# Patient Record
Sex: FEMALE | Race: WHITE | NOT HISPANIC OR LATINO | Employment: OTHER | URBAN - METROPOLITAN AREA
[De-identification: names, ages, dates, MRNs, and addresses within clinical notes are randomized per-mention and may not be internally consistent; named-entity substitution may affect disease eponyms.]

---

## 2017-03-22 DIAGNOSIS — I10 ESSENTIAL HYPERTENSION: Primary | ICD-10-CM

## 2017-03-22 DIAGNOSIS — R73.9 BLOOD GLUCOSE ELEVATED: ICD-10-CM

## 2017-03-22 DIAGNOSIS — E78.5 HYPERLIPIDEMIA, UNSPECIFIED HYPERLIPIDEMIA TYPE: ICD-10-CM

## 2017-03-25 LAB
ALBUMIN SERPL-MCNC: 4.1 G/DL (ref 3.5–5.2)
ALBUMIN/GLOB SERPL: 1.6 G/DL
ALP SERPL-CCNC: 89 U/L (ref 39–117)
ALT SERPL-CCNC: 10 U/L (ref 1–33)
AST SERPL-CCNC: 13 U/L (ref 1–32)
BILIRUB SERPL-MCNC: 0.4 MG/DL (ref 0.1–1.2)
BUN SERPL-MCNC: 10 MG/DL (ref 8–23)
BUN/CREAT SERPL: 13.3 (ref 7–25)
CALCIUM SERPL-MCNC: 10 MG/DL (ref 8.6–10.5)
CHLORIDE SERPL-SCNC: 99 MMOL/L (ref 98–107)
CHOLEST SERPL-MCNC: 180 MG/DL (ref 0–200)
CO2 SERPL-SCNC: 25.9 MMOL/L (ref 22–29)
CREAT SERPL-MCNC: 0.75 MG/DL (ref 0.57–1)
GLOBULIN SER CALC-MCNC: 2.6 GM/DL
GLUCOSE SERPL-MCNC: 71 MG/DL (ref 65–99)
HBA1C MFR BLD: 5.7 % (ref 4.8–5.6)
HDLC SERPL-MCNC: 47 MG/DL (ref 40–60)
LDLC SERPL CALC-MCNC: 102 MG/DL (ref 0–100)
LDLC/HDLC SERPL: 2.17 {RATIO}
POTASSIUM SERPL-SCNC: 4.3 MMOL/L (ref 3.5–5.2)
PROT SERPL-MCNC: 6.7 G/DL (ref 6–8.5)
SODIUM SERPL-SCNC: 141 MMOL/L (ref 136–145)
TRIGL SERPL-MCNC: 155 MG/DL (ref 0–150)
VLDLC SERPL CALC-MCNC: 31 MG/DL (ref 5–40)

## 2017-04-03 ENCOUNTER — OFFICE VISIT (OUTPATIENT)
Dept: FAMILY MEDICINE CLINIC | Facility: CLINIC | Age: 82
End: 2017-04-03

## 2017-04-03 VITALS
OXYGEN SATURATION: 98 % | HEART RATE: 86 BPM | WEIGHT: 153 LBS | BODY MASS INDEX: 24.01 KG/M2 | SYSTOLIC BLOOD PRESSURE: 110 MMHG | TEMPERATURE: 98.4 F | HEIGHT: 67 IN | DIASTOLIC BLOOD PRESSURE: 78 MMHG

## 2017-04-03 DIAGNOSIS — I10 ESSENTIAL HYPERTENSION: ICD-10-CM

## 2017-04-03 DIAGNOSIS — G30.1 LATE ONSET ALZHEIMER'S DISEASE WITHOUT BEHAVIORAL DISTURBANCE (HCC): Primary | ICD-10-CM

## 2017-04-03 DIAGNOSIS — E78.5 HYPERLIPIDEMIA, UNSPECIFIED HYPERLIPIDEMIA TYPE: ICD-10-CM

## 2017-04-03 DIAGNOSIS — F02.80 LATE ONSET ALZHEIMER'S DISEASE WITHOUT BEHAVIORAL DISTURBANCE (HCC): Primary | ICD-10-CM

## 2017-04-03 PROBLEM — G30.9 ALZHEIMER'S DISEASE (HCC): Status: ACTIVE | Noted: 2017-04-03

## 2017-04-03 PROCEDURE — 99214 OFFICE O/P EST MOD 30 MIN: CPT | Performed by: INTERNAL MEDICINE

## 2017-04-03 RX ORDER — DONEPEZIL HYDROCHLORIDE 5 MG/1
5 TABLET, FILM COATED ORAL NIGHTLY
Qty: 30 TABLET | Refills: 5 | Status: SHIPPED | OUTPATIENT
Start: 2017-04-03 | End: 2017-06-02 | Stop reason: SDUPTHER

## 2017-04-03 NOTE — PROGRESS NOTES
Subjective   Mary Alice Sanders is a 82 y.o. female. Patient is here today for   Chief Complaint   Patient presents with   • Hyperlipidemia     HYPERGLYCEMIA- FOLLOW UP LABS          Vitals:    04/03/17 1253   BP: 110/78   Pulse: 86   Temp: 98.4 °F (36.9 °C)   SpO2: 98%       Past Medical History:   Diagnosis Date   • Allergic rhinitis    • Chest pain    • GERD (gastroesophageal reflux disease)    • Hx of migraine headaches    • Hx: UTI (urinary tract infection)    • Hypertension    • Varicose veins       Allergies   Allergen Reactions   • No Known Drug Allergy       Social History     Social History   • Marital status:      Spouse name: N/A   • Number of children: N/A   • Years of education: N/A     Occupational History   • Not on file.     Social History Main Topics   • Smoking status: Never Smoker   • Smokeless tobacco: Not on file   • Alcohol use No   • Drug use: Defer   • Sexual activity: Defer     Other Topics Concern   • Not on file     Social History Narrative   • No narrative on file        Current Outpatient Prescriptions:   •  amitriptyline (ELAVIL) 25 MG tablet, Take 1 tablet by mouth every night., Disp: 90 tablet, Rfl: 3  •  atorvastatin (LIPITOR) 40 MG tablet, Take 1 tablet by mouth daily., Disp: 90 tablet, Rfl: 3  •  B Complex Vitamins (VITAMIN B COMPLEX) tablet, Take  by mouth daily., Disp: , Rfl:   •  calcium carbonate-vitamin d (CALCIUM 600+D) 600-400 MG-UNIT per tablet, Take  by mouth., Disp: , Rfl:   •  Cholecalciferol (VITAMIN D) 1000 UNITS tablet, Take  by mouth., Disp: , Rfl:   •  DULoxetine (CYMBALTA) 60 MG capsule, Take 1 capsule by mouth daily., Disp: 90 capsule, Rfl: 3  •  esomeprazole (NEXIUM) 40 MG capsule, Take 1 capsule by mouth every morning before breakfast., Disp: 90 capsule, Rfl: 3  •  montelukast (SINGULAIR) 10 MG tablet, Take 1 tablet by mouth every night., Disp: 90 tablet, Rfl: 3  •  propranolol (INDERAL) 20 MG tablet, Take 1 tablet by mouth 2 (two) times a day., Disp: 180  tablet, Rfl: 3  •  donepezil (ARICEPT) 5 MG tablet, Take 1 tablet by mouth Every Night., Disp: 30 tablet, Rfl: 5     Objective     HPI Comments: This pleasant demented lady is here today with her son.  He has no complaints.  He has no complaints except that her memory is getting much worse.  She needs assistance with all activities of daily living.    Hyperlipidemia          Review of Systems   Constitutional: Negative.    HENT: Negative.    Respiratory: Negative.    Cardiovascular: Negative.    Musculoskeletal: Negative.    Neurological: Negative.    Psychiatric/Behavioral: Negative.  Negative for agitation and behavioral problems.       Physical Exam   Constitutional: She is oriented to person, place, and time. She appears well-developed and well-nourished.   Very pleasant demeanor, very demented.   HENT:   Head: Normocephalic and atraumatic.   Eyes: EOM are normal. Pupils are equal, round, and reactive to light.   Pulmonary/Chest: Effort normal.   Neurological: She is alert and oriented to person, place, and time.   Psychiatric: She has a normal mood and affect.   She states that she has a very difficult time putting a intelligible sentence together.  She is smiling.  I believe she is comfortable.         Problem List Items Addressed This Visit        Cardiovascular and Mediastinum    HLD (hyperlipidemia)    BP (high blood pressure)       Nervous and Auditory    Alzheimer's disease - Primary    Relevant Medications    donepezil (ARICEPT) 5 MG tablet            PLAN  Her hypercholesterolemia is well-controlled on atorvastatin 40 mg once daily.    Her hypertension is well-controlled.    Her memory has gotten much worse.  I started her on Aricept 5 mg once daily.  Edema of the month, I would like the patient's caretaker to increase this dose to 2 tablets (10 mg) daily.    I would like to have her back in about 2 months to see how she is getting along.  No Follow-up on file.

## 2017-06-02 ENCOUNTER — OFFICE VISIT (OUTPATIENT)
Dept: FAMILY MEDICINE CLINIC | Facility: CLINIC | Age: 82
End: 2017-06-02

## 2017-06-02 VITALS
RESPIRATION RATE: 18 BRPM | DIASTOLIC BLOOD PRESSURE: 70 MMHG | SYSTOLIC BLOOD PRESSURE: 120 MMHG | BODY MASS INDEX: 24.55 KG/M2 | WEIGHT: 156.4 LBS | HEIGHT: 67 IN | HEART RATE: 70 BPM

## 2017-06-02 DIAGNOSIS — G30.1 LATE ONSET ALZHEIMER'S DISEASE WITHOUT BEHAVIORAL DISTURBANCE (HCC): ICD-10-CM

## 2017-06-02 DIAGNOSIS — F02.80 LATE ONSET ALZHEIMER'S DISEASE WITHOUT BEHAVIORAL DISTURBANCE (HCC): ICD-10-CM

## 2017-06-02 DIAGNOSIS — I10 ESSENTIAL HYPERTENSION: Primary | ICD-10-CM

## 2017-06-02 PROCEDURE — 99213 OFFICE O/P EST LOW 20 MIN: CPT | Performed by: INTERNAL MEDICINE

## 2017-06-02 RX ORDER — DONEPEZIL HYDROCHLORIDE 5 MG/1
5 TABLET, FILM COATED ORAL NIGHTLY
Qty: 90 TABLET | Refills: 3 | Status: SHIPPED | OUTPATIENT
Start: 2017-06-02 | End: 2018-06-07 | Stop reason: SDUPTHER

## 2017-06-02 RX ORDER — MEMANTINE HYDROCHLORIDE 5 MG/1
5 TABLET ORAL 2 TIMES DAILY
Qty: 180 TABLET | Refills: 3 | Status: SHIPPED | OUTPATIENT
Start: 2017-06-02 | End: 2018-06-07 | Stop reason: SDUPTHER

## 2017-06-06 NOTE — PROGRESS NOTES
Subjective   Mary Alice Sanders is a 82 y.o. female. Patient is here today for   Chief Complaint   Patient presents with   • Medication Follow Up          Vitals:    06/02/17 1308   BP: 120/70   Pulse: 70   Resp: 18       Past Medical History:   Diagnosis Date   • Allergic rhinitis    • Chest pain    • GERD (gastroesophageal reflux disease)    • Hx of migraine headaches    • Hx: UTI (urinary tract infection)    • Hypertension    • Varicose veins       Allergies   Allergen Reactions   • No Known Drug Allergy       Social History     Social History   • Marital status:      Spouse name: N/A   • Number of children: N/A   • Years of education: N/A     Occupational History   • Not on file.     Social History Main Topics   • Smoking status: Never Smoker   • Smokeless tobacco: Not on file   • Alcohol use No   • Drug use: Defer   • Sexual activity: Defer     Other Topics Concern   • Not on file     Social History Narrative        Current Outpatient Prescriptions:   •  amitriptyline (ELAVIL) 25 MG tablet, Take 1 tablet by mouth every night., Disp: 90 tablet, Rfl: 3  •  atorvastatin (LIPITOR) 40 MG tablet, Take 1 tablet by mouth daily., Disp: 90 tablet, Rfl: 3  •  B Complex Vitamins (VITAMIN B COMPLEX) tablet, Take  by mouth daily., Disp: , Rfl:   •  calcium carbonate-vitamin d (CALCIUM 600+D) 600-400 MG-UNIT per tablet, Take  by mouth., Disp: , Rfl:   •  Cholecalciferol (VITAMIN D) 1000 UNITS tablet, Take  by mouth., Disp: , Rfl:   •  donepezil (ARICEPT) 5 MG tablet, Take 1 tablet by mouth Every Night., Disp: 90 tablet, Rfl: 3  •  DULoxetine (CYMBALTA) 60 MG capsule, Take 1 capsule by mouth daily., Disp: 90 capsule, Rfl: 3  •  esomeprazole (NEXIUM) 40 MG capsule, Take 1 capsule by mouth every morning before breakfast., Disp: 90 capsule, Rfl: 3  •  montelukast (SINGULAIR) 10 MG tablet, Take 1 tablet by mouth every night., Disp: 90 tablet, Rfl: 3  •  propranolol (INDERAL) 20 MG tablet, Take 1 tablet by mouth 2 (two) times a  day., Disp: 180 tablet, Rfl: 3  •  memantine (NAMENDA) 5 MG tablet, Take 1 tablet by mouth 2 (Two) Times a Day., Disp: 180 tablet, Rfl: 3     Objective     HPI Comments: She really doesn't have any complaints today.  She is here to follow-up with hypertension and a very mild dementia.       Review of Systems   Constitutional: Negative.    HENT: Negative.    Respiratory: Negative.    Cardiovascular: Negative.    Musculoskeletal: Negative.    Psychiatric/Behavioral: Negative.        Physical Exam   Constitutional: She is oriented to person, place, and time. She appears well-developed and well-nourished.   HENT:   Head: Normocephalic and atraumatic.   Pulmonary/Chest: Effort normal.   Neurological: She is alert and oriented to person, place, and time.   Psychiatric: She has a normal mood and affect. Her behavior is normal.   Nursing note and vitals reviewed.        Problem List Items Addressed This Visit        Cardiovascular and Mediastinum    BP (high blood pressure) - Primary       Nervous and Auditory    Alzheimer's disease    Relevant Medications    memantine (NAMENDA) 5 MG tablet    donepezil (ARICEPT) 5 MG tablet            PLAN  Her hypertension appears to be well-controlled.    She is tolerating Aricept.  Add Namenda 5 mg 1 by mouth twice a day.    I like to have her back in a couple months to see how she is getting along.      No Follow-up on file.

## 2017-07-19 ENCOUNTER — APPOINTMENT (OUTPATIENT)
Dept: GENERAL RADIOLOGY | Facility: HOSPITAL | Age: 82
End: 2017-07-19

## 2017-07-19 PROCEDURE — 73630 X-RAY EXAM OF FOOT: CPT | Performed by: FAMILY MEDICINE

## 2017-07-19 PROCEDURE — 73610 X-RAY EXAM OF ANKLE: CPT | Performed by: FAMILY MEDICINE

## 2017-07-20 ENCOUNTER — TELEPHONE (OUTPATIENT)
Dept: FAMILY MEDICINE CLINIC | Facility: CLINIC | Age: 82
End: 2017-07-20

## 2017-07-20 RX ORDER — PROPRANOLOL HYDROCHLORIDE 20 MG/1
20 TABLET ORAL 2 TIMES DAILY
Qty: 30 TABLET | Refills: 1 | Status: SHIPPED | OUTPATIENT
Start: 2017-07-20 | End: 2017-07-20 | Stop reason: SDUPTHER

## 2017-07-20 RX ORDER — PROPRANOLOL HYDROCHLORIDE 20 MG/1
20 TABLET ORAL 2 TIMES DAILY
Qty: 30 TABLET | Refills: 1 | Status: SHIPPED | OUTPATIENT
Start: 2017-07-20 | End: 2017-08-29 | Stop reason: SDUPTHER

## 2017-07-20 NOTE — TELEPHONE ENCOUNTER
Prescription sent to pharmacy   ----- Message from Charlee Diaz sent at 7/20/2017  1:31 PM EDT -----  Contact: pt  PT USUALLY GETS AN RX FOR PROPANOLOL 20 MG XCL BID FROM [facility] BUT HE HAS RECEIVED A NOTICE FROM THEM THEY IT IS NOT AVAILABLE THRU THEM AT THIS TIME AND THAT HE NEEDED TO GET A 30 DAY SUPPLY FROM A LOCAL PHARMACY    SHANTE LIRA

## 2017-08-29 RX ORDER — PROPRANOLOL HYDROCHLORIDE 20 MG/1
TABLET ORAL
Qty: 30 TABLET | Refills: 0 | Status: SHIPPED | OUTPATIENT
Start: 2017-08-29 | End: 2017-09-14 | Stop reason: SDUPTHER

## 2017-09-12 DIAGNOSIS — R73.9 BLOOD GLUCOSE ELEVATED: ICD-10-CM

## 2017-09-12 DIAGNOSIS — I10 BENIGN ESSENTIAL HYPERTENSION: Primary | ICD-10-CM

## 2017-09-12 DIAGNOSIS — E78.5 HYPERLIPIDEMIA, UNSPECIFIED HYPERLIPIDEMIA TYPE: ICD-10-CM

## 2017-09-14 RX ORDER — PROPRANOLOL HYDROCHLORIDE 20 MG/1
TABLET ORAL
Qty: 30 TABLET | Refills: 0 | Status: SHIPPED | OUTPATIENT
Start: 2017-09-14 | End: 2017-09-22 | Stop reason: SDUPTHER

## 2017-09-15 LAB
ALBUMIN SERPL-MCNC: 3.9 G/DL (ref 3.5–5.2)
ALBUMIN/GLOB SERPL: 1.6 G/DL
ALP SERPL-CCNC: 75 U/L (ref 39–117)
ALT SERPL-CCNC: 10 U/L (ref 1–33)
AST SERPL-CCNC: 10 U/L (ref 1–32)
BASOPHILS # BLD AUTO: 0.02 10*3/MM3 (ref 0–0.2)
BASOPHILS NFR BLD AUTO: 0.3 % (ref 0–1.5)
BILIRUB SERPL-MCNC: 0.4 MG/DL (ref 0.1–1.2)
BUN SERPL-MCNC: 12 MG/DL (ref 8–23)
BUN/CREAT SERPL: 16 (ref 7–25)
CALCIUM SERPL-MCNC: 9.7 MG/DL (ref 8.6–10.5)
CHLORIDE SERPL-SCNC: 99 MMOL/L (ref 98–107)
CHOLEST SERPL-MCNC: 201 MG/DL (ref 0–200)
CO2 SERPL-SCNC: 24.7 MMOL/L (ref 22–29)
CREAT SERPL-MCNC: 0.75 MG/DL (ref 0.57–1)
EOSINOPHIL # BLD AUTO: 0.18 10*3/MM3 (ref 0–0.7)
EOSINOPHIL NFR BLD AUTO: 2.8 % (ref 0.3–6.2)
ERYTHROCYTE [DISTWIDTH] IN BLOOD BY AUTOMATED COUNT: 15.8 % (ref 11.7–13)
GLOBULIN SER CALC-MCNC: 2.4 GM/DL
GLUCOSE SERPL-MCNC: 93 MG/DL (ref 65–99)
HBA1C MFR BLD: 5.63 % (ref 4.8–5.6)
HCT VFR BLD AUTO: 41.7 % (ref 35.6–45.5)
HDLC SERPL-MCNC: 51 MG/DL (ref 40–60)
HGB BLD-MCNC: 13 G/DL (ref 11.9–15.5)
IMM GRANULOCYTES # BLD: 0.02 10*3/MM3 (ref 0–0.03)
IMM GRANULOCYTES NFR BLD: 0.3 % (ref 0–0.5)
LDLC SERPL CALC-MCNC: 113 MG/DL (ref 0–100)
LDLC/HDLC SERPL: 2.22 {RATIO}
LYMPHOCYTES # BLD AUTO: 2.82 10*3/MM3 (ref 0.9–4.8)
LYMPHOCYTES NFR BLD AUTO: 43.2 % (ref 19.6–45.3)
MCH RBC QN AUTO: 30.4 PG (ref 26.9–32)
MCHC RBC AUTO-ENTMCNC: 31.2 G/DL (ref 32.4–36.3)
MCV RBC AUTO: 97.7 FL (ref 80.5–98.2)
MONOCYTES # BLD AUTO: 0.44 10*3/MM3 (ref 0.2–1.2)
MONOCYTES NFR BLD AUTO: 6.7 % (ref 5–12)
NEUTROPHILS # BLD AUTO: 3.05 10*3/MM3 (ref 1.9–8.1)
NEUTROPHILS NFR BLD AUTO: 46.7 % (ref 42.7–76)
PLATELET # BLD AUTO: 271 10*3/MM3 (ref 140–500)
POTASSIUM SERPL-SCNC: 4.1 MMOL/L (ref 3.5–5.2)
PROT SERPL-MCNC: 6.3 G/DL (ref 6–8.5)
RBC # BLD AUTO: 4.27 10*6/MM3 (ref 3.9–5.2)
SODIUM SERPL-SCNC: 140 MMOL/L (ref 136–145)
TRIGL SERPL-MCNC: 184 MG/DL (ref 0–150)
VLDLC SERPL CALC-MCNC: 36.8 MG/DL (ref 5–40)
WBC # BLD AUTO: 6.53 10*3/MM3 (ref 4.5–10.7)

## 2017-09-22 ENCOUNTER — OFFICE VISIT (OUTPATIENT)
Dept: FAMILY MEDICINE CLINIC | Facility: CLINIC | Age: 82
End: 2017-09-22

## 2017-09-22 VITALS
HEART RATE: 65 BPM | SYSTOLIC BLOOD PRESSURE: 122 MMHG | DIASTOLIC BLOOD PRESSURE: 72 MMHG | BODY MASS INDEX: 24.17 KG/M2 | TEMPERATURE: 98 F | WEIGHT: 154 LBS | OXYGEN SATURATION: 98 % | HEIGHT: 67 IN | RESPIRATION RATE: 16 BRPM

## 2017-09-22 DIAGNOSIS — I10 ESSENTIAL HYPERTENSION: ICD-10-CM

## 2017-09-22 DIAGNOSIS — E78.5 HYPERLIPIDEMIA, UNSPECIFIED HYPERLIPIDEMIA TYPE: ICD-10-CM

## 2017-09-22 DIAGNOSIS — F02.80 LATE ONSET ALZHEIMER'S DISEASE WITHOUT BEHAVIORAL DISTURBANCE (HCC): Primary | ICD-10-CM

## 2017-09-22 DIAGNOSIS — G30.1 LATE ONSET ALZHEIMER'S DISEASE WITHOUT BEHAVIORAL DISTURBANCE (HCC): Primary | ICD-10-CM

## 2017-09-22 PROCEDURE — 99213 OFFICE O/P EST LOW 20 MIN: CPT | Performed by: INTERNAL MEDICINE

## 2017-09-22 RX ORDER — ATORVASTATIN CALCIUM 40 MG/1
40 TABLET, FILM COATED ORAL DAILY
Qty: 90 TABLET | Refills: 3 | Status: CANCELLED | OUTPATIENT
Start: 2017-09-22

## 2017-09-22 RX ORDER — PROPRANOLOL HYDROCHLORIDE 20 MG/1
20 TABLET ORAL 2 TIMES DAILY
Qty: 180 TABLET | Refills: 3 | Status: SHIPPED | OUTPATIENT
Start: 2017-09-22 | End: 2018-10-15 | Stop reason: SDUPTHER

## 2017-09-22 RX ORDER — MONTELUKAST SODIUM 10 MG/1
10 TABLET ORAL NIGHTLY
Qty: 90 TABLET | Refills: 3 | Status: SHIPPED | OUTPATIENT
Start: 2017-09-22 | End: 2018-09-21

## 2017-09-22 RX ORDER — ESOMEPRAZOLE MAGNESIUM 40 MG/1
40 CAPSULE, DELAYED RELEASE ORAL
Qty: 90 CAPSULE | Refills: 3 | Status: SHIPPED | OUTPATIENT
Start: 2017-09-22 | End: 2018-10-15 | Stop reason: SDUPTHER

## 2017-09-22 RX ORDER — ATORVASTATIN CALCIUM 40 MG/1
40 TABLET, FILM COATED ORAL DAILY
Qty: 90 TABLET | Refills: 3 | Status: SHIPPED | OUTPATIENT
Start: 2017-09-22 | End: 2018-10-15 | Stop reason: SDUPTHER

## 2017-09-22 RX ORDER — DULOXETIN HYDROCHLORIDE 60 MG/1
60 CAPSULE, DELAYED RELEASE ORAL DAILY
Qty: 90 CAPSULE | Refills: 3 | Status: SHIPPED | OUTPATIENT
Start: 2017-09-22 | End: 2017-09-22 | Stop reason: SDUPTHER

## 2017-09-22 RX ORDER — ESOMEPRAZOLE MAGNESIUM 40 MG/1
40 CAPSULE, DELAYED RELEASE ORAL
Qty: 90 CAPSULE | Refills: 3 | Status: SHIPPED | OUTPATIENT
Start: 2017-09-22 | End: 2017-09-22 | Stop reason: SDUPTHER

## 2017-09-22 RX ORDER — ATORVASTATIN CALCIUM 40 MG/1
40 TABLET, FILM COATED ORAL DAILY
Qty: 90 TABLET | Refills: 3 | Status: SHIPPED | OUTPATIENT
Start: 2017-09-22 | End: 2017-09-22 | Stop reason: SDUPTHER

## 2017-09-22 RX ORDER — MONTELUKAST SODIUM 10 MG/1
10 TABLET ORAL NIGHTLY
Qty: 90 TABLET | Refills: 3 | Status: SHIPPED | OUTPATIENT
Start: 2017-09-22 | End: 2017-09-22 | Stop reason: SDUPTHER

## 2017-09-22 RX ORDER — DULOXETIN HYDROCHLORIDE 60 MG/1
60 CAPSULE, DELAYED RELEASE ORAL DAILY
Qty: 90 CAPSULE | Refills: 3 | Status: SHIPPED | OUTPATIENT
Start: 2017-09-22 | End: 2018-10-15 | Stop reason: SDUPTHER

## 2017-09-22 RX ORDER — AMITRIPTYLINE HYDROCHLORIDE 25 MG/1
25 TABLET, FILM COATED ORAL NIGHTLY
Qty: 90 TABLET | Refills: 3 | Status: SHIPPED | OUTPATIENT
Start: 2017-09-22 | End: 2018-10-15 | Stop reason: SDUPTHER

## 2017-09-22 RX ORDER — AMITRIPTYLINE HYDROCHLORIDE 25 MG/1
25 TABLET, FILM COATED ORAL NIGHTLY
Qty: 90 TABLET | Refills: 3 | Status: SHIPPED | OUTPATIENT
Start: 2017-09-22 | End: 2017-09-22 | Stop reason: SDUPTHER

## 2017-09-24 NOTE — PROGRESS NOTES
Subjective   Mary Alice Sanders is a 82 y.o. female. Patient is here today for   Chief Complaint   Patient presents with   • Hypertension     lab f/u    • Hyperlipidemia   • Alzheimer's Disease          Vitals:    09/22/17 1329   BP: 122/72   Pulse: 65   Resp: 16   Temp: 98 °F (36.7 °C)   SpO2: 98%       Past Medical History:   Diagnosis Date   • Allergic rhinitis    • Chest pain    • GERD (gastroesophageal reflux disease)    • Hx of migraine headaches    • Hx: UTI (urinary tract infection)    • Hypertension    • Varicose veins       Allergies   Allergen Reactions   • No Known Drug Allergy       Social History     Social History   • Marital status:      Spouse name: N/A   • Number of children: N/A   • Years of education: N/A     Occupational History   • Not on file.     Social History Main Topics   • Smoking status: Never Smoker   • Smokeless tobacco: Not on file   • Alcohol use No   • Drug use: Defer   • Sexual activity: Defer     Other Topics Concern   • Not on file     Social History Narrative        Current Outpatient Prescriptions:   •  acetaminophen-codeine (TYLENOL #3) 300-30 MG per tablet, Take 1 tablet by mouth Every 6 (Six) Hours As Needed for Moderate Pain ., Disp: 12 tablet, Rfl: 0  •  B Complex Vitamins (VITAMIN B COMPLEX) tablet, Take  by mouth daily., Disp: , Rfl:   •  calcium carbonate-vitamin d (CALCIUM 600+D) 600-400 MG-UNIT per tablet, Take  by mouth., Disp: , Rfl:   •  Cholecalciferol (VITAMIN D) 1000 UNITS tablet, Take  by mouth., Disp: , Rfl:   •  donepezil (ARICEPT) 5 MG tablet, Take 1 tablet by mouth Every Night., Disp: 90 tablet, Rfl: 3  •  memantine (NAMENDA) 5 MG tablet, Take 1 tablet by mouth 2 (Two) Times a Day., Disp: 180 tablet, Rfl: 3  •  propranolol (INDERAL) 20 MG tablet, Take 1 tablet by mouth 2 (Two) Times a Day., Disp: 180 tablet, Rfl: 3  •  amitriptyline (ELAVIL) 25 MG tablet, Take 1 tablet by mouth Every Night., Disp: 90 tablet, Rfl: 3  •  atorvastatin (LIPITOR) 40 MG tablet,  Take 1 tablet by mouth Daily., Disp: 90 tablet, Rfl: 3  •  DULoxetine (CYMBALTA) 60 MG capsule, Take 1 capsule by mouth Daily., Disp: 90 capsule, Rfl: 3  •  esomeprazole (NEXIUM) 40 MG capsule, Take 1 capsule by mouth Every Morning Before Breakfast., Disp: 90 capsule, Rfl: 3  •  montelukast (SINGULAIR) 10 MG tablet, Take 1 tablet by mouth Every Night. Swallow the tablet do not break, crush, or chew, Disp: 90 tablet, Rfl: 3     Objective     HPI Comments: She is here to follow-up her hypertension and hypercholesterolemia as well as her Alzheimer's dementia.    She is accompanied by daughter.    Hypertension     Hyperlipidemia     Alzheimer's Disease          Review of Systems   Constitutional: Negative.    HENT: Negative.    Respiratory: Negative.    Cardiovascular: Negative.    Psychiatric/Behavioral:        Her dementia is profound.  She needs assistance with most activities of daily living including toileting and dressing.       Physical Exam   Constitutional: She is oriented to person, place, and time. She appears well-developed and well-nourished.   HENT:   Head: Normocephalic and atraumatic.   Pulmonary/Chest: Effort normal.   Neurological: She is alert and oriented to person, place, and time.   Psychiatric: She has a normal mood and affect. Her behavior is normal.   Nursing note and vitals reviewed.        Problem List Items Addressed This Visit        Cardiovascular and Mediastinum    HLD (hyperlipidemia)    BP (high blood pressure)    Relevant Medications    propranolol (INDERAL) 20 MG tablet       Nervous and Auditory    Alzheimer's disease - Primary            PLAN  Her hypertension is well-controlled.    Her hypercholesterolemia is well-controlled.    Her Alzheimer's dementia is severe and stable.    I like to see her back in about 6 months unless her daughter feels she needs to be seen sooner.  Prior to that visit, she should have the following labs drawn: Lipid profile, comprehensive metabolic panel,  CBC, and urinalysis.  No Follow-up on file.

## 2017-12-05 DIAGNOSIS — I10 ESSENTIAL HYPERTENSION: Primary | ICD-10-CM

## 2017-12-05 DIAGNOSIS — E78.5 HYPERLIPIDEMIA, UNSPECIFIED HYPERLIPIDEMIA TYPE: ICD-10-CM

## 2018-01-06 LAB
ALBUMIN SERPL-MCNC: 4.3 G/DL (ref 3.5–5.2)
ALBUMIN/GLOB SERPL: 1.7 G/DL
ALP SERPL-CCNC: 95 U/L (ref 39–117)
ALT SERPL-CCNC: 8 U/L (ref 1–33)
APPEARANCE UR: (no result)
AST SERPL-CCNC: 13 U/L (ref 1–32)
BACTERIA #/AREA URNS HPF: ABNORMAL /HPF
BASOPHILS # BLD AUTO: 0.03 10*3/MM3 (ref 0–0.2)
BASOPHILS NFR BLD AUTO: 0.5 % (ref 0–1.5)
BILIRUB SERPL-MCNC: 0.4 MG/DL (ref 0.1–1.2)
BILIRUB UR QL STRIP: NEGATIVE
BUN SERPL-MCNC: 14 MG/DL (ref 8–23)
BUN/CREAT SERPL: 16.5 (ref 7–25)
CALCIUM SERPL-MCNC: 9.2 MG/DL (ref 8.6–10.5)
CASTS URNS MICRO: ABNORMAL
CHLORIDE SERPL-SCNC: 101 MMOL/L (ref 98–107)
CHOLEST SERPL-MCNC: 214 MG/DL (ref 0–200)
CO2 SERPL-SCNC: 28.2 MMOL/L (ref 22–29)
COLOR UR: YELLOW
CREAT SERPL-MCNC: 0.85 MG/DL (ref 0.57–1)
EOSINOPHIL # BLD AUTO: 0.21 10*3/MM3 (ref 0–0.7)
EOSINOPHIL NFR BLD AUTO: 3.5 % (ref 0.3–6.2)
EPI CELLS #/AREA URNS HPF: ABNORMAL /HPF
ERYTHROCYTE [DISTWIDTH] IN BLOOD BY AUTOMATED COUNT: 14.4 % (ref 11.7–13)
GLOBULIN SER CALC-MCNC: 2.6 GM/DL
GLUCOSE SERPL-MCNC: 105 MG/DL (ref 65–99)
GLUCOSE UR QL: NEGATIVE
HCT VFR BLD AUTO: 43.9 % (ref 35.6–45.5)
HDLC SERPL-MCNC: 62 MG/DL (ref 40–60)
HGB BLD-MCNC: 13.7 G/DL (ref 11.9–15.5)
HGB UR QL STRIP: NEGATIVE
IMM GRANULOCYTES # BLD: 0 10*3/MM3 (ref 0–0.03)
IMM GRANULOCYTES NFR BLD: 0 % (ref 0–0.5)
KETONES UR QL STRIP: NEGATIVE
LDLC SERPL CALC-MCNC: 127 MG/DL (ref 0–100)
LDLC/HDLC SERPL: 2.05 {RATIO}
LEUKOCYTE ESTERASE UR QL STRIP: (no result)
LYMPHOCYTES # BLD AUTO: 2.4 10*3/MM3 (ref 0.9–4.8)
LYMPHOCYTES NFR BLD AUTO: 39.5 % (ref 19.6–45.3)
MCH RBC QN AUTO: 30.9 PG (ref 26.9–32)
MCHC RBC AUTO-ENTMCNC: 31.2 G/DL (ref 32.4–36.3)
MCV RBC AUTO: 99.1 FL (ref 80.5–98.2)
MONOCYTES # BLD AUTO: 0.41 10*3/MM3 (ref 0.2–1.2)
MONOCYTES NFR BLD AUTO: 6.8 % (ref 5–12)
MUCOUS THREADS URNS QL MICRO: ABNORMAL /HPF
NEUTROPHILS # BLD AUTO: 3.02 10*3/MM3 (ref 1.9–8.1)
NEUTROPHILS NFR BLD AUTO: 49.7 % (ref 42.7–76)
NITRITE UR QL STRIP: POSITIVE
PH UR STRIP: 6.5 [PH] (ref 5–8)
PLATELET # BLD AUTO: 279 10*3/MM3 (ref 140–500)
POTASSIUM SERPL-SCNC: 4.5 MMOL/L (ref 3.5–5.2)
PROT SERPL-MCNC: 6.9 G/DL (ref 6–8.5)
PROT UR QL STRIP: NEGATIVE
RBC # BLD AUTO: 4.43 10*6/MM3 (ref 3.9–5.2)
RBC #/AREA URNS HPF: ABNORMAL /HPF
SODIUM SERPL-SCNC: 140 MMOL/L (ref 136–145)
SP GR UR: 1.01 (ref 1–1.03)
TRIGL SERPL-MCNC: 125 MG/DL (ref 0–150)
UROBILINOGEN UR STRIP-MCNC: (no result) MG/DL
VLDLC SERPL CALC-MCNC: 25 MG/DL (ref 5–40)
WBC # BLD AUTO: 6.07 10*3/MM3 (ref 4.5–10.7)
WBC #/AREA URNS HPF: ABNORMAL /HPF

## 2018-01-15 ENCOUNTER — OFFICE VISIT (OUTPATIENT)
Dept: FAMILY MEDICINE CLINIC | Facility: CLINIC | Age: 83
End: 2018-01-15

## 2018-01-15 VITALS
HEART RATE: 84 BPM | RESPIRATION RATE: 16 BRPM | BODY MASS INDEX: 25.11 KG/M2 | DIASTOLIC BLOOD PRESSURE: 82 MMHG | SYSTOLIC BLOOD PRESSURE: 120 MMHG | OXYGEN SATURATION: 97 % | WEIGHT: 160 LBS | HEIGHT: 67 IN

## 2018-01-15 DIAGNOSIS — N30.90 CYSTITIS: ICD-10-CM

## 2018-01-15 DIAGNOSIS — E78.5 HYPERLIPIDEMIA, UNSPECIFIED HYPERLIPIDEMIA TYPE: Primary | ICD-10-CM

## 2018-01-15 DIAGNOSIS — E55.9 VITAMIN D DEFICIENCY: ICD-10-CM

## 2018-01-15 DIAGNOSIS — I10 ESSENTIAL HYPERTENSION: ICD-10-CM

## 2018-01-15 DIAGNOSIS — E53.8 VITAMIN B 12 DEFICIENCY: ICD-10-CM

## 2018-01-15 PROCEDURE — 99213 OFFICE O/P EST LOW 20 MIN: CPT | Performed by: INTERNAL MEDICINE

## 2018-01-15 RX ORDER — NITROFURANTOIN 25; 75 MG/1; MG/1
100 CAPSULE ORAL EVERY 12 HOURS SCHEDULED
Qty: 14 CAPSULE | Refills: 1 | Status: SHIPPED | OUTPATIENT
Start: 2018-01-15 | End: 2018-02-26

## 2018-01-15 NOTE — PROGRESS NOTES
Subjective   Mary Alice Sanders is a 82 y.o. female. Patient is here today for   Chief Complaint   Patient presents with   • Alzheimer's Disease   • Hyperlipidemia          Vitals:    01/15/18 1407   BP: 120/82   Pulse: 84   Resp: 16   SpO2: 97%       Past Medical History:   Diagnosis Date   • Allergic rhinitis    • Chest pain    • GERD (gastroesophageal reflux disease)    • Hx of migraine headaches    • Hx: UTI (urinary tract infection)    • Hypertension    • Varicose veins       Allergies   Allergen Reactions   • No Known Drug Allergy       Social History     Social History   • Marital status:      Spouse name: N/A   • Number of children: N/A   • Years of education: N/A     Occupational History   • Not on file.     Social History Main Topics   • Smoking status: Never Smoker   • Smokeless tobacco: Not on file   • Alcohol use No   • Drug use: Defer   • Sexual activity: Defer     Other Topics Concern   • Not on file     Social History Narrative        Current Outpatient Prescriptions:   •  acetaminophen-codeine (TYLENOL #3) 300-30 MG per tablet, Take 1 tablet by mouth Every 6 (Six) Hours As Needed for Moderate Pain ., Disp: 12 tablet, Rfl: 0  •  amitriptyline (ELAVIL) 25 MG tablet, Take 1 tablet by mouth Every Night., Disp: 90 tablet, Rfl: 3  •  atorvastatin (LIPITOR) 40 MG tablet, Take 1 tablet by mouth Daily., Disp: 90 tablet, Rfl: 3  •  B Complex Vitamins (VITAMIN B COMPLEX) tablet, Take  by mouth daily., Disp: , Rfl:   •  calcium carbonate-vitamin d (CALCIUM 600+D) 600-400 MG-UNIT per tablet, Take  by mouth., Disp: , Rfl:   •  Cholecalciferol (VITAMIN D) 1000 UNITS tablet, Take  by mouth., Disp: , Rfl:   •  donepezil (ARICEPT) 5 MG tablet, Take 1 tablet by mouth Every Night., Disp: 90 tablet, Rfl: 3  •  DULoxetine (CYMBALTA) 60 MG capsule, Take 1 capsule by mouth Daily., Disp: 90 capsule, Rfl: 3  •  esomeprazole (NEXIUM) 40 MG capsule, Take 1 capsule by mouth Every Morning Before Breakfast., Disp: 90 capsule,  Rfl: 3  •  memantine (NAMENDA) 5 MG tablet, Take 1 tablet by mouth 2 (Two) Times a Day., Disp: 180 tablet, Rfl: 3  •  montelukast (SINGULAIR) 10 MG tablet, Take 1 tablet by mouth Every Night. Swallow the tablet do not break, crush, or chew, Disp: 90 tablet, Rfl: 3  •  nitrofurantoin, macrocrystal-monohydrate, (MACROBID) 100 MG capsule, Take 1 capsule by mouth Every 12 (Twelve) Hours., Disp: 14 capsule, Rfl: 1  •  propranolol (INDERAL) 20 MG tablet, Take 1 tablet by mouth 2 (Two) Times a Day., Disp: 180 tablet, Rfl: 3     Objective     HPI Comments: She is here to follow-up on some lab work done last week.    He's feeling a little weak today.  Her son had a pressure in a wheelchair.    She is very pleasantly confused.  She had no complaints.    Alzheimer's Disease     Hyperlipidemia          Review of Systems   Constitutional:        She is feeling a little weak over the last several days.   HENT: Negative.    Respiratory: Negative.    Cardiovascular: Negative.    Musculoskeletal: Negative.    Psychiatric/Behavioral:        She is demented.  The level of her dementia is quite severe and stable.       Physical Exam   Constitutional: She is oriented to person, place, and time. She appears well-developed and well-nourished.   HENT:   Head: Normocephalic and atraumatic.   Cardiovascular: Normal rate.    Pulmonary/Chest: Effort normal.   Neurological: She is alert and oriented to person, place, and time.   Psychiatric: She has a normal mood and affect.   Nursing note and vitals reviewed.        Problem List Items Addressed This Visit        Cardiovascular and Mediastinum    HLD (hyperlipidemia) - Primary    BP (high blood pressure)       Digestive    Vitamin B 12 deficiency    Relevant Orders    Vitamin B12    Vitamin D deficiency    Relevant Orders    Vitamin D 25 hydroxy       Genitourinary    Cystitis    Relevant Medications    nitrofurantoin, macrocrystal-monohydrate, (MACROBID) 100 MG capsule            PLAN  She and  her son and myself discussed her past medical history which is significant for a vitamin B12 and vitamin D deficiency.  I will check those today.    Evaluation of her urinalysis does show a urinary tract infection which may explain her weakness.  I wrote her prescription for nitrofurantoin 100 mg 1 by mouth twice a day.  I would like to have her back in a couple weeks to recheck a urinalysis.    Her hypercholesterolemia is with excellent control on atorvastatin 40 mg once daily.  No Follow-up on file.

## 2018-01-16 LAB — VIT B12 SERPL-MCNC: 241 PG/ML (ref 211–946)

## 2018-01-20 ENCOUNTER — RESULTS ENCOUNTER (OUTPATIENT)
Dept: FAMILY MEDICINE CLINIC | Facility: CLINIC | Age: 83
End: 2018-01-20

## 2018-01-20 DIAGNOSIS — E55.9 VITAMIN D DEFICIENCY: ICD-10-CM

## 2018-01-29 ENCOUNTER — OFFICE VISIT (OUTPATIENT)
Dept: FAMILY MEDICINE CLINIC | Facility: CLINIC | Age: 83
End: 2018-01-29

## 2018-01-29 VITALS
HEART RATE: 91 BPM | DIASTOLIC BLOOD PRESSURE: 80 MMHG | SYSTOLIC BLOOD PRESSURE: 110 MMHG | HEIGHT: 67 IN | BODY MASS INDEX: 23.54 KG/M2 | WEIGHT: 150 LBS | RESPIRATION RATE: 17 BRPM | OXYGEN SATURATION: 96 %

## 2018-01-29 DIAGNOSIS — J40 BRONCHITIS: Primary | ICD-10-CM

## 2018-01-29 DIAGNOSIS — E55.9 VITAMIN D DEFICIENCY DISEASE: ICD-10-CM

## 2018-01-29 PROCEDURE — 99213 OFFICE O/P EST LOW 20 MIN: CPT | Performed by: INTERNAL MEDICINE

## 2018-01-29 RX ORDER — AZITHROMYCIN 250 MG/1
TABLET, FILM COATED ORAL
Qty: 6 TABLET | Refills: 0 | Status: SHIPPED | OUTPATIENT
Start: 2018-01-29 | End: 2018-02-26

## 2018-01-30 PROBLEM — J40 BRONCHITIS: Status: ACTIVE | Noted: 2018-01-30

## 2018-01-30 PROBLEM — E55.9 VITAMIN D DEFICIENCY DISEASE: Status: ACTIVE | Noted: 2018-01-30

## 2018-01-30 LAB — 25(OH)D3+25(OH)D2 SERPL-MCNC: >100 NG/ML (ref 30–100)

## 2018-01-30 NOTE — PROGRESS NOTES
Subjective   Mary Alice Sanders is a 83 y.o. female. Patient is here today for   Chief Complaint   Patient presents with   • Urinary Tract Infection   • Cough          Vitals:    01/29/18 1429   BP: 110/80   Pulse: 91   Resp: 17   SpO2: 96%       Past Medical History:   Diagnosis Date   • Allergic rhinitis    • Chest pain    • GERD (gastroesophageal reflux disease)    • Hx of migraine headaches    • Hx: UTI (urinary tract infection)    • Hypertension    • Varicose veins       Allergies   Allergen Reactions   • No Known Drug Allergy       Social History     Social History   • Marital status:      Spouse name: N/A   • Number of children: N/A   • Years of education: N/A     Occupational History   • Not on file.     Social History Main Topics   • Smoking status: Never Smoker   • Smokeless tobacco: Not on file   • Alcohol use No   • Drug use: Defer   • Sexual activity: Defer     Other Topics Concern   • Not on file     Social History Narrative        Current Outpatient Prescriptions:   •  acetaminophen-codeine (TYLENOL #3) 300-30 MG per tablet, Take 1 tablet by mouth Every 6 (Six) Hours As Needed for Moderate Pain ., Disp: 12 tablet, Rfl: 0  •  amitriptyline (ELAVIL) 25 MG tablet, Take 1 tablet by mouth Every Night., Disp: 90 tablet, Rfl: 3  •  atorvastatin (LIPITOR) 40 MG tablet, Take 1 tablet by mouth Daily., Disp: 90 tablet, Rfl: 3  •  azithromycin (ZITHROMAX) 250 MG tablet, Take 2 tablets the first day, then 1 tablet daily for 4 days., Disp: 6 tablet, Rfl: 0  •  B Complex Vitamins (VITAMIN B COMPLEX) tablet, Take  by mouth daily., Disp: , Rfl:   •  calcium carbonate-vitamin d (CALCIUM 600+D) 600-400 MG-UNIT per tablet, Take  by mouth., Disp: , Rfl:   •  Cholecalciferol (VITAMIN D) 1000 UNITS tablet, Take  by mouth., Disp: , Rfl:   •  donepezil (ARICEPT) 5 MG tablet, Take 1 tablet by mouth Every Night., Disp: 90 tablet, Rfl: 3  •  DULoxetine (CYMBALTA) 60 MG capsule, Take 1 capsule by mouth Daily., Disp: 90 capsule,  Rfl: 3  •  esomeprazole (NEXIUM) 40 MG capsule, Take 1 capsule by mouth Every Morning Before Breakfast., Disp: 90 capsule, Rfl: 3  •  memantine (NAMENDA) 5 MG tablet, Take 1 tablet by mouth 2 (Two) Times a Day., Disp: 180 tablet, Rfl: 3  •  montelukast (SINGULAIR) 10 MG tablet, Take 1 tablet by mouth Every Night. Swallow the tablet do not break, crush, or chew, Disp: 90 tablet, Rfl: 3  •  nitrofurantoin, macrocrystal-monohydrate, (MACROBID) 100 MG capsule, Take 1 capsule by mouth Every 12 (Twelve) Hours., Disp: 14 capsule, Rfl: 1  •  propranolol (INDERAL) 20 MG tablet, Take 1 tablet by mouth 2 (Two) Times a Day., Disp: 180 tablet, Rfl: 3     Objective     HPI Comments: This pleasant elderly lady is here to follow-up on a urinary tract infection.    Her son notes that she's had a cough over the last couple days which appears to be productive.    Urinary Tract Infection      Cough   Pertinent negatives include no shortness of breath.        Review of Systems   Constitutional: Negative.    HENT: Negative.    Respiratory: Positive for cough. Negative for shortness of breath and stridor.    Cardiovascular: Negative.    Genitourinary: Negative.    Musculoskeletal: Negative.    Psychiatric/Behavioral: Negative.        Physical Exam   Constitutional: She is oriented to person, place, and time. She appears well-developed and well-nourished.   This pleasant 83-year-old lady is here to follow-up on her recent urinary tract infection.  She was able to give me some urine for a urinalysis today which shows that her UTI has resolved.    The meantime, she is developed cough over the last couple days which is productive.  She denies being short of breath.   HENT:   Head: Normocephalic and atraumatic.   Pulmonary/Chest: Effort normal. No respiratory distress. She has no wheezes. She has no rales.   She has coarse rhonchi bilaterally which clear with coughing.   Neurological: She is alert and oriented to person, place, and time.    Psychiatric: She has a normal mood and affect. Her behavior is normal.   Nursing note and vitals reviewed.        Problem List Items Addressed This Visit     None      Visit Diagnoses     Bronchitis    -  Primary    Relevant Orders    CBC & Differential    Vitamin B12    Vitamin D deficiency disease        Relevant Orders    Vitamin D 25 hydroxy (Completed)            PLAN  Her urinary tract.  Infection has resolved.    For her acute bronchitis, gave her prescription for Zithromax Z-Will.  She and her son did not believe that she needed a cough medication.    She is a history of vitamin D deficiency.  I meant to check a vitamin D level on her.  She is taking supplemental vitamin D 4000 units twice a day.  No Follow-up on file.

## 2018-02-26 ENCOUNTER — OFFICE VISIT (OUTPATIENT)
Dept: FAMILY MEDICINE CLINIC | Facility: CLINIC | Age: 83
End: 2018-02-26

## 2018-02-26 VITALS
HEIGHT: 67 IN | WEIGHT: 155 LBS | BODY MASS INDEX: 24.33 KG/M2 | SYSTOLIC BLOOD PRESSURE: 110 MMHG | RESPIRATION RATE: 16 BRPM | DIASTOLIC BLOOD PRESSURE: 80 MMHG | OXYGEN SATURATION: 94 % | TEMPERATURE: 97.6 F | HEART RATE: 82 BPM

## 2018-02-26 DIAGNOSIS — G30.1 LATE ONSET ALZHEIMER'S DISEASE WITHOUT BEHAVIORAL DISTURBANCE (HCC): Primary | ICD-10-CM

## 2018-02-26 DIAGNOSIS — F02.80 LATE ONSET ALZHEIMER'S DISEASE WITHOUT BEHAVIORAL DISTURBANCE (HCC): Primary | ICD-10-CM

## 2018-02-26 PROCEDURE — 99213 OFFICE O/P EST LOW 20 MIN: CPT | Performed by: INTERNAL MEDICINE

## 2018-02-26 NOTE — PROGRESS NOTES
Subjective   Mary Alice Sanders is a 83 y.o. female. Patient is here today for   Chief Complaint   Patient presents with   • Follow-up     bronchitis and vit d check           Vitals:    02/26/18 1402   BP: 110/80   Pulse: 82   Resp: 16   Temp: 97.6 °F (36.4 °C)   SpO2: 94%       Past Medical History:   Diagnosis Date   • Allergic rhinitis    • Chest pain    • GERD (gastroesophageal reflux disease)    • Hx of migraine headaches    • Hx: UTI (urinary tract infection)    • Hypertension    • Varicose veins       Allergies   Allergen Reactions   • No Known Drug Allergy       Social History     Social History   • Marital status:      Spouse name: N/A   • Number of children: N/A   • Years of education: N/A     Occupational History   • Not on file.     Social History Main Topics   • Smoking status: Never Smoker   • Smokeless tobacco: Never Used   • Alcohol use No   • Drug use: Defer   • Sexual activity: Defer     Other Topics Concern   • Not on file     Social History Narrative   • No narrative on file        Current Outpatient Prescriptions:   •  acetaminophen-codeine (TYLENOL #3) 300-30 MG per tablet, Take 1 tablet by mouth Every 6 (Six) Hours As Needed for Moderate Pain ., Disp: 12 tablet, Rfl: 0  •  amitriptyline (ELAVIL) 25 MG tablet, Take 1 tablet by mouth Every Night., Disp: 90 tablet, Rfl: 3  •  atorvastatin (LIPITOR) 40 MG tablet, Take 1 tablet by mouth Daily., Disp: 90 tablet, Rfl: 3  •  B Complex Vitamins (VITAMIN B COMPLEX) tablet, Take  by mouth daily., Disp: , Rfl:   •  calcium carbonate-vitamin d (CALCIUM 600+D) 600-400 MG-UNIT per tablet, Take  by mouth., Disp: , Rfl:   •  Cholecalciferol (VITAMIN D) 1000 UNITS tablet, Take  by mouth., Disp: , Rfl:   •  donepezil (ARICEPT) 5 MG tablet, Take 1 tablet by mouth Every Night., Disp: 90 tablet, Rfl: 3  •  DULoxetine (CYMBALTA) 60 MG capsule, Take 1 capsule by mouth Daily., Disp: 90 capsule, Rfl: 3  •  esomeprazole (NEXIUM) 40 MG capsule, Take 1 capsule by mouth  Every Morning Before Breakfast., Disp: 90 capsule, Rfl: 3  •  memantine (NAMENDA) 5 MG tablet, Take 1 tablet by mouth 2 (Two) Times a Day., Disp: 180 tablet, Rfl: 3  •  montelukast (SINGULAIR) 10 MG tablet, Take 1 tablet by mouth Every Night. Swallow the tablet do not break, crush, or chew, Disp: 90 tablet, Rfl: 3  •  propranolol (INDERAL) 20 MG tablet, Take 1 tablet by mouth 2 (Two) Times a Day., Disp: 180 tablet, Rfl: 3     Objective     HPI Comments: She is here to follow-up on her constipation.  She is been taking supplemental fiber and MiraLAX when necessary.  She feels her constipation is well-controlled.    She is demented and is pleasant as always.  She is accompanied by family member.       Review of Systems   Constitutional: Negative.    HENT: Negative.    Respiratory: Negative.    Cardiovascular: Negative.    Musculoskeletal: Negative.    Psychiatric/Behavioral: Negative.        Physical Exam   Constitutional: She is oriented to person, place, and time. She appears well-developed and well-nourished.   HENT:   Head: Normocephalic and atraumatic.   Pulmonary/Chest: Effort normal.   Neurological: She is alert and oriented to person, place, and time.   Psychiatric: She has a normal mood and affect. Her behavior is normal.   Nursing note and vitals reviewed.        Problem List Items Addressed This Visit        Nervous and Auditory    Alzheimer's disease - Primary            PLAN  She and her family member please tell he is doing on donepezil 5 mg and Namenda 5 mg tablets.    Her constipation appears to be well-controlled.    I asked her to follow-up in about 4 months.  She needs to be seen sooner, I asked her to come in.  No Follow-up on file.

## 2018-06-07 ENCOUNTER — OFFICE VISIT (OUTPATIENT)
Dept: FAMILY MEDICINE CLINIC | Facility: CLINIC | Age: 83
End: 2018-06-07

## 2018-06-07 VITALS
SYSTOLIC BLOOD PRESSURE: 118 MMHG | WEIGHT: 153.8 LBS | DIASTOLIC BLOOD PRESSURE: 78 MMHG | BODY MASS INDEX: 24.14 KG/M2 | TEMPERATURE: 99.3 F | OXYGEN SATURATION: 95 % | HEIGHT: 67 IN | HEART RATE: 88 BPM

## 2018-06-07 DIAGNOSIS — F02.80 LATE ONSET ALZHEIMER'S DISEASE WITHOUT BEHAVIORAL DISTURBANCE (HCC): ICD-10-CM

## 2018-06-07 DIAGNOSIS — I10 ESSENTIAL HYPERTENSION: Primary | ICD-10-CM

## 2018-06-07 DIAGNOSIS — G30.1 LATE ONSET ALZHEIMER'S DISEASE WITHOUT BEHAVIORAL DISTURBANCE (HCC): ICD-10-CM

## 2018-06-07 PROCEDURE — 99213 OFFICE O/P EST LOW 20 MIN: CPT | Performed by: INTERNAL MEDICINE

## 2018-06-07 RX ORDER — MEMANTINE HYDROCHLORIDE 10 MG/1
10 TABLET ORAL 2 TIMES DAILY
Qty: 180 TABLET | Refills: 3 | Status: SHIPPED | OUTPATIENT
Start: 2018-06-07 | End: 2019-06-20 | Stop reason: SDUPTHER

## 2018-06-07 RX ORDER — DONEPEZIL HYDROCHLORIDE 10 MG/1
10 TABLET, FILM COATED ORAL NIGHTLY
Qty: 90 TABLET | Refills: 3 | Status: SHIPPED | OUTPATIENT
Start: 2018-06-07 | End: 2018-09-21 | Stop reason: SDUPTHER

## 2018-06-07 RX ORDER — MEMANTINE HYDROCHLORIDE 5 MG/1
10 TABLET ORAL 2 TIMES DAILY
Qty: 180 TABLET | Refills: 3 | Status: SHIPPED | OUTPATIENT
Start: 2018-06-07 | End: 2018-06-07

## 2018-06-07 NOTE — PROGRESS NOTES
Subjective   Mary Alice Sanders is a 83 y.o. female. Patient is here today for   Chief Complaint   Patient presents with   • LATE ONSET ALZHEIMERS DISEASE      3 MONTH FOLLOW UP          Vitals:    06/07/18 1402   BP: 118/78   Pulse: 88   Temp: 99.3 °F (37.4 °C)   SpO2: 95%       Past Medical History:   Diagnosis Date   • Allergic rhinitis    • Chest pain    • GERD (gastroesophageal reflux disease)    • Hx of migraine headaches    • Hx: UTI (urinary tract infection)    • Hypertension    • Varicose veins       Allergies   Allergen Reactions   • No Known Drug Allergy       Social History     Social History   • Marital status:      Spouse name: N/A   • Number of children: N/A   • Years of education: N/A     Occupational History   • Not on file.     Social History Main Topics   • Smoking status: Never Smoker   • Smokeless tobacco: Never Used   • Alcohol use No   • Drug use: Unknown   • Sexual activity: Defer     Other Topics Concern   • Not on file     Social History Narrative   • No narrative on file        Current Outpatient Prescriptions:   •  acetaminophen-codeine (TYLENOL #3) 300-30 MG per tablet, Take 1 tablet by mouth Every 6 (Six) Hours As Needed for Moderate Pain ., Disp: 12 tablet, Rfl: 0  •  amitriptyline (ELAVIL) 25 MG tablet, Take 1 tablet by mouth Every Night., Disp: 90 tablet, Rfl: 3  •  atorvastatin (LIPITOR) 40 MG tablet, Take 1 tablet by mouth Daily., Disp: 90 tablet, Rfl: 3  •  B Complex Vitamins (VITAMIN B COMPLEX) tablet, Take  by mouth daily., Disp: , Rfl:   •  calcium carbonate-vitamin d (CALCIUM 600+D) 600-400 MG-UNIT per tablet, Take  by mouth., Disp: , Rfl:   •  Cholecalciferol (VITAMIN D) 1000 UNITS tablet, Take  by mouth., Disp: , Rfl:   •  donepezil (ARICEPT) 10 MG tablet, Take 1 tablet by mouth Every Night., Disp: 90 tablet, Rfl: 3  •  DULoxetine (CYMBALTA) 60 MG capsule, Take 1 capsule by mouth Daily., Disp: 90 capsule, Rfl: 3  •  esomeprazole (NEXIUM) 40 MG capsule, Take 1 capsule by  mouth Every Morning Before Breakfast., Disp: 90 capsule, Rfl: 3  •  memantine (NAMENDA) 10 MG tablet, Take 1 tablet by mouth 2 (Two) Times a Day., Disp: 180 tablet, Rfl: 3  •  montelukast (SINGULAIR) 10 MG tablet, Take 1 tablet by mouth Every Night. Swallow the tablet do not break, crush, or chew, Disp: 90 tablet, Rfl: 3  •  propranolol (INDERAL) 20 MG tablet, Take 1 tablet by mouth 2 (Two) Times a Day., Disp: 180 tablet, Rfl: 3     Objective     She is here today to follow-up on her dementia.  She tells me that she feels well.    Her son, who accompanies her today, tells me that she's been going to [facility] dentistry.  She has had all her teeth pulled over the last several months.  She is going to be set up to get dentures soon.  He tells me that this is been a painless process for her.         Review of Systems   Constitutional: Negative.    HENT: Negative.    Respiratory: Negative.    Cardiovascular: Negative.    Musculoskeletal: Negative.    Psychiatric/Behavioral: Negative.        Physical Exam   Constitutional: She is oriented to person, place, and time. She appears well-developed and well-nourished.   HENT:   Head: Normocephalic and atraumatic.   Pulmonary/Chest: Effort normal.   Neurological: She is alert and oriented to person, place, and time.   Psychiatric: She has a normal mood and affect. Her behavior is normal.   Nursing note and vitals reviewed.        Problem List Items Addressed This Visit        Cardiovascular and Mediastinum    BP (high blood pressure) - Primary       Nervous and Auditory    Alzheimer's disease    Relevant Medications    donepezil (ARICEPT) 10 MG tablet    memantine (NAMENDA) 10 MG tablet            PLAN  Her hypertension is well-controlled.    She is tolerated donepezil and Namenda.  I'm going to increase those to donepezil 10 mg daily and Namenda 10 mg twice a day.    I like to see her back in September.  I like her to have the following labs about a week before:  Comprehensive metabolic panel, urinalysis, vitamin D level, vitamin B12 level, CBC and lipid profile.  No Follow-up on file.

## 2018-09-11 DIAGNOSIS — E55.9 VITAMIN D DEFICIENCY: ICD-10-CM

## 2018-09-11 DIAGNOSIS — E78.5 HYPERLIPIDEMIA, UNSPECIFIED HYPERLIPIDEMIA TYPE: Primary | ICD-10-CM

## 2018-09-11 DIAGNOSIS — R73.9 BLOOD GLUCOSE ELEVATED: ICD-10-CM

## 2018-09-11 DIAGNOSIS — E78.5 HYPERLIPIDEMIA, UNSPECIFIED HYPERLIPIDEMIA TYPE: ICD-10-CM

## 2018-09-11 DIAGNOSIS — E53.8 VITAMIN B 12 DEFICIENCY: ICD-10-CM

## 2018-09-17 LAB
25(OH)D3+25(OH)D2 SERPL-MCNC: >120 NG/ML (ref 30–100)
ALBUMIN SERPL-MCNC: 4.2 G/DL (ref 3.5–5.2)
ALBUMIN/GLOB SERPL: 2 G/DL
ALP SERPL-CCNC: 75 U/L (ref 39–117)
ALT SERPL-CCNC: 6 U/L (ref 1–33)
APPEARANCE UR: (no result)
AST SERPL-CCNC: 10 U/L (ref 1–32)
BACTERIA #/AREA URNS HPF: ABNORMAL /HPF
BASOPHILS # BLD AUTO: 0.03 10*3/MM3 (ref 0–0.2)
BASOPHILS NFR BLD AUTO: 0.5 % (ref 0–1.5)
BILIRUB SERPL-MCNC: 0.4 MG/DL (ref 0.1–1.2)
BILIRUB UR QL STRIP: NEGATIVE
BUN SERPL-MCNC: 4 MG/DL (ref 8–23)
BUN/CREAT SERPL: 7.3 (ref 7–25)
CALCIUM SERPL-MCNC: 9.3 MG/DL (ref 8.6–10.5)
CASTS URNS MICRO: ABNORMAL
CHLORIDE SERPL-SCNC: 98 MMOL/L (ref 98–107)
CHOLEST SERPL-MCNC: 187 MG/DL (ref 0–200)
CO2 SERPL-SCNC: 28.5 MMOL/L (ref 22–29)
COLOR UR: YELLOW
CREAT SERPL-MCNC: 0.55 MG/DL (ref 0.57–1)
EOSINOPHIL # BLD AUTO: 0.16 10*3/MM3 (ref 0–0.7)
EOSINOPHIL NFR BLD AUTO: 2.9 % (ref 0.3–6.2)
EPI CELLS #/AREA URNS HPF: ABNORMAL /HPF
ERYTHROCYTE [DISTWIDTH] IN BLOOD BY AUTOMATED COUNT: 16 % (ref 11.7–13)
GLOBULIN SER CALC-MCNC: 2.1 GM/DL
GLUCOSE SERPL-MCNC: 97 MG/DL (ref 65–99)
GLUCOSE UR QL: NEGATIVE
HBA1C MFR BLD: 5.7 % (ref 4.8–5.6)
HCT VFR BLD AUTO: 43.7 % (ref 35.6–45.5)
HDLC SERPL-MCNC: 54 MG/DL (ref 40–60)
HGB BLD-MCNC: 13.4 G/DL (ref 11.9–15.5)
HGB UR QL STRIP: NEGATIVE
IMM GRANULOCYTES # BLD: 0 10*3/MM3 (ref 0–0.03)
IMM GRANULOCYTES NFR BLD: 0 % (ref 0–0.5)
KETONES UR QL STRIP: NEGATIVE
LDLC SERPL CALC-MCNC: 97 MG/DL (ref 0–100)
LDLC/HDLC SERPL: 1.8 {RATIO}
LEUKOCYTE ESTERASE UR QL STRIP: (no result)
LYMPHOCYTES # BLD AUTO: 2.15 10*3/MM3 (ref 0.9–4.8)
LYMPHOCYTES NFR BLD AUTO: 38.8 % (ref 19.6–45.3)
MCH RBC QN AUTO: 28.9 PG (ref 26.9–32)
MCHC RBC AUTO-ENTMCNC: 30.7 G/DL (ref 32.4–36.3)
MCV RBC AUTO: 94.4 FL (ref 80.5–98.2)
MONOCYTES # BLD AUTO: 0.39 10*3/MM3 (ref 0.2–1.2)
MONOCYTES NFR BLD AUTO: 7 % (ref 5–12)
NEUTROPHILS # BLD AUTO: 2.81 10*3/MM3 (ref 1.9–8.1)
NEUTROPHILS NFR BLD AUTO: 50.8 % (ref 42.7–76)
NITRITE UR QL STRIP: NEGATIVE
PH UR STRIP: 6.5 [PH] (ref 5–8)
PLATELET # BLD AUTO: 305 10*3/MM3 (ref 140–500)
POTASSIUM SERPL-SCNC: 4.2 MMOL/L (ref 3.5–5.2)
PROT SERPL-MCNC: 6.3 G/DL (ref 6–8.5)
PROT UR QL STRIP: (no result)
RBC # BLD AUTO: 4.63 10*6/MM3 (ref 3.9–5.2)
RBC #/AREA URNS HPF: ABNORMAL /HPF
SODIUM SERPL-SCNC: 136 MMOL/L (ref 136–145)
SP GR UR: 1.01 (ref 1–1.03)
TRIGL SERPL-MCNC: 178 MG/DL (ref 0–150)
UROBILINOGEN UR STRIP-MCNC: (no result) MG/DL
VIT B12 SERPL-MCNC: >2000 PG/ML (ref 211–946)
VLDLC SERPL CALC-MCNC: 35.6 MG/DL (ref 5–40)
WBC # BLD AUTO: 5.54 10*3/MM3 (ref 4.5–10.7)
WBC #/AREA URNS HPF: ABNORMAL /HPF

## 2018-09-18 ENCOUNTER — TELEPHONE (OUTPATIENT)
Dept: FAMILY MEDICINE CLINIC | Facility: CLINIC | Age: 83
End: 2018-09-18

## 2018-09-18 NOTE — TELEPHONE ENCOUNTER
CALLED PT AND SPOKE WITH SON AND LET PT KNOW PER DR. FLYNN AND LET PT'S SON KNOW PER DR. FLYNN HIS MOM'S VIT D LEVEL WAS TO HIGH. LET PT KNOW AT THAT TIME THAT PT'S VIT D LEVEL WAS WAY TO HIGH AT THAT TIME SO PT NEEDED TO DISCONTINUE ON VIT D. PT UNDERSTOOD. AND STATED MOM WAS NOT ON VIT D. AND SHE HAS TEETH PULLED AND WAS ONLY EATING YOGURT AND I LET HIM KNOW AT THAT TIME SHE NEEDED TO DISCONTINUE THE YOGURT. AND ANYTHING HIGH IN VIT D. PT'S DON UNDERSTOOD AND WILL F/U IN OFFICE ON Friday TO FURTHER DISCUSS RESULTS ON Friday.

## 2018-09-21 ENCOUNTER — OFFICE VISIT (OUTPATIENT)
Dept: FAMILY MEDICINE CLINIC | Facility: CLINIC | Age: 83
End: 2018-09-21

## 2018-09-21 VITALS
SYSTOLIC BLOOD PRESSURE: 112 MMHG | DIASTOLIC BLOOD PRESSURE: 86 MMHG | HEART RATE: 72 BPM | HEIGHT: 67 IN | TEMPERATURE: 98.1 F | OXYGEN SATURATION: 97 %

## 2018-09-21 DIAGNOSIS — I10 ESSENTIAL HYPERTENSION: ICD-10-CM

## 2018-09-21 DIAGNOSIS — E78.00 HYPERCHOLESTEROLEMIA: ICD-10-CM

## 2018-09-21 DIAGNOSIS — E53.8 VITAMIN B 12 DEFICIENCY: ICD-10-CM

## 2018-09-21 DIAGNOSIS — T45.2X1A POISONING BY VITAMIN D, ACCIDENTAL OR UNINTENTIONAL, INITIAL ENCOUNTER: Primary | ICD-10-CM

## 2018-09-21 DIAGNOSIS — F02.80 LATE ONSET ALZHEIMER'S DISEASE WITHOUT BEHAVIORAL DISTURBANCE (HCC): ICD-10-CM

## 2018-09-21 DIAGNOSIS — G30.1 LATE ONSET ALZHEIMER'S DISEASE WITHOUT BEHAVIORAL DISTURBANCE (HCC): ICD-10-CM

## 2018-09-21 PROCEDURE — 99214 OFFICE O/P EST MOD 30 MIN: CPT | Performed by: INTERNAL MEDICINE

## 2018-09-21 RX ORDER — DONEPEZIL HYDROCHLORIDE 10 MG/1
10 TABLET, FILM COATED ORAL NIGHTLY
Qty: 30 TABLET | Refills: 1 | Status: SHIPPED | OUTPATIENT
Start: 2018-09-21 | End: 2019-08-07 | Stop reason: SDUPTHER

## 2018-09-22 LAB — 25(OH)D3+25(OH)D2 SERPL-MCNC: >120 NG/ML (ref 30–100)

## 2018-09-23 ENCOUNTER — TELEPHONE (OUTPATIENT)
Dept: FAMILY MEDICINE CLINIC | Facility: CLINIC | Age: 83
End: 2018-09-23

## 2018-09-23 PROBLEM — E55.9 VITAMIN D DEFICIENCY DISEASE: Status: RESOLVED | Noted: 2018-01-30 | Resolved: 2018-09-23

## 2018-09-23 PROBLEM — E55.9 VITAMIN D DEFICIENCY: Status: RESOLVED | Noted: 2018-01-15 | Resolved: 2018-09-23

## 2018-09-23 RX ORDER — NITROFURANTOIN 25; 75 MG/1; MG/1
100 CAPSULE ORAL 2 TIMES DAILY
Qty: 14 CAPSULE | Refills: 0 | Status: SHIPPED | OUTPATIENT
Start: 2018-09-23 | End: 2019-04-11

## 2018-09-23 NOTE — PROGRESS NOTES
Subjective   Mary Alice Sanders is a 83 y.o. female. Patient is here today for   Chief Complaint   Patient presents with   • Follow-up   • Hypertension   • Hyperlipidemia          Vitals:    09/21/18 1400   BP: 112/86   Pulse: 72   Temp: 98.1 °F (36.7 °C)   SpO2: 97%       Past Medical History:   Diagnosis Date   • Allergic rhinitis    • Chest pain    • GERD (gastroesophageal reflux disease)    • Hx of migraine headaches    • Hx: UTI (urinary tract infection)    • Hypertension    • Varicose veins       Allergies   Allergen Reactions   • No Known Drug Allergy       Social History     Social History   • Marital status:      Spouse name: N/A   • Number of children: N/A   • Years of education: N/A     Occupational History   • Not on file.     Social History Main Topics   • Smoking status: Never Smoker   • Smokeless tobacco: Never Used   • Alcohol use No   • Drug use: Unknown   • Sexual activity: Defer     Other Topics Concern   • Not on file     Social History Narrative   • No narrative on file        Current Outpatient Prescriptions:   •  acetaminophen-codeine (TYLENOL #3) 300-30 MG per tablet, Take 1 tablet by mouth Every 6 (Six) Hours As Needed for Moderate Pain ., Disp: 12 tablet, Rfl: 0  •  amitriptyline (ELAVIL) 25 MG tablet, Take 1 tablet by mouth Every Night., Disp: 90 tablet, Rfl: 3  •  atorvastatin (LIPITOR) 40 MG tablet, Take 1 tablet by mouth Daily., Disp: 90 tablet, Rfl: 3  •  donepezil (ARICEPT) 10 MG tablet, Take 1 tablet by mouth Every Night., Disp: 30 tablet, Rfl: 1  •  DULoxetine (CYMBALTA) 60 MG capsule, Take 1 capsule by mouth Daily., Disp: 90 capsule, Rfl: 3  •  esomeprazole (NEXIUM) 40 MG capsule, Take 1 capsule by mouth Every Morning Before Breakfast., Disp: 90 capsule, Rfl: 3  •  memantine (NAMENDA) 10 MG tablet, Take 1 tablet by mouth 2 (Two) Times a Day., Disp: 180 tablet, Rfl: 3  •  propranolol (INDERAL) 20 MG tablet, Take 1 tablet by mouth 2 (Two) Times a Day., Disp: 180 tablet, Rfl: 3      Objective     She is here for follow-up on hypertension and hypercholesterolemia.    She has Alzheimer's dementia and is accompanied by her son per usual.    The patient has no complaints.      Hypertension     Hyperlipidemia          Review of Systems   Constitutional: Negative.    HENT: Negative.    Respiratory: Negative.    Cardiovascular: Negative.    Musculoskeletal: Negative.    Psychiatric/Behavioral: Positive for confusion.       Physical Exam   Constitutional: She is oriented to person, place, and time. She appears well-developed and well-nourished.   She has a very pleasant demeanor per usual.  Neatly groomed.   HENT:   Head: Normocephalic.   Cardiovascular: Normal rate, regular rhythm and normal heart sounds.    No murmur heard.  Pulmonary/Chest: Effort normal and breath sounds normal. No respiratory distress. She has no wheezes. She has no rales.   Neurological: She is alert and oriented to person, place, and time.   Psychiatric: She has a normal mood and affect. Her behavior is normal.   Nursing note and vitals reviewed.        Problem List Items Addressed This Visit        Cardiovascular and Mediastinum    Hypercholesterolemia    BP (high blood pressure)       Digestive    Vitamin B 12 deficiency       Nervous and Auditory    Alzheimer's disease    Relevant Medications    donepezil (ARICEPT) 10 MG tablet       Other    Vitamin D toxicity - Primary    Relevant Orders    Vitamin D 25 hydroxy (Completed)            PLAN  Her vitamin D and vitamin B12 are elevated.  I'm going to recheck a vitamin D.  Her son tells me that she is not taking any supplemental vitamin D.    I asked him to make sure that she is not taking either vitamin D or vitamin B12.    Her hypercholesterolemia is well-controlled.    Her hypertension is well-controlled.    Her Alzheimer's dementia appears to be progressing only very slowly.    I like to see her back in about 4 months anyway just to see how she is getting along.  No  Follow-up on file.

## 2018-09-23 NOTE — TELEPHONE ENCOUNTER
CALLED PT LM AND ADVISED HER MACROBID 100MG BID #[id number] HAD BEEN CALLED IN PER DR. FLYNN AND IF SHE CONTINUED TO HAVE THE SAME SYMPTOMS AFTER COMPLETING ANTIBIOTIC PLEASE F/U IN OFFICE. PT WILL CALL BACK WITH ANY QUESTIONS   ----- Message from Sidra Olivier sent at 9/21/2018  3:03 PM EDT -----  IF SHE NEEDS TO BE ON AN ANTIBIOTIC FOR HER UTI CALL INTO [facility] [address]    IF ANY QUESTIONS CALL [phone]

## 2018-09-24 ENCOUNTER — TELEPHONE (OUTPATIENT)
Dept: FAMILY MEDICINE CLINIC | Facility: CLINIC | Age: 83
End: 2018-09-24

## 2018-09-24 NOTE — TELEPHONE ENCOUNTER
RECEIVED A CALL FROM [facility] TODAY 120 VIT D LEVEL STILL. DISCUSSED WITH DR. FLYNN HE ADVISED RECHECK IN A MONTH. SPOKE WITH SON AND ADVISED HIM OF THE PLAN HE UNDERSTOOD AND WILL F/U IN ONE MONTH APPT SCHEDULED. DISCUSSED @ [facility] AS WELL ON WHAT NOT TO TAKE OR HAVE. PT'S SON UNDERSTOOD.

## 2018-10-11 DIAGNOSIS — E67.3 VITAMIN D INTOXICATION: Primary | ICD-10-CM

## 2018-10-15 RX ORDER — AMITRIPTYLINE HYDROCHLORIDE 25 MG/1
25 TABLET, FILM COATED ORAL NIGHTLY
Qty: 90 TABLET | Refills: 3 | Status: SHIPPED | OUTPATIENT
Start: 2018-10-15 | End: 2019-09-18 | Stop reason: SDUPTHER

## 2018-10-15 RX ORDER — PROPRANOLOL HYDROCHLORIDE 20 MG/1
20 TABLET ORAL 2 TIMES DAILY
Qty: 180 TABLET | Refills: 3 | Status: SHIPPED | OUTPATIENT
Start: 2018-10-15 | End: 2019-09-18 | Stop reason: SDUPTHER

## 2018-10-15 RX ORDER — ATORVASTATIN CALCIUM 40 MG/1
40 TABLET, FILM COATED ORAL DAILY
Qty: 90 TABLET | Refills: 3 | Status: SHIPPED | OUTPATIENT
Start: 2018-10-15 | End: 2019-09-18 | Stop reason: SDUPTHER

## 2018-10-15 RX ORDER — DULOXETIN HYDROCHLORIDE 60 MG/1
60 CAPSULE, DELAYED RELEASE ORAL DAILY
Qty: 90 CAPSULE | Refills: 3 | Status: SHIPPED | OUTPATIENT
Start: 2018-10-15 | End: 2019-09-18 | Stop reason: SDUPTHER

## 2018-10-15 RX ORDER — ESOMEPRAZOLE MAGNESIUM 40 MG/1
40 CAPSULE, DELAYED RELEASE ORAL
Qty: 90 CAPSULE | Refills: 3 | Status: SHIPPED | OUTPATIENT
Start: 2018-10-15 | End: 2019-09-18 | Stop reason: SDUPTHER

## 2018-10-26 DIAGNOSIS — E67.3 VITAMIN D INTOXICATION: Primary | ICD-10-CM

## 2018-10-26 DIAGNOSIS — T45.2X1D: ICD-10-CM

## 2018-10-31 LAB — 25(OH)D3+25(OH)D2 SERPL-MCNC: 69 NG/ML (ref 30–100)

## 2018-11-07 ENCOUNTER — TELEPHONE (OUTPATIENT)
Dept: FAMILY MEDICINE CLINIC | Facility: CLINIC | Age: 83
End: 2018-11-07

## 2018-11-07 NOTE — TELEPHONE ENCOUNTER
Called son and advised him of results and he understood 69.   ----- Message from Sidra Olivier sent at 11/6/2018  3:23 PM EST -----  PATIENTS SONE CALLED AND SAID THAT DR FLYNN ASKED M[facility] COOK TO COME BACK FOR A VIT D CHECK AND HE WAS WANTING TO GET THE RESULTS BACK FROM IT. PLEASE CALL SHIRA (SON) BACK [phone]    THANK YOU

## 2019-04-11 ENCOUNTER — OFFICE VISIT (OUTPATIENT)
Dept: FAMILY MEDICINE CLINIC | Facility: CLINIC | Age: 84
End: 2019-04-11

## 2019-04-11 VITALS
OXYGEN SATURATION: 97 % | SYSTOLIC BLOOD PRESSURE: 108 MMHG | WEIGHT: 135.6 LBS | HEIGHT: 67 IN | RESPIRATION RATE: 16 BRPM | BODY MASS INDEX: 21.28 KG/M2 | HEART RATE: 66 BPM | TEMPERATURE: 97.6 F | DIASTOLIC BLOOD PRESSURE: 72 MMHG

## 2019-04-11 DIAGNOSIS — E55.9 VITAMIN D DEFICIENCY: ICD-10-CM

## 2019-04-11 DIAGNOSIS — IMO0001 BLUES: ICD-10-CM

## 2019-04-11 DIAGNOSIS — R73.9 HYPERGLYCEMIA: ICD-10-CM

## 2019-04-11 DIAGNOSIS — T45.2X1A POISONING BY VITAMIN D, ACCIDENTAL OR UNINTENTIONAL, INITIAL ENCOUNTER: ICD-10-CM

## 2019-04-11 DIAGNOSIS — E78.00 HYPERCHOLESTEROLEMIA: ICD-10-CM

## 2019-04-11 DIAGNOSIS — I10 ESSENTIAL HYPERTENSION: ICD-10-CM

## 2019-04-11 DIAGNOSIS — G30.1 LATE ONSET ALZHEIMER'S DISEASE WITHOUT BEHAVIORAL DISTURBANCE (HCC): ICD-10-CM

## 2019-04-11 DIAGNOSIS — F02.80 LATE ONSET ALZHEIMER'S DISEASE WITHOUT BEHAVIORAL DISTURBANCE (HCC): ICD-10-CM

## 2019-04-11 DIAGNOSIS — L75.0 URINARY BODY ODOR: Primary | ICD-10-CM

## 2019-04-11 LAB
BILIRUB BLD-MCNC: NEGATIVE MG/DL
CLARITY, POC: ABNORMAL
COLOR UR: YELLOW
GLUCOSE UR STRIP-MCNC: NEGATIVE MG/DL
KETONES UR QL: NEGATIVE
LEUKOCYTE EST, POC: ABNORMAL
NITRITE UR-MCNC: NEGATIVE MG/ML
PH UR: 7 [PH] (ref 5–8)
PROT UR STRIP-MCNC: NEGATIVE MG/DL
RBC # UR STRIP: NEGATIVE /UL
SP GR UR: 1.03 (ref 1–1.03)
UROBILINOGEN UR QL: NORMAL

## 2019-04-11 PROCEDURE — 81003 URINALYSIS AUTO W/O SCOPE: CPT | Performed by: INTERNAL MEDICINE

## 2019-04-11 PROCEDURE — 99214 OFFICE O/P EST MOD 30 MIN: CPT | Performed by: INTERNAL MEDICINE

## 2019-04-11 RX ORDER — SULFAMETHOXAZOLE AND TRIMETHOPRIM 800; 160 MG/1; MG/1
1 TABLET ORAL 2 TIMES DAILY
Qty: 14 TABLET | Refills: 0 | Status: SHIPPED | OUTPATIENT
Start: 2019-04-11 | End: 2019-09-18

## 2019-04-12 LAB
25(OH)D3+25(OH)D2 SERPL-MCNC: 118.1 NG/ML (ref 30–100)
ALBUMIN SERPL-MCNC: 4 G/DL (ref 3.5–5.2)
ALBUMIN/GLOB SERPL: 1.7 G/DL
ALP SERPL-CCNC: 73 U/L (ref 39–117)
ALT SERPL-CCNC: 6 U/L (ref 1–33)
AST SERPL-CCNC: 11 U/L (ref 1–32)
BASOPHILS # BLD AUTO: 0.04 10*3/MM3 (ref 0–0.2)
BASOPHILS NFR BLD AUTO: 0.8 % (ref 0–1.5)
BILIRUB SERPL-MCNC: 0.4 MG/DL (ref 0.2–1.2)
BUN SERPL-MCNC: 7 MG/DL (ref 8–23)
BUN/CREAT SERPL: 10.9 (ref 7–25)
CALCIUM SERPL-MCNC: 9.5 MG/DL (ref 8.6–10.5)
CHLORIDE SERPL-SCNC: 98 MMOL/L (ref 98–107)
CHOLEST SERPL-MCNC: 177 MG/DL (ref 0–200)
CO2 SERPL-SCNC: 26.5 MMOL/L (ref 22–29)
CREAT SERPL-MCNC: 0.64 MG/DL (ref 0.57–1)
EOSINOPHIL # BLD AUTO: 0.1 10*3/MM3 (ref 0–0.4)
EOSINOPHIL NFR BLD AUTO: 2 % (ref 0.3–6.2)
ERYTHROCYTE [DISTWIDTH] IN BLOOD BY AUTOMATED COUNT: 13.9 % (ref 12.3–15.4)
GLOBULIN SER CALC-MCNC: 2.4 GM/DL
GLUCOSE SERPL-MCNC: 94 MG/DL (ref 65–99)
HBA1C MFR BLD: 5.35 % (ref 4.8–5.6)
HCT VFR BLD AUTO: 45 % (ref 34–46.6)
HDLC SERPL-MCNC: 66 MG/DL (ref 40–60)
HGB BLD-MCNC: 14.3 G/DL (ref 12–15.9)
IMM GRANULOCYTES # BLD AUTO: 0.01 10*3/MM3 (ref 0–0.05)
IMM GRANULOCYTES NFR BLD AUTO: 0.2 % (ref 0–0.5)
LDLC SERPL CALC-MCNC: 91 MG/DL (ref 0–100)
LDLC/HDLC SERPL: 1.38 {RATIO}
LYMPHOCYTES # BLD AUTO: 1.85 10*3/MM3 (ref 0.7–3.1)
LYMPHOCYTES NFR BLD AUTO: 36.3 % (ref 19.6–45.3)
MCH RBC QN AUTO: 32.9 PG (ref 26.6–33)
MCHC RBC AUTO-ENTMCNC: 31.8 G/DL (ref 31.5–35.7)
MCV RBC AUTO: 103.4 FL (ref 79–97)
MONOCYTES # BLD AUTO: 0.36 10*3/MM3 (ref 0.1–0.9)
MONOCYTES NFR BLD AUTO: 7.1 % (ref 5–12)
NEUTROPHILS # BLD AUTO: 2.73 10*3/MM3 (ref 1.4–7)
NEUTROPHILS NFR BLD AUTO: 53.6 % (ref 42.7–76)
NRBC BLD AUTO-RTO: 0 /100 WBC (ref 0–0)
PLATELET # BLD AUTO: 285 10*3/MM3 (ref 140–450)
POTASSIUM SERPL-SCNC: 4.3 MMOL/L (ref 3.5–5.2)
PROT SERPL-MCNC: 6.4 G/DL (ref 6–8.5)
RBC # BLD AUTO: 4.35 10*6/MM3 (ref 3.77–5.28)
SODIUM SERPL-SCNC: 137 MMOL/L (ref 136–145)
TRIGL SERPL-MCNC: 99 MG/DL (ref 0–150)
VLDLC SERPL CALC-MCNC: 19.8 MG/DL
WBC # BLD AUTO: 5.09 10*3/MM3 (ref 3.4–10.8)

## 2019-06-20 RX ORDER — MEMANTINE HYDROCHLORIDE 10 MG/1
10 TABLET ORAL 2 TIMES DAILY
Qty: 180 TABLET | Refills: 3 | Status: ON HOLD | OUTPATIENT
Start: 2019-06-20 | End: 2021-10-08

## 2019-08-07 RX ORDER — DONEPEZIL HYDROCHLORIDE 10 MG/1
10 TABLET, FILM COATED ORAL NIGHTLY
Qty: 90 TABLET | Refills: 3 | Status: SHIPPED | OUTPATIENT
Start: 2019-08-07 | End: 2020-09-01 | Stop reason: SDUPTHER

## 2019-09-11 DIAGNOSIS — R73.9 HYPERGLYCEMIA: ICD-10-CM

## 2019-09-11 DIAGNOSIS — E78.00 HYPERCHOLESTEROLEMIA: Primary | ICD-10-CM

## 2019-09-12 LAB
ALBUMIN SERPL-MCNC: 3.6 G/DL (ref 3.5–5.2)
ALBUMIN/GLOB SERPL: 1.7 G/DL
ALP SERPL-CCNC: 78 U/L (ref 39–117)
ALT SERPL-CCNC: 6 U/L (ref 1–33)
APPEARANCE UR: (no result)
AST SERPL-CCNC: 13 U/L (ref 1–32)
BACTERIA #/AREA URNS HPF: ABNORMAL /HPF
BASOPHILS # BLD AUTO: 0.04 10*3/MM3 (ref 0–0.2)
BASOPHILS NFR BLD AUTO: 0.7 % (ref 0–1.5)
BILIRUB SERPL-MCNC: 0.5 MG/DL (ref 0.2–1.2)
BILIRUB UR QL STRIP: NEGATIVE
BUN SERPL-MCNC: 8 MG/DL (ref 8–23)
BUN/CREAT SERPL: 14.8 (ref 7–25)
CALCIUM SERPL-MCNC: 9.1 MG/DL (ref 8.6–10.5)
CASTS URNS MICRO: ABNORMAL
CHLORIDE SERPL-SCNC: 98 MMOL/L (ref 98–107)
CHOLEST SERPL-MCNC: 149 MG/DL (ref 0–200)
CO2 SERPL-SCNC: 27.2 MMOL/L (ref 22–29)
COLOR UR: YELLOW
CREAT SERPL-MCNC: 0.54 MG/DL (ref 0.57–1)
EOSINOPHIL # BLD AUTO: 0.18 10*3/MM3 (ref 0–0.4)
EOSINOPHIL NFR BLD AUTO: 3 % (ref 0.3–6.2)
EPI CELLS #/AREA URNS HPF: ABNORMAL /HPF
ERYTHROCYTE [DISTWIDTH] IN BLOOD BY AUTOMATED COUNT: 14.3 % (ref 12.3–15.4)
GLOBULIN SER CALC-MCNC: 2.1 GM/DL
GLUCOSE SERPL-MCNC: 91 MG/DL (ref 65–99)
GLUCOSE UR QL: NEGATIVE
HBA1C MFR BLD: 5.2 % (ref 4.8–5.6)
HCT VFR BLD AUTO: 44.3 % (ref 34–46.6)
HDLC SERPL-MCNC: 51 MG/DL (ref 40–60)
HGB BLD-MCNC: 14.1 G/DL (ref 12–15.9)
HGB UR QL STRIP: NEGATIVE
IMM GRANULOCYTES # BLD AUTO: 0.03 10*3/MM3 (ref 0–0.05)
IMM GRANULOCYTES NFR BLD AUTO: 0.5 % (ref 0–0.5)
KETONES UR QL STRIP: NEGATIVE
LDLC SERPL CALC-MCNC: 63 MG/DL (ref 0–100)
LDLC/HDLC SERPL: 1.24 {RATIO}
LEUKOCYTE ESTERASE UR QL STRIP: (no result)
LYMPHOCYTES # BLD AUTO: 2.12 10*3/MM3 (ref 0.7–3.1)
LYMPHOCYTES NFR BLD AUTO: 35.8 % (ref 19.6–45.3)
MCH RBC QN AUTO: 32.6 PG (ref 26.6–33)
MCHC RBC AUTO-ENTMCNC: 31.8 G/DL (ref 31.5–35.7)
MCV RBC AUTO: 102.5 FL (ref 79–97)
MONOCYTES # BLD AUTO: 0.44 10*3/MM3 (ref 0.1–0.9)
MONOCYTES NFR BLD AUTO: 7.4 % (ref 5–12)
NEUTROPHILS # BLD AUTO: 3.11 10*3/MM3 (ref 1.7–7)
NEUTROPHILS NFR BLD AUTO: 52.6 % (ref 42.7–76)
NITRITE UR QL STRIP: POSITIVE
NRBC BLD AUTO-RTO: 0 /100 WBC (ref 0–0.2)
PH UR STRIP: 7 [PH] (ref 5–8)
PLATELET # BLD AUTO: 323 10*3/MM3 (ref 140–450)
POTASSIUM SERPL-SCNC: 4.3 MMOL/L (ref 3.5–5.2)
PROT SERPL-MCNC: 5.7 G/DL (ref 6–8.5)
PROT UR QL STRIP: NEGATIVE
RBC # BLD AUTO: 4.32 10*6/MM3 (ref 3.77–5.28)
RBC #/AREA URNS HPF: ABNORMAL /HPF
SODIUM SERPL-SCNC: 138 MMOL/L (ref 136–145)
SP GR UR: 1.02 (ref 1–1.03)
TRIGL SERPL-MCNC: 173 MG/DL (ref 0–150)
UROBILINOGEN UR STRIP-MCNC: (no result) MG/DL
VLDLC SERPL CALC-MCNC: 34.6 MG/DL
WBC # BLD AUTO: 5.92 10*3/MM3 (ref 3.4–10.8)
WBC #/AREA URNS HPF: ABNORMAL /HPF

## 2019-09-18 ENCOUNTER — OFFICE VISIT (OUTPATIENT)
Dept: FAMILY MEDICINE CLINIC | Facility: CLINIC | Age: 84
End: 2019-09-18

## 2019-09-18 VITALS
RESPIRATION RATE: 16 BRPM | WEIGHT: 130.4 LBS | DIASTOLIC BLOOD PRESSURE: 74 MMHG | SYSTOLIC BLOOD PRESSURE: 110 MMHG | HEIGHT: 67 IN | HEART RATE: 88 BPM | OXYGEN SATURATION: 98 % | BODY MASS INDEX: 20.47 KG/M2

## 2019-09-18 DIAGNOSIS — G30.1 LATE ONSET ALZHEIMER'S DISEASE WITHOUT BEHAVIORAL DISTURBANCE (HCC): ICD-10-CM

## 2019-09-18 DIAGNOSIS — F02.80 LATE ONSET ALZHEIMER'S DISEASE WITHOUT BEHAVIORAL DISTURBANCE (HCC): ICD-10-CM

## 2019-09-18 DIAGNOSIS — N30.90 CYSTITIS: ICD-10-CM

## 2019-09-18 DIAGNOSIS — I10 ESSENTIAL HYPERTENSION: Primary | ICD-10-CM

## 2019-09-18 DIAGNOSIS — IMO0001 BLUES: ICD-10-CM

## 2019-09-18 PROCEDURE — 99214 OFFICE O/P EST MOD 30 MIN: CPT | Performed by: INTERNAL MEDICINE

## 2019-09-18 RX ORDER — ATORVASTATIN CALCIUM 40 MG/1
40 TABLET, FILM COATED ORAL DAILY
Qty: 90 TABLET | Refills: 3 | Status: ON HOLD | OUTPATIENT
Start: 2019-09-18 | End: 2021-10-08

## 2019-09-18 RX ORDER — PROPRANOLOL HYDROCHLORIDE 20 MG/1
20 TABLET ORAL 2 TIMES DAILY
Qty: 180 TABLET | Refills: 3 | Status: ON HOLD | OUTPATIENT
Start: 2019-09-18 | End: 2021-10-08

## 2019-09-18 RX ORDER — ESOMEPRAZOLE MAGNESIUM 40 MG/1
40 CAPSULE, DELAYED RELEASE ORAL
Qty: 90 CAPSULE | Refills: 3 | Status: ON HOLD | OUTPATIENT
Start: 2019-09-18 | End: 2021-10-08

## 2019-09-18 RX ORDER — AMITRIPTYLINE HYDROCHLORIDE 25 MG/1
25 TABLET, FILM COATED ORAL NIGHTLY
Qty: 90 TABLET | Refills: 3 | Status: ON HOLD | OUTPATIENT
Start: 2019-09-18 | End: 2021-10-08

## 2019-09-18 RX ORDER — SULFAMETHOXAZOLE AND TRIMETHOPRIM 200; 40 MG/5ML; MG/5ML
10 SUSPENSION ORAL 2 TIMES DAILY
Qty: 150 ML | Refills: 1 | Status: SHIPPED | OUTPATIENT
Start: 2019-09-18 | End: 2020-10-24 | Stop reason: HOSPADM

## 2019-09-18 RX ORDER — DULOXETIN HYDROCHLORIDE 60 MG/1
60 CAPSULE, DELAYED RELEASE ORAL DAILY
Qty: 90 CAPSULE | Refills: 3 | Status: ON HOLD | OUTPATIENT
Start: 2019-09-18 | End: 2021-10-08

## 2019-09-20 NOTE — PROGRESS NOTES
Subjective   Mary Alice Sanders is a 84 y.o. female. Patient is here today for   Chief Complaint   Patient presents with   • Follow-up     htn          Vitals:    09/18/19 1355   BP: 110/74   Pulse: 88   Resp: 16   SpO2: 98%       Past Medical History:   Diagnosis Date   • Allergic rhinitis    • Chest pain    • GERD (gastroesophageal reflux disease)    • Hx of migraine headaches    • Hx: UTI (urinary tract infection)    • Hypertension    • Varicose veins       Allergies   Allergen Reactions   • No Known Drug Allergy       Social History     Socioeconomic History   • Marital status:      Spouse name: Not on file   • Number of children: Not on file   • Years of education: Not on file   • Highest education level: Not on file   Tobacco Use   • Smoking status: Never Smoker   • Smokeless tobacco: Never Used   Substance and Sexual Activity   • Alcohol use: No   • Drug use: Defer   • Sexual activity: Defer        Current Outpatient Medications:   •  acetaminophen-codeine (TYLENOL #3) 300-30 MG per tablet, Take 1 tablet by mouth Every 6 (Six) Hours As Needed for Moderate Pain ., Disp: 12 tablet, Rfl: 0  •  amitriptyline (ELAVIL) 25 MG tablet, Take 1 tablet by mouth Every Night., Disp: 90 tablet, Rfl: 3  •  atorvastatin (LIPITOR) 40 MG tablet, Take 1 tablet by mouth Daily., Disp: 90 tablet, Rfl: 3  •  donepezil (ARICEPT) 10 MG tablet, Take 1 tablet by mouth Every Night., Disp: 90 tablet, Rfl: 3  •  DULoxetine (CYMBALTA) 60 MG capsule, Take 1 capsule by mouth Daily., Disp: 90 capsule, Rfl: 3  •  esomeprazole (NEXIUM) 40 MG capsule, Take 1 capsule by mouth Every Morning Before Breakfast., Disp: 90 capsule, Rfl: 3  •  memantine (NAMENDA) 10 MG tablet, Take 1 tablet by mouth 2 (Two) Times a Day., Disp: 180 tablet, Rfl: 3  •  propranolol (INDERAL) 20 MG tablet, Take 1 tablet by mouth 2 (Two) Times a Day., Disp: 180 tablet, Rfl: 3  •  sulfamethoxazole-trimethoprim (BACTRIM,SEPTRA) 200-40 MG/5ML suspension, Take 10 mL by mouth 2  (Two) Times a Day., Disp: 150 mL, Rfl: 1     Objective     This pleasant patient is accompanied by her son per usual.  She has a severe Alzheimer's dementia.    She has no complaints.  He has no complaints on her behalf.         Review of Systems   Constitutional: Negative.    HENT: Negative.    Respiratory: Negative.    Cardiovascular: Negative.    Musculoskeletal: Negative.    Psychiatric/Behavioral: Negative.        Physical Exam   Constitutional: She appears well-developed and well-nourished.   Pleasant, neatly groomed, BMI 20.   HENT:   Head: Normocephalic and atraumatic.   Cardiovascular: Normal rate and regular rhythm.   Pulmonary/Chest: Effort normal and breath sounds normal.   Neurological: She is alert.   Psychiatric: She has a normal mood and affect. Her behavior is normal.   Nursing note and vitals reviewed.        Problem List Items Addressed This Visit        Cardiovascular and Mediastinum    BP (high blood pressure) - Primary    Relevant Medications    propranolol (INDERAL) 20 MG tablet       Nervous and Auditory    Alzheimer's disease    Relevant Medications    amitriptyline (ELAVIL) 25 MG tablet    DULoxetine (CYMBALTA) 60 MG capsule       Genitourinary    Cystitis       Other    Blues            PLAN  Her hypertension is well controlled.    I believe her depression is relatively well controlled.  Her son agrees.    Her dementia is severe and unchanged.    Her urinalysis shows a cystitis today.  I gave her a prescription for Bactrim.    I asked her to follow-up in 3 to 4 months.      No Follow-up on file.

## 2020-01-07 DIAGNOSIS — E78.5 HYPERLIPIDEMIA, UNSPECIFIED HYPERLIPIDEMIA TYPE: ICD-10-CM

## 2020-01-07 DIAGNOSIS — E78.00 HYPERCHOLESTEROLEMIA: ICD-10-CM

## 2020-01-07 DIAGNOSIS — R73.9 HYPERGLYCEMIA: Primary | ICD-10-CM

## 2020-03-05 LAB
ALBUMIN SERPL-MCNC: 4.1 G/DL (ref 3.5–5.2)
ALBUMIN/GLOB SERPL: 1.7 G/DL
ALP SERPL-CCNC: 63 U/L (ref 39–117)
ALT SERPL-CCNC: 10 U/L (ref 1–33)
AST SERPL-CCNC: 14 U/L (ref 1–32)
BASOPHILS # BLD AUTO: 0.03 10*3/MM3 (ref 0–0.2)
BASOPHILS NFR BLD AUTO: 0.6 % (ref 0–1.5)
BILIRUB SERPL-MCNC: 0.4 MG/DL (ref 0.2–1.2)
BUN SERPL-MCNC: 7 MG/DL (ref 8–23)
BUN/CREAT SERPL: 10.8 (ref 7–25)
CALCIUM SERPL-MCNC: 9.2 MG/DL (ref 8.6–10.5)
CHLORIDE SERPL-SCNC: 100 MMOL/L (ref 98–107)
CHOLEST SERPL-MCNC: 183 MG/DL (ref 0–200)
CO2 SERPL-SCNC: 25.7 MMOL/L (ref 22–29)
CREAT SERPL-MCNC: 0.65 MG/DL (ref 0.57–1)
EOSINOPHIL # BLD AUTO: 0.09 10*3/MM3 (ref 0–0.4)
EOSINOPHIL NFR BLD AUTO: 1.9 % (ref 0.3–6.2)
ERYTHROCYTE [DISTWIDTH] IN BLOOD BY AUTOMATED COUNT: 12.4 % (ref 12.3–15.4)
GLOBULIN SER CALC-MCNC: 2.4 GM/DL
GLUCOSE SERPL-MCNC: 96 MG/DL (ref 65–99)
HBA1C MFR BLD: 5.2 % (ref 4.8–5.6)
HCT VFR BLD AUTO: 42.3 % (ref 34–46.6)
HDLC SERPL-MCNC: 47 MG/DL (ref 40–60)
HGB BLD-MCNC: 14.2 G/DL (ref 12–15.9)
IMM GRANULOCYTES # BLD AUTO: 0.01 10*3/MM3 (ref 0–0.05)
IMM GRANULOCYTES NFR BLD AUTO: 0.2 % (ref 0–0.5)
LDLC SERPL CALC-MCNC: 98 MG/DL (ref 0–100)
LDLC/HDLC SERPL: 2.09 {RATIO}
LYMPHOCYTES # BLD AUTO: 1.98 10*3/MM3 (ref 0.7–3.1)
LYMPHOCYTES NFR BLD AUTO: 41.3 % (ref 19.6–45.3)
MCH RBC QN AUTO: 31.8 PG (ref 26.6–33)
MCHC RBC AUTO-ENTMCNC: 33.6 G/DL (ref 31.5–35.7)
MCV RBC AUTO: 94.8 FL (ref 79–97)
MONOCYTES # BLD AUTO: 0.34 10*3/MM3 (ref 0.1–0.9)
MONOCYTES NFR BLD AUTO: 7.1 % (ref 5–12)
NEUTROPHILS # BLD AUTO: 2.34 10*3/MM3 (ref 1.7–7)
NEUTROPHILS NFR BLD AUTO: 48.9 % (ref 42.7–76)
NRBC BLD AUTO-RTO: 0 /100 WBC (ref 0–0.2)
PLATELET # BLD AUTO: 305 10*3/MM3 (ref 140–450)
POTASSIUM SERPL-SCNC: 4.3 MMOL/L (ref 3.5–5.2)
PROT SERPL-MCNC: 6.5 G/DL (ref 6–8.5)
RBC # BLD AUTO: 4.46 10*6/MM3 (ref 3.77–5.28)
SODIUM SERPL-SCNC: 140 MMOL/L (ref 136–145)
TRIGL SERPL-MCNC: 189 MG/DL (ref 0–150)
UNABLE TO VOID: NORMAL
VLDLC SERPL CALC-MCNC: 37.8 MG/DL
WBC # BLD AUTO: 4.79 10*3/MM3 (ref 3.4–10.8)

## 2020-03-12 ENCOUNTER — TELEPHONE (OUTPATIENT)
Dept: FAMILY MEDICINE CLINIC | Facility: CLINIC | Age: 85
End: 2020-03-12

## 2020-03-12 NOTE — TELEPHONE ENCOUNTER
Pt has a dr huerta today, and pt immune system is compromised and wondering if the results can be called to her instead of coming in because of the corona virus.     Call deanna at [phone]    Thanks maya

## 2020-03-17 ENCOUNTER — TELEPHONE (OUTPATIENT)
Dept: FAMILY MEDICINE CLINIC | Facility: CLINIC | Age: 85
End: 2020-03-17

## 2020-03-17 NOTE — TELEPHONE ENCOUNTER
Pt's son needs letter stating that Mary Alice is unable to sign interment papers for broth Giana Caraballo due to Alzheimer's and need this completed today so they can have a burial tomorrow. Please call pt as soon as this is done.

## 2020-09-01 RX ORDER — DONEPEZIL HYDROCHLORIDE 10 MG/1
10 TABLET, FILM COATED ORAL NIGHTLY
Qty: 30 TABLET | Refills: 0 | Status: ON HOLD | OUTPATIENT
Start: 2020-09-01 | End: 2021-10-08

## 2020-09-01 NOTE — TELEPHONE ENCOUNTER
Caller: Mann Swift    Relationship: Emergency Contact    Best call back number: [phone]    Medication needed:   Requested Prescriptions     Pending Prescriptions Disp Refills   • donepezil (ARICEPT) 10 MG tablet 90 tablet 3     Sig: Take 1 tablet by mouth Every Night.       When do you need the refill by: 9/2    What details did the patient provide when requesting the medication: 90 DAYS WITH 3 REFILLS     Does the patient have less than a 3 day supply:  [] Yes  [x] No    What is the patient's preferred pharmacy: [facility] - [address] - [phone]  - [phone] FX

## 2020-10-19 DIAGNOSIS — R30.0 DYSURIA: ICD-10-CM

## 2020-10-19 DIAGNOSIS — R80.9 PROTEINURIA, UNSPECIFIED TYPE: ICD-10-CM

## 2020-10-19 DIAGNOSIS — R82.998 LEUKOCYTES IN URINE: Primary | ICD-10-CM

## 2020-10-19 LAB
BILIRUB BLD-MCNC: NEGATIVE MG/DL
CLARITY, POC: ABNORMAL
COLOR UR: ABNORMAL
GLUCOSE UR STRIP-MCNC: NEGATIVE MG/DL
KETONES UR QL: NEGATIVE
LEUKOCYTE EST, POC: ABNORMAL
NITRITE UR-MCNC: POSITIVE MG/ML
PH UR: 7.5 [PH] (ref 5–8)
PROT UR STRIP-MCNC: ABNORMAL MG/DL
RBC # UR STRIP: NEGATIVE /UL
SP GR UR: 1.02 (ref 1–1.03)
UROBILINOGEN UR QL: NORMAL

## 2020-10-19 PROCEDURE — 81003 URINALYSIS AUTO W/O SCOPE: CPT | Performed by: INTERNAL MEDICINE

## 2020-10-20 ENCOUNTER — TELEPHONE (OUTPATIENT)
Dept: FAMILY MEDICINE CLINIC | Facility: CLINIC | Age: 85
End: 2020-10-20

## 2020-10-20 ENCOUNTER — HOSPITAL ENCOUNTER (INPATIENT)
Facility: HOSPITAL | Age: 85
LOS: 3 days | Discharge: HOME | End: 2020-10-24
Attending: EMERGENCY MEDICINE | Admitting: HOSPITALIST

## 2020-10-20 ENCOUNTER — APPOINTMENT (OUTPATIENT)
Dept: CT IMAGING | Facility: HOSPITAL | Age: 85
End: 2020-10-20

## 2020-10-20 DIAGNOSIS — R53.1 WEAKNESS: ICD-10-CM

## 2020-10-20 DIAGNOSIS — F03.90 DEMENTIA WITHOUT BEHAVIORAL DISTURBANCE, UNSPECIFIED DEMENTIA TYPE: ICD-10-CM

## 2020-10-20 DIAGNOSIS — N39.0 COMPLICATED UTI (URINARY TRACT INFECTION): Primary | ICD-10-CM

## 2020-10-20 LAB
ALBUMIN SERPL-MCNC: 3.6 G/DL (ref 3.5–5.2)
ALBUMIN/GLOB SERPL: 1.1 G/DL
ALP SERPL-CCNC: 69 U/L (ref 39–117)
ALT SERPL W P-5'-P-CCNC: 8 U/L (ref 1–33)
AMORPH URATE CRY URNS QL MICRO: ABNORMAL /HPF
ANION GAP SERPL CALCULATED.3IONS-SCNC: 8.3 MMOL/L (ref 5–15)
AST SERPL-CCNC: 12 U/L (ref 1–32)
BACTERIA UR QL AUTO: ABNORMAL /HPF
BASOPHILS # BLD AUTO: 0.01 10*3/MM3 (ref 0–0.2)
BASOPHILS NFR BLD AUTO: 0.1 % (ref 0–1.5)
BILIRUB SERPL-MCNC: 0.8 MG/DL (ref 0–1.2)
BILIRUB UR QL STRIP: NEGATIVE
BUN SERPL-MCNC: 15 MG/DL (ref 8–23)
BUN/CREAT SERPL: 27.3 (ref 7–25)
CALCIUM SPEC-SCNC: 9.2 MG/DL (ref 8.6–10.5)
CHLORIDE SERPL-SCNC: 106 MMOL/L (ref 98–107)
CLARITY UR: ABNORMAL
CO2 SERPL-SCNC: 24.7 MMOL/L (ref 22–29)
COLOR UR: YELLOW
CREAT SERPL-MCNC: 0.55 MG/DL (ref 0.57–1)
D-LACTATE SERPL-SCNC: 1.5 MMOL/L (ref 0.5–2)
DEPRECATED RDW RBC AUTO: 42.2 FL (ref 37–54)
EOSINOPHIL # BLD AUTO: 0.02 10*3/MM3 (ref 0–0.4)
EOSINOPHIL NFR BLD AUTO: 0.3 % (ref 0.3–6.2)
ERYTHROCYTE [DISTWIDTH] IN BLOOD BY AUTOMATED COUNT: 12.4 % (ref 12.3–15.4)
GFR SERPL CREATININE-BSD FRML MDRD: 105 ML/MIN/1.73
GLOBULIN UR ELPH-MCNC: 3.2 GM/DL
GLUCOSE SERPL-MCNC: 110 MG/DL (ref 65–99)
GLUCOSE UR STRIP-MCNC: NEGATIVE MG/DL
HCT VFR BLD AUTO: 43.8 % (ref 34–46.6)
HGB BLD-MCNC: 14.8 G/DL (ref 12–15.9)
HGB UR QL STRIP.AUTO: ABNORMAL
HOLD SPECIMEN: NORMAL
HOLD SPECIMEN: NORMAL
HYALINE CASTS UR QL AUTO: ABNORMAL /LPF
IMM GRANULOCYTES # BLD AUTO: 0.01 10*3/MM3 (ref 0–0.05)
IMM GRANULOCYTES NFR BLD AUTO: 0.1 % (ref 0–0.5)
KETONES UR QL STRIP: ABNORMAL
LEUKOCYTE ESTERASE UR QL STRIP.AUTO: ABNORMAL
LYMPHOCYTES # BLD AUTO: 1.09 10*3/MM3 (ref 0.7–3.1)
LYMPHOCYTES NFR BLD AUTO: 14.2 % (ref 19.6–45.3)
MCH RBC QN AUTO: 32 PG (ref 26.6–33)
MCHC RBC AUTO-ENTMCNC: 33.8 G/DL (ref 31.5–35.7)
MCV RBC AUTO: 94.6 FL (ref 79–97)
MONOCYTES # BLD AUTO: 0.53 10*3/MM3 (ref 0.1–0.9)
MONOCYTES NFR BLD AUTO: 6.9 % (ref 5–12)
NEUTROPHILS NFR BLD AUTO: 6.01 10*3/MM3 (ref 1.7–7)
NEUTROPHILS NFR BLD AUTO: 78.4 % (ref 42.7–76)
NITRITE UR QL STRIP: POSITIVE
NRBC BLD AUTO-RTO: 0 /100 WBC (ref 0–0.2)
PH UR STRIP.AUTO: 7.5 [PH] (ref 5–8)
PLATELET # BLD AUTO: 206 10*3/MM3 (ref 140–450)
PMV BLD AUTO: 11 FL (ref 6–12)
POTASSIUM SERPL-SCNC: 4.3 MMOL/L (ref 3.5–5.2)
PROCALCITONIN SERPL-MCNC: 0.06 NG/ML (ref 0–0.25)
PROT SERPL-MCNC: 6.8 G/DL (ref 6–8.5)
PROT UR QL STRIP: ABNORMAL
RBC # BLD AUTO: 4.63 10*6/MM3 (ref 3.77–5.28)
RBC # UR: ABNORMAL /HPF
REF LAB TEST METHOD: ABNORMAL
SODIUM SERPL-SCNC: 139 MMOL/L (ref 136–145)
SP GR UR STRIP: 1.02 (ref 1–1.03)
SQUAMOUS #/AREA URNS HPF: ABNORMAL /HPF
UROBILINOGEN UR QL STRIP: ABNORMAL
WBC # BLD AUTO: 7.67 10*3/MM3 (ref 3.4–10.8)
WBC UR QL AUTO: ABNORMAL /HPF
WHOLE BLOOD HOLD SPECIMEN: NORMAL
WHOLE BLOOD HOLD SPECIMEN: NORMAL

## 2020-10-20 PROCEDURE — 81001 URINALYSIS AUTO W/SCOPE: CPT | Performed by: NURSE PRACTITIONER

## 2020-10-20 PROCEDURE — 25010000002 IOPAMIDOL 61 % SOLUTION: Performed by: EMERGENCY MEDICINE

## 2020-10-20 PROCEDURE — G0378 HOSPITAL OBSERVATION PER HR: HCPCS

## 2020-10-20 PROCEDURE — 87086 URINE CULTURE/COLONY COUNT: CPT | Performed by: NURSE PRACTITIONER

## 2020-10-20 PROCEDURE — 87040 BLOOD CULTURE FOR BACTERIA: CPT | Performed by: EMERGENCY MEDICINE

## 2020-10-20 PROCEDURE — 74177 CT ABD & PELVIS W/CONTRAST: CPT

## 2020-10-20 PROCEDURE — 80053 COMPREHEN METABOLIC PANEL: CPT | Performed by: NURSE PRACTITIONER

## 2020-10-20 PROCEDURE — 84145 PROCALCITONIN (PCT): CPT | Performed by: NURSE PRACTITIONER

## 2020-10-20 PROCEDURE — U0004 COV-19 TEST NON-CDC HGH THRU: HCPCS | Performed by: EMERGENCY MEDICINE

## 2020-10-20 PROCEDURE — P9612 CATHETERIZE FOR URINE SPEC: HCPCS

## 2020-10-20 PROCEDURE — 83605 ASSAY OF LACTIC ACID: CPT | Performed by: NURSE PRACTITIONER

## 2020-10-20 PROCEDURE — 85025 COMPLETE CBC W/AUTO DIFF WBC: CPT | Performed by: NURSE PRACTITIONER

## 2020-10-20 PROCEDURE — 99285 EMERGENCY DEPT VISIT HI MDM: CPT

## 2020-10-20 PROCEDURE — 25010000002 CEFTRIAXONE PER 250 MG: Performed by: EMERGENCY MEDICINE

## 2020-10-20 RX ORDER — SODIUM CHLORIDE 9 MG/ML
75 INJECTION, SOLUTION INTRAVENOUS CONTINUOUS
Status: DISCONTINUED | OUTPATIENT
Start: 2020-10-20 | End: 2020-10-23

## 2020-10-20 RX ORDER — MEMANTINE HYDROCHLORIDE 5 MG/1
5 TABLET ORAL 2 TIMES DAILY
Status: DISCONTINUED | OUTPATIENT
Start: 2020-10-20 | End: 2020-10-24 | Stop reason: HOSPADM

## 2020-10-20 RX ORDER — ACETAMINOPHEN 160 MG/5ML
650 SOLUTION ORAL EVERY 4 HOURS PRN
Status: DISCONTINUED | OUTPATIENT
Start: 2020-10-20 | End: 2020-10-24 | Stop reason: HOSPADM

## 2020-10-20 RX ORDER — CEFTRIAXONE SODIUM 1 G/50ML
1 INJECTION, SOLUTION INTRAVENOUS ONCE
Status: COMPLETED | OUTPATIENT
Start: 2020-10-20 | End: 2020-10-20

## 2020-10-20 RX ORDER — DONEPEZIL HYDROCHLORIDE 5 MG/1
5 TABLET, FILM COATED ORAL NIGHTLY
Status: DISCONTINUED | OUTPATIENT
Start: 2020-10-20 | End: 2020-10-24 | Stop reason: HOSPADM

## 2020-10-20 RX ORDER — ACETAMINOPHEN 325 MG/1
650 TABLET ORAL EVERY 4 HOURS PRN
Status: DISCONTINUED | OUTPATIENT
Start: 2020-10-20 | End: 2020-10-24 | Stop reason: HOSPADM

## 2020-10-20 RX ORDER — AMITRIPTYLINE HYDROCHLORIDE 25 MG/1
25 TABLET, FILM COATED ORAL NIGHTLY
Status: DISCONTINUED | OUTPATIENT
Start: 2020-10-20 | End: 2020-10-24 | Stop reason: HOSPADM

## 2020-10-20 RX ORDER — PROPRANOLOL HYDROCHLORIDE 20 MG/1
20 TABLET ORAL 2 TIMES DAILY
Status: DISCONTINUED | OUTPATIENT
Start: 2020-10-20 | End: 2020-10-24 | Stop reason: HOSPADM

## 2020-10-20 RX ORDER — PANTOPRAZOLE SODIUM 40 MG/1
40 TABLET, DELAYED RELEASE ORAL EVERY MORNING
Status: DISCONTINUED | OUTPATIENT
Start: 2020-10-21 | End: 2020-10-24 | Stop reason: HOSPADM

## 2020-10-20 RX ORDER — ACETAMINOPHEN 650 MG/1
650 SUPPOSITORY RECTAL EVERY 4 HOURS PRN
Status: DISCONTINUED | OUTPATIENT
Start: 2020-10-20 | End: 2020-10-24 | Stop reason: HOSPADM

## 2020-10-20 RX ORDER — SODIUM CHLORIDE 0.9 % (FLUSH) 0.9 %
10 SYRINGE (ML) INJECTION AS NEEDED
Status: DISCONTINUED | OUTPATIENT
Start: 2020-10-20 | End: 2020-10-24 | Stop reason: HOSPADM

## 2020-10-20 RX ORDER — ACETAMINOPHEN AND CODEINE PHOSPHATE 300; 30 MG/1; MG/1
1 TABLET ORAL EVERY 6 HOURS PRN
Status: DISCONTINUED | OUTPATIENT
Start: 2020-10-20 | End: 2020-10-24 | Stop reason: HOSPADM

## 2020-10-20 RX ORDER — CEFTRIAXONE SODIUM 1 G/50ML
1 INJECTION, SOLUTION INTRAVENOUS EVERY 24 HOURS
Status: DISCONTINUED | OUTPATIENT
Start: 2020-10-21 | End: 2020-10-22

## 2020-10-20 RX ORDER — SODIUM CHLORIDE 0.9 % (FLUSH) 0.9 %
10 SYRINGE (ML) INJECTION EVERY 12 HOURS SCHEDULED
Status: DISCONTINUED | OUTPATIENT
Start: 2020-10-20 | End: 2020-10-24 | Stop reason: HOSPADM

## 2020-10-20 RX ORDER — ONDANSETRON 2 MG/ML
4 INJECTION INTRAMUSCULAR; INTRAVENOUS EVERY 6 HOURS PRN
Status: DISCONTINUED | OUTPATIENT
Start: 2020-10-20 | End: 2020-10-24 | Stop reason: HOSPADM

## 2020-10-20 RX ORDER — ATORVASTATIN CALCIUM 20 MG/1
40 TABLET, FILM COATED ORAL DAILY
Status: DISCONTINUED | OUTPATIENT
Start: 2020-10-21 | End: 2020-10-24 | Stop reason: HOSPADM

## 2020-10-20 RX ORDER — DULOXETIN HYDROCHLORIDE 60 MG/1
60 CAPSULE, DELAYED RELEASE ORAL DAILY
Status: DISCONTINUED | OUTPATIENT
Start: 2020-10-21 | End: 2020-10-24 | Stop reason: HOSPADM

## 2020-10-20 RX ADMIN — SODIUM CHLORIDE 75 ML/HR: 9 INJECTION, SOLUTION INTRAVENOUS at 21:46

## 2020-10-20 RX ADMIN — PROPRANOLOL HYDROCHLORIDE 20 MG: 20 TABLET ORAL at 23:16

## 2020-10-20 RX ADMIN — SODIUM CHLORIDE 500 ML: 9 INJECTION, SOLUTION INTRAVENOUS at 12:11

## 2020-10-20 RX ADMIN — SODIUM CHLORIDE, PRESERVATIVE FREE 10 ML: 5 INJECTION INTRAVENOUS at 21:46

## 2020-10-20 RX ADMIN — DONEPEZIL HYDROCHLORIDE 5 MG: 5 TABLET, FILM COATED ORAL at 23:16

## 2020-10-20 RX ADMIN — IOPAMIDOL 100 ML: 612 INJECTION, SOLUTION INTRAVENOUS at 14:19

## 2020-10-20 RX ADMIN — MEMANTINE HYDROCHLORIDE 5 MG: 5 TABLET, FILM COATED ORAL at 23:16

## 2020-10-20 RX ADMIN — AMITRIPTYLINE HYDROCHLORIDE 25 MG: 25 TABLET, FILM COATED ORAL at 23:16

## 2020-10-20 RX ADMIN — CEFTRIAXONE SODIUM 1 G: 1 INJECTION, SOLUTION INTRAVENOUS at 13:53

## 2020-10-20 NOTE — TELEPHONE ENCOUNTER
PTS SON CALLED TO [facility] APPT WITH DR INIGUEZ. SON STATED SHE IS GETTING BAD AND IS GOING TO CALL HER AN AMBULANCE TO TAKE HER TO THE HOSPITAL.     SON IS ALSO WANTING TO KNOW WHAT THE RESULTS OF PTS URINE TEST WAS     PLEASE ADVISE     CALL BACK   [phone]

## 2020-10-20 NOTE — TELEPHONE ENCOUNTER
Pt's son aware of in office test and that a liquid antibiotic was sent in for pt. He states he believes pt is dehydrated and is going to EMT to look over her but will let them know about the test.

## 2020-10-21 LAB
ALBUMIN SERPL-MCNC: 3.6 G/DL (ref 3.5–5.2)
ALBUMIN/GLOB SERPL: 1.4 G/DL
ALP SERPL-CCNC: 63 U/L (ref 39–117)
ALT SERPL W P-5'-P-CCNC: 7 U/L (ref 1–33)
ANION GAP SERPL CALCULATED.3IONS-SCNC: 6 MMOL/L (ref 5–15)
AST SERPL-CCNC: 11 U/L (ref 1–32)
BACTERIA UR CULT: NORMAL
BACTERIA UR CULT: NORMAL
BASOPHILS # BLD AUTO: 0.03 10*3/MM3 (ref 0–0.2)
BASOPHILS NFR BLD AUTO: 0.4 % (ref 0–1.5)
BILIRUB SERPL-MCNC: 0.7 MG/DL (ref 0–1.2)
BUN SERPL-MCNC: 14 MG/DL (ref 8–23)
BUN/CREAT SERPL: 31.1 (ref 7–25)
CALCIUM SPEC-SCNC: 8.5 MG/DL (ref 8.6–10.5)
CHLORIDE SERPL-SCNC: 107 MMOL/L (ref 98–107)
CO2 SERPL-SCNC: 24 MMOL/L (ref 22–29)
CREAT SERPL-MCNC: 0.45 MG/DL (ref 0.57–1)
DEPRECATED RDW RBC AUTO: 40.9 FL (ref 37–54)
EOSINOPHIL # BLD AUTO: 0.04 10*3/MM3 (ref 0–0.4)
EOSINOPHIL NFR BLD AUTO: 0.5 % (ref 0.3–6.2)
ERYTHROCYTE [DISTWIDTH] IN BLOOD BY AUTOMATED COUNT: 12.1 % (ref 12.3–15.4)
GFR SERPL CREATININE-BSD FRML MDRD: 132 ML/MIN/1.73
GLOBULIN UR ELPH-MCNC: 2.6 GM/DL
GLUCOSE SERPL-MCNC: 103 MG/DL (ref 65–99)
HCT VFR BLD AUTO: 37.1 % (ref 34–46.6)
HGB BLD-MCNC: 12.9 G/DL (ref 12–15.9)
IMM GRANULOCYTES # BLD AUTO: 0.02 10*3/MM3 (ref 0–0.05)
IMM GRANULOCYTES NFR BLD AUTO: 0.3 % (ref 0–0.5)
LYMPHOCYTES # BLD AUTO: 1.71 10*3/MM3 (ref 0.7–3.1)
LYMPHOCYTES NFR BLD AUTO: 22.3 % (ref 19.6–45.3)
MCH RBC QN AUTO: 32.3 PG (ref 26.6–33)
MCHC RBC AUTO-ENTMCNC: 34.8 G/DL (ref 31.5–35.7)
MCV RBC AUTO: 93 FL (ref 79–97)
MONOCYTES # BLD AUTO: 0.64 10*3/MM3 (ref 0.1–0.9)
MONOCYTES NFR BLD AUTO: 8.3 % (ref 5–12)
NEUTROPHILS NFR BLD AUTO: 5.24 10*3/MM3 (ref 1.7–7)
NEUTROPHILS NFR BLD AUTO: 68.2 % (ref 42.7–76)
NRBC BLD AUTO-RTO: 0 /100 WBC (ref 0–0.2)
PLATELET # BLD AUTO: 246 10*3/MM3 (ref 140–450)
PMV BLD AUTO: 10.4 FL (ref 6–12)
POTASSIUM SERPL-SCNC: 3.9 MMOL/L (ref 3.5–5.2)
PROT SERPL-MCNC: 6.2 G/DL (ref 6–8.5)
RBC # BLD AUTO: 3.99 10*6/MM3 (ref 3.77–5.28)
SARS-COV-2 RNA RESP QL NAA+PROBE: NOT DETECTED
SODIUM SERPL-SCNC: 137 MMOL/L (ref 136–145)
WBC # BLD AUTO: 7.68 10*3/MM3 (ref 3.4–10.8)

## 2020-10-21 PROCEDURE — 25010000002 CEFTRIAXONE PER 250 MG: Performed by: INTERNAL MEDICINE

## 2020-10-21 PROCEDURE — 85025 COMPLETE CBC W/AUTO DIFF WBC: CPT | Performed by: INTERNAL MEDICINE

## 2020-10-21 PROCEDURE — 80053 COMPREHEN METABOLIC PANEL: CPT | Performed by: INTERNAL MEDICINE

## 2020-10-21 RX ADMIN — SODIUM CHLORIDE, PRESERVATIVE FREE 10 ML: 5 INJECTION INTRAVENOUS at 08:59

## 2020-10-21 RX ADMIN — AMITRIPTYLINE HYDROCHLORIDE 25 MG: 25 TABLET, FILM COATED ORAL at 20:33

## 2020-10-21 RX ADMIN — SODIUM CHLORIDE 75 ML/HR: 9 INJECTION, SOLUTION INTRAVENOUS at 13:02

## 2020-10-21 RX ADMIN — CEFTRIAXONE SODIUM 1 G: 1 INJECTION, SOLUTION INTRAVENOUS at 14:46

## 2020-10-21 RX ADMIN — PANTOPRAZOLE SODIUM 40 MG: 40 TABLET, DELAYED RELEASE ORAL at 06:41

## 2020-10-21 RX ADMIN — MEMANTINE HYDROCHLORIDE 5 MG: 5 TABLET, FILM COATED ORAL at 20:33

## 2020-10-21 RX ADMIN — SODIUM CHLORIDE, PRESERVATIVE FREE 10 ML: 5 INJECTION INTRAVENOUS at 20:33

## 2020-10-21 RX ADMIN — PROPRANOLOL HYDROCHLORIDE 20 MG: 20 TABLET ORAL at 20:33

## 2020-10-21 RX ADMIN — DONEPEZIL HYDROCHLORIDE 5 MG: 5 TABLET, FILM COATED ORAL at 20:33

## 2020-10-22 LAB
ANION GAP SERPL CALCULATED.3IONS-SCNC: 10.4 MMOL/L (ref 5–15)
BACTERIA SPEC AEROBE CULT: ABNORMAL
BASOPHILS # BLD AUTO: 0.03 10*3/MM3 (ref 0–0.2)
BASOPHILS NFR BLD AUTO: 0.4 % (ref 0–1.5)
BUN SERPL-MCNC: 13 MG/DL (ref 8–23)
BUN/CREAT SERPL: 31.7 (ref 7–25)
CALCIUM SPEC-SCNC: 8.8 MG/DL (ref 8.6–10.5)
CHLORIDE SERPL-SCNC: 103 MMOL/L (ref 98–107)
CO2 SERPL-SCNC: 21.6 MMOL/L (ref 22–29)
CREAT SERPL-MCNC: 0.41 MG/DL (ref 0.57–1)
DEPRECATED RDW RBC AUTO: 42.9 FL (ref 37–54)
EOSINOPHIL # BLD AUTO: 0.06 10*3/MM3 (ref 0–0.4)
EOSINOPHIL NFR BLD AUTO: 0.9 % (ref 0.3–6.2)
ERYTHROCYTE [DISTWIDTH] IN BLOOD BY AUTOMATED COUNT: 12.1 % (ref 12.3–15.4)
GFR SERPL CREATININE-BSD FRML MDRD: 147 ML/MIN/1.73
GLUCOSE SERPL-MCNC: 96 MG/DL (ref 65–99)
HCT VFR BLD AUTO: 38 % (ref 34–46.6)
HGB BLD-MCNC: 12.9 G/DL (ref 12–15.9)
IMM GRANULOCYTES # BLD AUTO: 0.03 10*3/MM3 (ref 0–0.05)
IMM GRANULOCYTES NFR BLD AUTO: 0.4 % (ref 0–0.5)
LYMPHOCYTES # BLD AUTO: 1.38 10*3/MM3 (ref 0.7–3.1)
LYMPHOCYTES NFR BLD AUTO: 19.8 % (ref 19.6–45.3)
MCH RBC QN AUTO: 32.6 PG (ref 26.6–33)
MCHC RBC AUTO-ENTMCNC: 33.9 G/DL (ref 31.5–35.7)
MCV RBC AUTO: 96 FL (ref 79–97)
MONOCYTES # BLD AUTO: 0.42 10*3/MM3 (ref 0.1–0.9)
MONOCYTES NFR BLD AUTO: 6 % (ref 5–12)
NEUTROPHILS NFR BLD AUTO: 5.06 10*3/MM3 (ref 1.7–7)
NEUTROPHILS NFR BLD AUTO: 72.5 % (ref 42.7–76)
NRBC BLD AUTO-RTO: 0 /100 WBC (ref 0–0.2)
PLATELET # BLD AUTO: 250 10*3/MM3 (ref 140–450)
PMV BLD AUTO: 10.5 FL (ref 6–12)
POTASSIUM SERPL-SCNC: 3.5 MMOL/L (ref 3.5–5.2)
RBC # BLD AUTO: 3.96 10*6/MM3 (ref 3.77–5.28)
SODIUM SERPL-SCNC: 135 MMOL/L (ref 136–145)
WBC # BLD AUTO: 6.98 10*3/MM3 (ref 3.4–10.8)

## 2020-10-22 PROCEDURE — 85025 COMPLETE CBC W/AUTO DIFF WBC: CPT | Performed by: HOSPITALIST

## 2020-10-22 PROCEDURE — 25010000002 AMPICILLIN PER 500 MG: Performed by: HOSPITALIST

## 2020-10-22 PROCEDURE — 80048 BASIC METABOLIC PNL TOTAL CA: CPT | Performed by: HOSPITALIST

## 2020-10-22 RX ADMIN — MEMANTINE HYDROCHLORIDE 5 MG: 5 TABLET, FILM COATED ORAL at 20:34

## 2020-10-22 RX ADMIN — DULOXETINE HYDROCHLORIDE 60 MG: 60 CAPSULE, DELAYED RELEASE ORAL at 07:57

## 2020-10-22 RX ADMIN — MEMANTINE HYDROCHLORIDE 5 MG: 5 TABLET, FILM COATED ORAL at 07:56

## 2020-10-22 RX ADMIN — SODIUM CHLORIDE, PRESERVATIVE FREE 10 ML: 5 INJECTION INTRAVENOUS at 20:35

## 2020-10-22 RX ADMIN — ATORVASTATIN CALCIUM 40 MG: 20 TABLET, FILM COATED ORAL at 07:56

## 2020-10-22 RX ADMIN — AMPICILLIN SODIUM 1 G: 1 INJECTION, POWDER, FOR SOLUTION INTRAMUSCULAR; INTRAVENOUS at 17:15

## 2020-10-22 RX ADMIN — PROPRANOLOL HYDROCHLORIDE 20 MG: 20 TABLET ORAL at 20:34

## 2020-10-22 RX ADMIN — DONEPEZIL HYDROCHLORIDE 5 MG: 5 TABLET, FILM COATED ORAL at 20:34

## 2020-10-22 RX ADMIN — AMITRIPTYLINE HYDROCHLORIDE 25 MG: 25 TABLET, FILM COATED ORAL at 20:34

## 2020-10-22 RX ADMIN — SODIUM CHLORIDE, PRESERVATIVE FREE 10 ML: 5 INJECTION INTRAVENOUS at 10:06

## 2020-10-22 RX ADMIN — SODIUM CHLORIDE 75 ML/HR: 9 INJECTION, SOLUTION INTRAVENOUS at 20:34

## 2020-10-22 RX ADMIN — AMPICILLIN SODIUM 1 G: 1 INJECTION, POWDER, FOR SOLUTION INTRAMUSCULAR; INTRAVENOUS at 12:22

## 2020-10-22 RX ADMIN — PROPRANOLOL HYDROCHLORIDE 20 MG: 20 TABLET ORAL at 07:57

## 2020-10-22 RX ADMIN — SODIUM CHLORIDE 75 ML/HR: 9 INJECTION, SOLUTION INTRAVENOUS at 03:03

## 2020-10-22 RX ADMIN — PANTOPRAZOLE SODIUM 40 MG: 40 TABLET, DELAYED RELEASE ORAL at 07:56

## 2020-10-23 LAB
ANION GAP SERPL CALCULATED.3IONS-SCNC: 9.6 MMOL/L (ref 5–15)
BASOPHILS # BLD AUTO: 0.02 10*3/MM3 (ref 0–0.2)
BASOPHILS NFR BLD AUTO: 0.3 % (ref 0–1.5)
BUN SERPL-MCNC: 12 MG/DL (ref 8–23)
BUN/CREAT SERPL: 31.6 (ref 7–25)
CALCIUM SPEC-SCNC: 8.3 MG/DL (ref 8.6–10.5)
CHLORIDE SERPL-SCNC: 105 MMOL/L (ref 98–107)
CO2 SERPL-SCNC: 21.4 MMOL/L (ref 22–29)
CREAT SERPL-MCNC: 0.38 MG/DL (ref 0.57–1)
DEPRECATED RDW RBC AUTO: 42 FL (ref 37–54)
EOSINOPHIL # BLD AUTO: 0.1 10*3/MM3 (ref 0–0.4)
EOSINOPHIL NFR BLD AUTO: 1.3 % (ref 0.3–6.2)
ERYTHROCYTE [DISTWIDTH] IN BLOOD BY AUTOMATED COUNT: 12.2 % (ref 12.3–15.4)
GFR SERPL CREATININE-BSD FRML MDRD: >150 ML/MIN/1.73
GLUCOSE SERPL-MCNC: 86 MG/DL (ref 65–99)
HCT VFR BLD AUTO: 34.1 % (ref 34–46.6)
HGB BLD-MCNC: 11.9 G/DL (ref 12–15.9)
IMM GRANULOCYTES # BLD AUTO: 0.02 10*3/MM3 (ref 0–0.05)
IMM GRANULOCYTES NFR BLD AUTO: 0.3 % (ref 0–0.5)
LYMPHOCYTES # BLD AUTO: 1.29 10*3/MM3 (ref 0.7–3.1)
LYMPHOCYTES NFR BLD AUTO: 17.1 % (ref 19.6–45.3)
MCH RBC QN AUTO: 32.6 PG (ref 26.6–33)
MCHC RBC AUTO-ENTMCNC: 34.9 G/DL (ref 31.5–35.7)
MCV RBC AUTO: 93.4 FL (ref 79–97)
MONOCYTES # BLD AUTO: 0.61 10*3/MM3 (ref 0.1–0.9)
MONOCYTES NFR BLD AUTO: 8.1 % (ref 5–12)
NEUTROPHILS NFR BLD AUTO: 5.5 10*3/MM3 (ref 1.7–7)
NEUTROPHILS NFR BLD AUTO: 72.9 % (ref 42.7–76)
NRBC BLD AUTO-RTO: 0 /100 WBC (ref 0–0.2)
PLATELET # BLD AUTO: 234 10*3/MM3 (ref 140–450)
PMV BLD AUTO: 10.5 FL (ref 6–12)
POTASSIUM SERPL-SCNC: 3.3 MMOL/L (ref 3.5–5.2)
RBC # BLD AUTO: 3.65 10*6/MM3 (ref 3.77–5.28)
SODIUM SERPL-SCNC: 136 MMOL/L (ref 136–145)
WBC # BLD AUTO: 7.54 10*3/MM3 (ref 3.4–10.8)

## 2020-10-23 PROCEDURE — 80048 BASIC METABOLIC PNL TOTAL CA: CPT | Performed by: HOSPITALIST

## 2020-10-23 PROCEDURE — 25010000002 AMPICILLIN PER 500 MG: Performed by: HOSPITALIST

## 2020-10-23 PROCEDURE — 97110 THERAPEUTIC EXERCISES: CPT

## 2020-10-23 PROCEDURE — 97161 PT EVAL LOW COMPLEX 20 MIN: CPT

## 2020-10-23 PROCEDURE — 85025 COMPLETE CBC W/AUTO DIFF WBC: CPT | Performed by: HOSPITALIST

## 2020-10-23 RX ORDER — POTASSIUM CHLORIDE 750 MG/1
40 TABLET, FILM COATED, EXTENDED RELEASE ORAL ONCE
Status: COMPLETED | OUTPATIENT
Start: 2020-10-23 | End: 2020-10-23

## 2020-10-23 RX ADMIN — AMPICILLIN SODIUM 1 G: 1 INJECTION, POWDER, FOR SOLUTION INTRAMUSCULAR; INTRAVENOUS at 17:06

## 2020-10-23 RX ADMIN — SODIUM CHLORIDE, PRESERVATIVE FREE 10 ML: 5 INJECTION INTRAVENOUS at 08:44

## 2020-10-23 RX ADMIN — MEMANTINE HYDROCHLORIDE 5 MG: 5 TABLET, FILM COATED ORAL at 08:43

## 2020-10-23 RX ADMIN — AMPICILLIN SODIUM 1 G: 1 INJECTION, POWDER, FOR SOLUTION INTRAMUSCULAR; INTRAVENOUS at 00:22

## 2020-10-23 RX ADMIN — SODIUM CHLORIDE, PRESERVATIVE FREE 10 ML: 5 INJECTION INTRAVENOUS at 21:06

## 2020-10-23 RX ADMIN — MEMANTINE HYDROCHLORIDE 5 MG: 5 TABLET, FILM COATED ORAL at 21:06

## 2020-10-23 RX ADMIN — PROPRANOLOL HYDROCHLORIDE 20 MG: 20 TABLET ORAL at 08:43

## 2020-10-23 RX ADMIN — AMPICILLIN SODIUM 1 G: 1 INJECTION, POWDER, FOR SOLUTION INTRAMUSCULAR; INTRAVENOUS at 06:33

## 2020-10-23 RX ADMIN — DONEPEZIL HYDROCHLORIDE 5 MG: 5 TABLET, FILM COATED ORAL at 21:06

## 2020-10-23 RX ADMIN — AMPICILLIN SODIUM 1 G: 1 INJECTION, POWDER, FOR SOLUTION INTRAMUSCULAR; INTRAVENOUS at 23:41

## 2020-10-23 RX ADMIN — POTASSIUM CHLORIDE 40 MEQ: 750 TABLET, EXTENDED RELEASE ORAL at 12:19

## 2020-10-23 RX ADMIN — DULOXETINE HYDROCHLORIDE 60 MG: 60 CAPSULE, DELAYED RELEASE ORAL at 08:43

## 2020-10-23 RX ADMIN — PANTOPRAZOLE SODIUM 40 MG: 40 TABLET, DELAYED RELEASE ORAL at 08:43

## 2020-10-23 RX ADMIN — SODIUM CHLORIDE 75 ML/HR: 9 INJECTION, SOLUTION INTRAVENOUS at 11:25

## 2020-10-23 RX ADMIN — PROPRANOLOL HYDROCHLORIDE 20 MG: 20 TABLET ORAL at 21:06

## 2020-10-23 RX ADMIN — AMPICILLIN SODIUM 1 G: 1 INJECTION, POWDER, FOR SOLUTION INTRAMUSCULAR; INTRAVENOUS at 11:25

## 2020-10-23 RX ADMIN — AMITRIPTYLINE HYDROCHLORIDE 25 MG: 25 TABLET, FILM COATED ORAL at 21:06

## 2020-10-23 RX ADMIN — ATORVASTATIN CALCIUM 40 MG: 20 TABLET, FILM COATED ORAL at 08:43

## 2020-10-24 ENCOUNTER — READMISSION MANAGEMENT (OUTPATIENT)
Dept: CALL CENTER | Facility: HOSPITAL | Age: 85
End: 2020-10-24

## 2020-10-24 VITALS
DIASTOLIC BLOOD PRESSURE: 76 MMHG | HEIGHT: 65 IN | BODY MASS INDEX: 24.28 KG/M2 | WEIGHT: 145.72 LBS | TEMPERATURE: 98.3 F | HEART RATE: 60 BPM | RESPIRATION RATE: 18 BRPM | SYSTOLIC BLOOD PRESSURE: 118 MMHG | OXYGEN SATURATION: 92 %

## 2020-10-24 LAB
ANION GAP SERPL CALCULATED.3IONS-SCNC: 6.8 MMOL/L (ref 5–15)
BUN SERPL-MCNC: 13 MG/DL (ref 8–23)
BUN/CREAT SERPL: 30.2 (ref 7–25)
CALCIUM SPEC-SCNC: 8.4 MG/DL (ref 8.6–10.5)
CHLORIDE SERPL-SCNC: 105 MMOL/L (ref 98–107)
CO2 SERPL-SCNC: 24.2 MMOL/L (ref 22–29)
CREAT SERPL-MCNC: 0.43 MG/DL (ref 0.57–1)
GFR SERPL CREATININE-BSD FRML MDRD: 140 ML/MIN/1.73
GLUCOSE SERPL-MCNC: 101 MG/DL (ref 65–99)
POTASSIUM SERPL-SCNC: 3.3 MMOL/L (ref 3.5–5.2)
SODIUM SERPL-SCNC: 136 MMOL/L (ref 136–145)

## 2020-10-24 PROCEDURE — 80048 BASIC METABOLIC PNL TOTAL CA: CPT | Performed by: HOSPITALIST

## 2020-10-24 PROCEDURE — 25010000002 AMPICILLIN PER 500 MG: Performed by: HOSPITALIST

## 2020-10-24 RX ORDER — POTASSIUM CHLORIDE 750 MG/1
40 TABLET, FILM COATED, EXTENDED RELEASE ORAL ONCE
Status: COMPLETED | OUTPATIENT
Start: 2020-10-24 | End: 2020-10-24

## 2020-10-24 RX ORDER — AMOXICILLIN 250 MG/5ML
250 POWDER, FOR SUSPENSION ORAL EVERY 8 HOURS SCHEDULED
Status: DISCONTINUED | OUTPATIENT
Start: 2020-10-24 | End: 2020-10-24 | Stop reason: HOSPADM

## 2020-10-24 RX ORDER — AMOXICILLIN 250 MG/5ML
250 POWDER, FOR SUSPENSION ORAL EVERY 8 HOURS SCHEDULED
Qty: 45 ML | Refills: 0 | Status: SHIPPED | OUTPATIENT
Start: 2020-10-24 | End: 2020-10-27

## 2020-10-24 RX ORDER — AMOXICILLIN 875 MG/1
875 TABLET, COATED ORAL EVERY 12 HOURS SCHEDULED
Status: DISCONTINUED | OUTPATIENT
Start: 2020-10-24 | End: 2020-10-24

## 2020-10-24 RX ADMIN — SODIUM CHLORIDE, PRESERVATIVE FREE 10 ML: 5 INJECTION INTRAVENOUS at 09:05

## 2020-10-24 RX ADMIN — PANTOPRAZOLE SODIUM 40 MG: 40 TABLET, DELAYED RELEASE ORAL at 05:56

## 2020-10-24 RX ADMIN — MEMANTINE HYDROCHLORIDE 5 MG: 5 TABLET, FILM COATED ORAL at 09:05

## 2020-10-24 RX ADMIN — AMPICILLIN SODIUM 1 G: 1 INJECTION, POWDER, FOR SOLUTION INTRAMUSCULAR; INTRAVENOUS at 05:56

## 2020-10-24 RX ADMIN — POTASSIUM CHLORIDE 40 MEQ: 750 TABLET, EXTENDED RELEASE ORAL at 13:12

## 2020-10-24 RX ADMIN — DULOXETINE HYDROCHLORIDE 60 MG: 60 CAPSULE, DELAYED RELEASE ORAL at 09:05

## 2020-10-24 RX ADMIN — ATORVASTATIN CALCIUM 40 MG: 20 TABLET, FILM COATED ORAL at 09:05

## 2020-10-24 RX ADMIN — PROPRANOLOL HYDROCHLORIDE 20 MG: 20 TABLET ORAL at 09:05

## 2020-10-24 NOTE — OUTREACH NOTE
Prep Survey      Responses   [facility] facility patient discharged from?  [place]   Is LACE score < 7 ?  No   Eligibility  [facility]   Date of Admission  10/20/20   Date of Discharge  10/24/20   Discharge Disposition  Home or Self Care   Discharge diagnosis  Complicated UTI   Does the patient have one of the following disease processes/diagnoses(primary or secondary)?  Other   Does the patient have Home health ordered?  Yes   What is the Home health agency?   [facility]   Is there a DME ordered?  Yes   What DME was ordered?  [facility] via [facility]   Prep survey completed?  Yes          Lucy Madden RN

## 2020-10-25 LAB
BACTERIA SPEC AEROBE CULT: NORMAL
BACTERIA SPEC AEROBE CULT: NORMAL

## 2020-10-26 ENCOUNTER — TRANSITIONAL CARE MANAGEMENT TELEPHONE ENCOUNTER (OUTPATIENT)
Dept: CALL CENTER | Facility: HOSPITAL | Age: 85
End: 2020-10-26

## 2020-10-26 NOTE — OUTREACH NOTE
Call Center TCM Note      Responses   [facility] patient discharged from?  [place]   Does the patient have one of the following disease processes/diagnoses(primary or secondary)?  Other   TCM attempt successful?  Yes   Call start time  1620   Call end time  1621   Discharge diagnosis  Complicated UTI   Is patient permission given to speak with other caregiver?  Yes   List who call center can speak with  sandra Darnell   Person spoke with today (if not patient) and relationship  sonBala Darnell   Meds reviewed with patient/caregiver?  Yes   Is the patient having any side effects they believe may be caused by any medication additions or changes?  No   Does the patient have all medications ordered at discharge?  Yes   Is the patient taking all medications as directed (includes completed medication regime)?  Yes   Does the patient have a primary care provider?   Yes   Does the patient have an appointment with their PCP within 7 days of discharge?  N/A   Has the patient kept scheduled appointments due by today?  N/A   What is the Home health agency?   [facility]   Has home health visited the patient within 72 hours of discharge?  Yes   What DME was ordered?  [facility] via [place]   Has all DME been delivered?  Yes   Did the patient receive a copy of their discharge instructions?  Yes   Nursing interventions  Reviewed instructions with patient   What is the patient's perception of their health status since discharge?  Improving   Is the patient/caregiver able to teach back the hierarchy of who to call/visit for symptoms/problems? PCP, Specialist, Home health nurse, Urgent Care, ED, 911  Yes   TCM call completed?  Yes   Wrap up additional comments  Per son, pt is doing great,  PT was their yesterday and worked with patient, no questions or concerns at this time.          Yanira Anderson RN    10/26/2020, 16:21 EDT

## 2020-11-02 ENCOUNTER — READMISSION MANAGEMENT (OUTPATIENT)
Dept: CALL CENTER | Facility: HOSPITAL | Age: 85
End: 2020-11-02

## 2020-11-02 NOTE — OUTREACH NOTE
Medical Week 2 Survey      Responses   [facility] patient discharged from?  [place]   Does the patient have one of the following disease processes/diagnoses(primary or secondary)?  Other   Week 2 attempt successful?  Yes   Call start time  1308   Discharge diagnosis  Complicated UTI   Call end time  1311   Person spoke with today (if not patient) and relationship  son- Mann Celaya reviewed with patient/caregiver?  Yes   Is the patient taking all medications as directed (includes completed medication regime)?  Yes   Medication comments  Completed antibiotic   Has the patient kept scheduled appointments due by today?  N/A   Comments  Son is waiting for her to get a little better. Hopefully next week.   What is the Home health agency?   [facility]   What is the patient's perception of their health status since discharge?  Improving   Additional teach back comments  Very pleased with progress, getting a little better every day   Week 2 Call Completed?  Yes          Nishi Llanos RN

## 2020-11-10 ENCOUNTER — READMISSION MANAGEMENT (OUTPATIENT)
Dept: CALL CENTER | Facility: HOSPITAL | Age: 85
End: 2020-11-10

## 2020-11-10 NOTE — OUTREACH NOTE
Medical Week 3 Survey      Responses   [facility] [place] patient discharged from?  [place]   Does the patient have one of the following disease processes/diagnoses(primary or secondary)?  Other   Week 3 attempt successful?  No   Unsuccessful attempts  Attempt 1   Call end time  1442   Medication alerts for this patient  no medication changes   Meds reviewed with patient/caregiver?  Yes   Is the patient taking all medications as directed (includes completed medication regime)?  Yes   Has the patient kept scheduled appointments due by today?  Yes   Comments  will be having an appointment next   What is the patient's perception of their health status since discharge?  Improving   Week 3 Call Completed?  Yes   Wrap up additional comments  son feels she is doing well          Unique Barillas RN

## 2020-11-20 ENCOUNTER — READMISSION MANAGEMENT (OUTPATIENT)
Dept: CALL CENTER | Facility: HOSPITAL | Age: 85
End: 2020-11-20

## 2020-11-20 NOTE — OUTREACH NOTE
Medical Week 4 Survey      Responses   [facility] patient discharged from?  [place]   Does the patient have one of the following disease processes/diagnoses(primary or secondary)?  Other   Week 4 attempt successful?  Yes   Call start time  1500   Call end time  1501   Person spoke with today (if not patient) and relationship  son- Mann Celaya reviewed with patient/caregiver?  Yes   Is the patient taking all medications as directed (includes completed medication regime)?  Yes   Has the patient kept scheduled appointments due by today?  Yes   Is the patient still receiving Home Health Services?  N/A   What is the patient's perception of their health status since discharge?  Improving   Week 4 Call Completed?  Yes   Would the patient like one additional call?  No   Graduated  Yes   Did the patient feel the follow up calls were helpful during their recovery period?  Yes          Nishi Llanos RN

## 2021-01-08 ENCOUNTER — OFFICE VISIT (OUTPATIENT)
Dept: FAMILY MEDICINE CLINIC | Facility: CLINIC | Age: 86
End: 2021-01-08

## 2021-01-08 DIAGNOSIS — Z00.00 MEDICARE ANNUAL WELLNESS VISIT, SUBSEQUENT: Primary | ICD-10-CM

## 2021-01-08 PROCEDURE — G0439 PPPS, SUBSEQ VISIT: HCPCS | Performed by: NURSE PRACTITIONER

## 2021-01-08 NOTE — PROGRESS NOTES
The ABCs of the Annual Wellness Visit  Subsequent Medicare Wellness Visit    Chief Complaint   Patient presents with   • Medicare Wellness-subsequent     You have chosen to receive care through a telephone visit. Do you consent to use a telephone visit for your medical care today? Yes  Pt with dementia, pt's son Mann Swift answering questions on pt's behalf.     History of Present Illness:  Mary Alice Sanders is a 85 y.o. female who presents for a Subsequent Medicare Wellness Visit.  Subjective   HEALTH RISK ASSESSMENT    Recent Hospitalizations:  Recently treated at the following:  [facility]    Current Medical Providers:  Patient Care Team:  Gera Barker MD as PCP - General  Gera Barker MD as PCP - Family Medicine    Smoking Status:  Social History     Tobacco Use   Smoking Status Never Smoker   Smokeless Tobacco Never Used       Alcohol Consumption:  Social History     Substance and Sexual Activity   Alcohol Use No       Depression Screen:   PHQ-2/PHQ-9 Depression Screening 1/8/2021   Little interest or pleasure in doing things 0   Feeling down, depressed, or hopeless 0   Trouble falling or staying asleep, or sleeping too much 0   Feeling tired or having little energy 1   Poor appetite or overeating 2   Feeling bad about yourself - or that you are a failure or have let yourself or your family down 0   Trouble concentrating on things, such as reading the newspaper or watching television 1   Moving or speaking so slowly that other people could have noticed. Or the opposite - being so fidgety or restless that you have been moving around a lot more than usual 2   Thoughts that you would be better off dead, or of hurting yourself in some way 0   Total Score 6       Fall Risk Screen:  STEADI Fall Risk Assessment was completed, and patient is at LOW risk for falls.Assessment completed on:1/8/2021    Health Habits and Functional and Cognitive Screening:  Functional & Cognitive Status 1/8/2021   Do  you have difficulty preparing food and eating? Yes   Do you have difficulty bathing yourself, getting dressed or grooming yourself? Yes   Do you have difficulty using the toilet? No   Do you have difficulty moving around from place to place? No   Do you have trouble with steps or getting out of a bed or a chair? No   Current Diet Other        Current Diet Comment soft diet   Dental Exam Up to date        Dental Exam Comment 2/2020   Eye Exam Not up to date        Eye Exam Comment 5 years ago   Exercise (times per week) 7 times per week   Current Exercises Include Aerobics   Do you need help using the phone?  Yes   Are you deaf or do you have serious difficulty hearing?  No   Do you need help with transportation? Yes   Do you need help shopping? Yes   Do you need help preparing meals?  Yes   Do you need help with housework?  Yes   Do you need help with laundry? Yes   Do you need help taking your medications? Yes   Do you need help managing money? Yes   Do you ever drive or ride in a car without wearing a seat belt? Yes   Have you felt unusual stress, anger or loneliness in the last month? No   Who do you live with? Child   If you need help, do you have trouble finding someone available to you? Yes   Have you been bothered in the last four weeks by sexual problems? (No Data)   Do you have difficulty concentrating, remembering or making decisions? Yes       Does the patient have evidence of cognitive impairment? Yes    Asprin use counseling:Does not need ASA (and currently is not on it)    Age-appropriate Screening Schedule:  Refer to the list below for future screening recommendations based on patient's age, sex and/or medical conditions. Orders for these recommended tests are listed in the plan section. The patient has been provided with a written plan.    Health Maintenance   Topic Date Due   • TDAP/TD VACCINES (1 - Tdap) 01/18/1954   • ZOSTER VACCINE (1 of 2) 01/18/1985   • DXA SCAN  08/08/2016   • INFLUENZA  VACCINE  08/01/2020   • LIPID PANEL  03/05/2021          The following portions of the patient's history were reviewed and updated as appropriate: allergies, current medications, past family history, past medical history, past social history, past surgical history and problem list.    Outpatient Medications Prior to Visit   Medication Sig Dispense Refill   • acetaminophen-codeine (TYLENOL #3) 300-30 MG per tablet Take 1 tablet by mouth Every 6 (Six) Hours As Needed for Moderate Pain . 12 tablet 0   • amitriptyline (ELAVIL) 25 MG tablet Take 1 tablet by mouth Every Night. 90 tablet 3   • atorvastatin (LIPITOR) 40 MG tablet Take 1 tablet by mouth Daily. 90 tablet 3   • donepezil (ARICEPT) 10 MG tablet Take 1 tablet by mouth Every Night. (Patient taking differently: Take 5 mg by mouth Every Night.) 30 tablet 0   • DULoxetine (CYMBALTA) 60 MG capsule Take 1 capsule by mouth Daily. 90 capsule 3   • esomeprazole (NEXIUM) 40 MG capsule Take 1 capsule by mouth Every Morning Before Breakfast. 90 capsule 3   • memantine (NAMENDA) 10 MG tablet Take 1 tablet by mouth 2 (Two) Times a Day. (Patient taking differently: Take 5 mg by mouth 2 (Two) Times a Day.) 180 tablet 3   • propranolol (INDERAL) 20 MG tablet Take 1 tablet by mouth 2 (Two) Times a Day. 180 tablet 3     No facility-administered medications prior to visit.        Patient Active Problem List   Diagnosis   • Blues   • Acid reflux   • Blood glucose elevated   • Hypercholesterolemia   • BP (high blood pressure)   • OP (osteoporosis)   • Allergic rhinitis   • Alzheimer's disease (CMS/[facility])   • Vitamin B 12 deficiency   • Cystitis   • Bronchitis   • Vitamin D toxicity   • Complicated UTI (urinary tract infection)   • Hypertension       Advanced Care Planning:  ACP discussion was held with the patient during this visit. Patient does not have an advance directive, declines further assistance.    Review of Systems   Constitutional: Negative.    Respiratory: Negative.     Cardiovascular: Negative.        Compared to one year ago, the patient feels her physical health is the same.  Compared to one year ago, the patient feels her mental health is worse.    Reviewed chart for potential of high risk medication in the elderly: yes  Reviewed chart for potential of harmful drug interactions in the elderly:yes    Objective       There were no vitals filed for this visit.; telephone visit    There is no height or weight on file to calculate BMI.; using BMI from 10/20/20 visit  Discussed the patient's BMI with her. The BMI is in the acceptable range.    Physical Exam; telephone visit          Assessment/Plan   Medicare Risks and Personalized Health Plan  CMS Preventative Services Quick Reference  Advance Directive Discussion  Dementia/Memory   Immunizations Discussed/Encouraged (specific immunizations; adacel Tdap, Pneumococcal 23 and Shingrix )  Osteoprorosis Risk    The above risks/problems have been discussed with the patient.  Pertinent information has been shared with the patient in the After Visit Summary.  Follow up plans and orders are seen below in the Assessment/Plan Section.    Diagnoses and all orders for this visit:    1. Medicare annual wellness visit, subsequent (Primary)      Follow Up:  Return for Dr. Barker as needed/as recommended.     Pt's son declined scheduling of Dexa scan at this time but will call when ready.     An After Visit Summary and PPPS were given to the patient.

## 2021-10-07 ENCOUNTER — HOSPITAL ENCOUNTER (OUTPATIENT)
Facility: HOSPITAL | Age: 86
Setting detail: OBSERVATION
Discharge: HOME | End: 2021-10-09
Attending: EMERGENCY MEDICINE | Admitting: EMERGENCY MEDICINE

## 2021-10-07 ENCOUNTER — APPOINTMENT (OUTPATIENT)
Dept: GENERAL RADIOLOGY | Facility: HOSPITAL | Age: 86
End: 2021-10-07

## 2021-10-07 DIAGNOSIS — R41.89 DECREASED RESPONSIVENESS: Primary | ICD-10-CM

## 2021-10-07 DIAGNOSIS — G30.9 ALZHEIMER'S DISEASE (HCC): ICD-10-CM

## 2021-10-07 DIAGNOSIS — R40.4 ALTERATION OF CONSCIOUSNESS: ICD-10-CM

## 2021-10-07 DIAGNOSIS — F02.80 ALZHEIMER'S DISEASE (HCC): ICD-10-CM

## 2021-10-07 LAB
ALBUMIN SERPL-MCNC: 4 G/DL (ref 3.5–5.2)
ALBUMIN/GLOB SERPL: 1.6 G/DL
ALP SERPL-CCNC: 71 U/L (ref 39–117)
ALT SERPL W P-5'-P-CCNC: 14 U/L (ref 1–33)
ANION GAP SERPL CALCULATED.3IONS-SCNC: 11.5 MMOL/L (ref 5–15)
AST SERPL-CCNC: 17 U/L (ref 1–32)
BACTERIA UR QL AUTO: ABNORMAL /HPF
BASOPHILS # BLD AUTO: 0.03 10*3/MM3 (ref 0–0.2)
BASOPHILS NFR BLD AUTO: 0.5 % (ref 0–1.5)
BILIRUB SERPL-MCNC: 0.4 MG/DL (ref 0–1.2)
BUN SERPL-MCNC: 17 MG/DL (ref 8–23)
BUN/CREAT SERPL: 28.3 (ref 7–25)
CALCIUM SPEC-SCNC: 9.1 MG/DL (ref 8.6–10.5)
CHLORIDE SERPL-SCNC: 107 MMOL/L (ref 98–107)
CO2 SERPL-SCNC: 25.5 MMOL/L (ref 22–29)
CREAT SERPL-MCNC: 0.6 MG/DL (ref 0.57–1)
DEPRECATED RDW RBC AUTO: 44.6 FL (ref 37–54)
EOSINOPHIL # BLD AUTO: 0.1 10*3/MM3 (ref 0–0.4)
EOSINOPHIL NFR BLD AUTO: 1.8 % (ref 0.3–6.2)
ERYTHROCYTE [DISTWIDTH] IN BLOOD BY AUTOMATED COUNT: 12.5 % (ref 12.3–15.4)
GFR SERPL CREATININE-BSD FRML MDRD: 95 ML/MIN/1.73
GLOBULIN UR ELPH-MCNC: 2.5 GM/DL
GLUCOSE SERPL-MCNC: 82 MG/DL (ref 65–99)
HCT VFR BLD AUTO: 38.6 % (ref 34–46.6)
HGB BLD-MCNC: 13.1 G/DL (ref 12–15.9)
HYALINE CASTS UR QL AUTO: ABNORMAL /LPF
IMM GRANULOCYTES # BLD AUTO: 0.08 10*3/MM3 (ref 0–0.05)
IMM GRANULOCYTES NFR BLD AUTO: 1.4 % (ref 0–0.5)
LYMPHOCYTES # BLD AUTO: 2.29 10*3/MM3 (ref 0.7–3.1)
LYMPHOCYTES NFR BLD AUTO: 40.6 % (ref 19.6–45.3)
MCH RBC QN AUTO: 32.8 PG (ref 26.6–33)
MCHC RBC AUTO-ENTMCNC: 33.9 G/DL (ref 31.5–35.7)
MCV RBC AUTO: 96.5 FL (ref 79–97)
MONOCYTES # BLD AUTO: 0.41 10*3/MM3 (ref 0.1–0.9)
MONOCYTES NFR BLD AUTO: 7.3 % (ref 5–12)
NEUTROPHILS NFR BLD AUTO: 2.73 10*3/MM3 (ref 1.7–7)
NEUTROPHILS NFR BLD AUTO: 48.4 % (ref 42.7–76)
NRBC BLD AUTO-RTO: 0 /100 WBC (ref 0–0.2)
PLATELET # BLD AUTO: 200 10*3/MM3 (ref 140–450)
PMV BLD AUTO: 11.5 FL (ref 6–12)
POTASSIUM SERPL-SCNC: 4 MMOL/L (ref 3.5–5.2)
PROT SERPL-MCNC: 6.5 G/DL (ref 6–8.5)
RBC # BLD AUTO: 4 10*6/MM3 (ref 3.77–5.28)
RBC # UR: ABNORMAL /HPF
REF LAB TEST METHOD: ABNORMAL
SARS-COV-2 RNA PNL SPEC NAA+PROBE: NOT DETECTED
SODIUM SERPL-SCNC: 144 MMOL/L (ref 136–145)
SQUAMOUS #/AREA URNS HPF: ABNORMAL /HPF
WBC # BLD AUTO: 5.64 10*3/MM3 (ref 3.4–10.8)
WBC UR QL AUTO: ABNORMAL /HPF

## 2021-10-07 PROCEDURE — 80053 COMPREHEN METABOLIC PANEL: CPT | Performed by: EMERGENCY MEDICINE

## 2021-10-07 PROCEDURE — G0378 HOSPITAL OBSERVATION PER HR: HCPCS

## 2021-10-07 PROCEDURE — 85025 COMPLETE CBC W/AUTO DIFF WBC: CPT | Performed by: EMERGENCY MEDICINE

## 2021-10-07 PROCEDURE — 81015 MICROSCOPIC EXAM OF URINE: CPT | Performed by: EMERGENCY MEDICINE

## 2021-10-07 PROCEDURE — 99285 EMERGENCY DEPT VISIT HI MDM: CPT

## 2021-10-07 PROCEDURE — 71045 X-RAY EXAM CHEST 1 VIEW: CPT

## 2021-10-07 PROCEDURE — 87635 SARS-COV-2 COVID-19 AMP PRB: CPT | Performed by: EMERGENCY MEDICINE

## 2021-10-07 RX ORDER — MEMANTINE HYDROCHLORIDE 5 MG/1
5 TABLET ORAL 2 TIMES DAILY
Status: DISCONTINUED | OUTPATIENT
Start: 2021-10-08 | End: 2021-10-08

## 2021-10-07 RX ORDER — PANTOPRAZOLE SODIUM 40 MG/1
40 TABLET, DELAYED RELEASE ORAL EVERY MORNING
Status: DISCONTINUED | OUTPATIENT
Start: 2021-10-08 | End: 2021-10-08

## 2021-10-07 RX ORDER — SODIUM CHLORIDE 9 MG/ML
75 INJECTION, SOLUTION INTRAVENOUS CONTINUOUS
Status: DISCONTINUED | OUTPATIENT
Start: 2021-10-08 | End: 2021-10-09

## 2021-10-07 RX ORDER — ATORVASTATIN CALCIUM 20 MG/1
40 TABLET, FILM COATED ORAL DAILY
Status: DISCONTINUED | OUTPATIENT
Start: 2021-10-08 | End: 2021-10-08

## 2021-10-07 RX ORDER — ONDANSETRON 2 MG/ML
4 INJECTION INTRAMUSCULAR; INTRAVENOUS EVERY 6 HOURS PRN
Status: DISCONTINUED | OUTPATIENT
Start: 2021-10-07 | End: 2021-10-09 | Stop reason: HOSPADM

## 2021-10-07 RX ORDER — DONEPEZIL HYDROCHLORIDE 5 MG/1
5 TABLET, FILM COATED ORAL NIGHTLY
Status: DISCONTINUED | OUTPATIENT
Start: 2021-10-08 | End: 2021-10-08

## 2021-10-07 RX ORDER — SODIUM CHLORIDE 0.9 % (FLUSH) 0.9 %
10 SYRINGE (ML) INJECTION AS NEEDED
Status: DISCONTINUED | OUTPATIENT
Start: 2021-10-07 | End: 2021-10-09 | Stop reason: HOSPADM

## 2021-10-07 RX ORDER — ACETAMINOPHEN 325 MG/1
650 TABLET ORAL EVERY 4 HOURS PRN
Status: DISCONTINUED | OUTPATIENT
Start: 2021-10-07 | End: 2021-10-09 | Stop reason: HOSPADM

## 2021-10-07 RX ORDER — ACETAMINOPHEN 160 MG/5ML
650 SOLUTION ORAL EVERY 4 HOURS PRN
Status: DISCONTINUED | OUTPATIENT
Start: 2021-10-07 | End: 2021-10-09 | Stop reason: HOSPADM

## 2021-10-07 RX ORDER — AMITRIPTYLINE HYDROCHLORIDE 25 MG/1
25 TABLET, FILM COATED ORAL NIGHTLY
Status: DISCONTINUED | OUTPATIENT
Start: 2021-10-08 | End: 2021-10-08

## 2021-10-07 RX ORDER — NITROGLYCERIN 0.4 MG/1
0.4 TABLET SUBLINGUAL
Status: DISCONTINUED | OUTPATIENT
Start: 2021-10-07 | End: 2021-10-09 | Stop reason: HOSPADM

## 2021-10-07 RX ORDER — ACETAMINOPHEN AND CODEINE PHOSPHATE 300; 30 MG/1; MG/1
1 TABLET ORAL EVERY 6 HOURS PRN
Status: DISCONTINUED | OUTPATIENT
Start: 2021-10-07 | End: 2021-10-09 | Stop reason: HOSPADM

## 2021-10-07 RX ORDER — ACETAMINOPHEN 650 MG/1
650 SUPPOSITORY RECTAL EVERY 4 HOURS PRN
Status: DISCONTINUED | OUTPATIENT
Start: 2021-10-07 | End: 2021-10-09 | Stop reason: HOSPADM

## 2021-10-07 RX ORDER — SODIUM CHLORIDE 0.9 % (FLUSH) 0.9 %
10 SYRINGE (ML) INJECTION EVERY 12 HOURS SCHEDULED
Status: DISCONTINUED | OUTPATIENT
Start: 2021-10-08 | End: 2021-10-09 | Stop reason: HOSPADM

## 2021-10-07 RX ORDER — PROPRANOLOL HYDROCHLORIDE 20 MG/1
20 TABLET ORAL 2 TIMES DAILY
Status: DISCONTINUED | OUTPATIENT
Start: 2021-10-08 | End: 2021-10-08

## 2021-10-07 RX ORDER — DULOXETIN HYDROCHLORIDE 60 MG/1
60 CAPSULE, DELAYED RELEASE ORAL DAILY
Status: DISCONTINUED | OUTPATIENT
Start: 2021-10-08 | End: 2021-10-08

## 2021-10-07 RX ADMIN — SODIUM CHLORIDE 500 ML: 9 INJECTION, SOLUTION INTRAVENOUS at 15:19

## 2021-10-07 NOTE — ED NOTES
Pt sleeping, no snoring or difficulty breathing noted at this time.      Annalee Faith, RN  10/07/21 8150

## 2021-10-07 NOTE — H&P
Internal medicine history and physical  INTERNAL MEDICINE   [facility]       Patient Identification:  Name: Mary Alice Sanders  Age: 86 y.o.  Sex: female  :  1935  MRN: [id number]                   Primary Care Physician: Gera Barker MD                               Date of admission:10/7/2021    Chief Complaint: Brought to the emergency room by EMS status post her caring son's request because of the change in mental status and repeated unresponsiveness.    History of Present Illness:   Source of information review of records and discussion with ER physician who has discussed patient history with patient's son in great detail.  Patient has nonspecific dementia and does not engage in she is aphasic.  Patient is a 86-year-old female with past medical history remarkable for end-stage dementia who does not indicate involved in conversation but able to ambulate and eat and has been cared for at home by her son was noted to have a usual day when she woke up.  After having a breakfast induced patient was left upstairs in her room and because of the unusual sounding heard he went up to check on her and found that she is slumped in her chair and snoring and on responsive.  Because of the worry of her having a stroke he called EMS.  According to the EMS report patient was noted to have oxygen saturation in low 80s and he was confused.  Nonrebreather mask was placed and patient was brought to the emergency room for further evaluation.  Extensive work-up in the emergency room did not reveal any acute pathology including unremarkable chest x-ray urinalysis and basic metabolic panel and CBC.  Initially patient was considered to be stable for discharge but because of her son's request patient is being admitted for observation to monitor for any neurological changes occur for which diagnosis can be made.  Patient was not noted to have any incontinence of bowel or bladder.    Past Medical History:  Past  Medical History:   Diagnosis Date   • Allergic rhinitis    • Chest pain    • GERD (gastroesophageal reflux disease)    • Hx of migraine headaches    • Hx: UTI (urinary tract infection)    • Hypertension    • Varicose veins      Past Surgical History:  Past Surgical History:   Procedure Laterality Date   •  SECTION        Home Meds:  (Not in a hospital admission)    Current Meds:     Current Facility-Administered Medications:   •  [COMPLETED] Insert peripheral IV, , , Once **AND** sodium chloride 0.9 % flush 10 mL, 10 mL, Intravenous, PRN, Jamar Townsend MD    Current Outpatient Medications:   •  acetaminophen-codeine (TYLENOL #3) 300-30 MG per tablet, Take 1 tablet by mouth Every 6 (Six) Hours As Needed for Moderate Pain ., Disp: 12 tablet, Rfl: 0  •  amitriptyline (ELAVIL) 25 MG tablet, Take 1 tablet by mouth Every Night., Disp: 90 tablet, Rfl: 3  •  atorvastatin (LIPITOR) 40 MG tablet, Take 1 tablet by mouth Daily., Disp: 90 tablet, Rfl: 3  •  donepezil (ARICEPT) 10 MG tablet, Take 1 tablet by mouth Every Night. (Patient taking differently: Take 5 mg by mouth Every Night.), Disp: 30 tablet, Rfl: 0  •  DULoxetine (CYMBALTA) 60 MG capsule, Take 1 capsule by mouth Daily., Disp: 90 capsule, Rfl: 3  •  esomeprazole (NEXIUM) 40 MG capsule, Take 1 capsule by mouth Every Morning Before Breakfast., Disp: 90 capsule, Rfl: 3  •  memantine (NAMENDA) 10 MG tablet, Take 1 tablet by mouth 2 (Two) Times a Day. (Patient taking differently: Take 5 mg by mouth 2 (Two) Times a Day.), Disp: 180 tablet, Rfl: 3  •  propranolol (INDERAL) 20 MG tablet, Take 1 tablet by mouth 2 (Two) Times a Day., Disp: 180 tablet, Rfl: 3  Allergies:  Allergies   Allergen Reactions   • Eggs Or Egg-Derived Products Other (See Comments)     uknown     Social History:   Social History     Tobacco Use   • Smoking status: Never Smoker   • Smokeless tobacco: Never Used   Substance Use Topics   • Alcohol use: No      Family History:  Family History    Problem Relation Age of Onset   • Coronary artery disease Mother    • Cancer Daughter           Review of Systems  See history of present illness and past medical history.  Could not be obtained from the patient because of dementia.      Vitals:   /81   Pulse 88   Resp 20   SpO2 96%   I/O: No intake or output data in the 24 hours ending 10/07/21 1723  Exam:  Patient is examined using the personal protective equipment as per guidelines from infection control for this particular patient as enacted.  Hand washing was performed before and after patient interaction.  General Appearance:   Awake comfortable  female who does not answer questions and does not appear to be in any acute distress.   Head:    Normocephalic, without obvious abnormality, atraumatic   Eyes:    PERRL, conjunctiva/corneas clear, EOM's intact, both eyes   Ears:    Normal external ear canals, both ears   Nose:   Nares normal, septum midline, mucosa normal, no drainage    or sinus tenderness   Throat:   Lips, tongue, gums normal; oral mucosa pink and moist   Neck:   Supple, symmetrical, trachea midline, no adenopathy;     thyroid:  no enlargement/tenderness/nodules; no carotid    bruit or JVD   Back:     Symmetric, no curvature, ROM normal, no CVA tenderness   Lungs:    Decreased breath sounds at the bases   Chest Wall:    No tenderness or deformity    Heart:    Regular rate and rhythm, S1 and S2 normal   Abdomen:    Soft nontender no organomegaly detected bowel sounds are positive.   Extremities:   Extremities normal, atraumatic, no cyanosis or edema   Pulses:   Pulses palpable in all extremities; symmetric all extremities   Skin:  Ecchymotic changes noted.   Neurologic:  Disoriented and confused does not engage nonverbal but moving upper and lower extremities symmetrically.       Data Review:      I reviewed the patient's new clinical results.  Results from last 7 days   Lab Units 10/07/21  1503   WBC 10*3/mm3 5.64   HEMOGLOBIN g/dL 13.1    PLATELETS 10*3/mm3 200     Results from last 7 days   Lab Units 10/07/21  1503   SODIUM mmol/L 144   POTASSIUM mmol/L 4.0   CHLORIDE mmol/L 107   CO2 mmol/L 25.5   BUN mg/dL 17   CREATININE mg/dL 0.60   CALCIUM mg/dL 9.1   GLUCOSE mg/dL 82     Microbiology Results (last 10 days)     Procedure Component Value - Date/Time    COVID PRE-OP / PRE-PROCEDURE SCREENING ORDER (NO ISOLATION) - Swab, Nasopharynx [[id number]]  (Normal) Collected: 10/07/21 1520    Lab Status: Final result Specimen: Swab from Nasopharynx Updated: 10/07/21 1648    Narrative:      The following orders were created for panel order COVID PRE-OP / PRE-PROCEDURE SCREENING ORDER (NO ISOLATION) - Swab, Nasopharynx.  Procedure                               Abnormality         Status                     ---------                               -----------         ------                     COVID-19,[facility] IN-HOUSE...[[id number]]  Normal              Final result                 Please view results for these tests on the individual orders.    COVID-19,[facility]-HOU[facility] CEPHEID/ERIN NP SWAB IN TRANSPORT MEDIA 8-12 HR TAT - Swab, Nasopharynx [[id number]]  (Normal) Collected: 10/07/21 1520    Lab Status: Final result Specimen: Swab from Nasopharynx Updated: 10/07/21 1648     COVID19 Not Detected    Narrative:      Fact sheet for providers: https://www.fda.gov/media/825241/download    Fact sheet for patients: https://www.fda.gov/media/231059/download    Test performed by PCR.          Assessment:  Active Hospital Problems    Diagnosis  POA   • **Decreased responsiveness [R41.89]  Yes   • Alzheimer's disease (HCC) [G30.9, F02.80]  Yes   • BP (high blood pressure) [I10]  Yes       Medical decision making:  Transient alteration of awareness/decreased responsiveness-etiology unclear and could range from seizure type activity versus CVA versus electrolyte imbalance.  Plan is to admit the patient provide her with seizure precautions and check for electrolyte imbalance  and dehydration and treat accordingly. Whether  or not patient would need any specific antiseizure medication would be deferred to neurology service.  Check CT scan of the head without contrast to rule out any acute intracranial pathology.  Provided fall precautions.  And monitor for sundowning.  Alzheimer's dementia-monitor for sundowning and fall precautions and aspiration precautions.  Hypertension-continue antihypertensive regimen and monitor.  Avoid hypotensive episodes.    Odilia Werner MD   10/7/2021  17:23 EDT  Much of this encounter note is an electronic transcription/translation of spoken language to printed text. The electronic translation of spoken language may permit erroneous, or at times, nonsensical words or phrases to be inadvertently transcribed; Although I have reviewed the note for such errors, some may still exist

## 2021-10-07 NOTE — ED PROVIDER NOTES
" EMERGENCY DEPARTMENT ENCOUNTER    Room Number:  22/22  Date of encounter:  10/7/2021  PCP: Gera Barker MD  Historian: Patient, son at bedside    I used full protective equipment while examining this patient.  This includes face mask, gloves and protective eyewear.  I washed my hands before entering the room and immediately upon leaving the room      HPI:  Chief Complaint: Altered mental status  A complete HPI/ROS/PMH/PSH/SH/FH are unobtainable due to: None    Context: Mary Alice Sanders is a 86 y.o. female who presents to the ED c/o altered mental status.  Patient has a history of fairly advanced dementia.  She woke up and had breakfast as per usual according to son.  She was upstairs lounging in a recliner when he heard an unusual noise when a check on her.  She was less responsive and had some sonorous breathing.  He was worried she might be having a \"stroke\" and called EMS.  Per EMS patient was confused and O2 saturations were not easy to a check and she was placed on nonrebreather mask.  Here in the ED patient shows signs of likely chronic dementia and will not answer questions nor follow commands.  According to son this is baseline per patient.      MEDICAL RECORD REVIEW  I reviewed prior medical records note the patient was hospitalized about 1 year ago with encephalopathy related to urinary tract infection.  Patient does have history of chronic and advanced Alzheimer's disease.    PAST MEDICAL HISTORY  Active Ambulatory Problems     Diagnosis Date Noted   • Blues 08/08/2016   • Acid reflux 08/08/2016   • Blood glucose elevated 08/08/2016   • Hypercholesterolemia 08/08/2016   • BP (high blood pressure) 08/08/2016   • OP (osteoporosis) 08/08/2016   • Allergic rhinitis 08/08/2016   • Alzheimer's disease (HCC) 04/03/2017   • Vitamin B 12 deficiency 01/15/2018   • Cystitis 01/15/2018   • Bronchitis 01/30/2018   • Vitamin D toxicity 09/21/2018   • Complicated UTI (urinary tract infection) 10/20/2020   • " Hypertension      Resolved Ambulatory Problems     Diagnosis Date Noted   • Memory loss, short term 2016   • Vitamin D deficiency 01/15/2018   • Vitamin D deficiency disease 2018     Past Medical History:   Diagnosis Date   • Chest pain    • GERD (gastroesophageal reflux disease)    • Hx of migraine headaches    • Hx: UTI (urinary tract infection)    • Varicose veins          PAST SURGICAL HISTORY  Past Surgical History:   Procedure Laterality Date   •  SECTION           FAMILY HISTORY  Family History   Problem Relation Age of Onset   • Coronary artery disease Mother    • Cancer Daughter          SOCIAL HISTORY  Social History     Socioeconomic History   • Marital status:      Spouse name: Not on file   • Number of children: Not on file   • Years of education: Not on file   • Highest education level: Not on file   Tobacco Use   • Smoking status: Never Smoker   • Smokeless tobacco: Never Used   Vaping Use   • Vaping Use: Never assessed   Substance and Sexual Activity   • Alcohol use: No   • Drug use: Defer   • Sexual activity: Defer         ALLERGIES  Eggs or egg-derived products       REVIEW OF SYSTEMS  Review of Systems   Unable to perform ROS: Dementia           PHYSICAL EXAM    I have reviewed the triage vital signs and nursing notes.    ED Triage Vitals [10/07/21 1415]   Temp Heart Rate Resp BP SpO2   -- 98 20 114/52 96 %      Temp src Heart Rate Source Patient Position BP Location FiO2 (%)   -- -- -- -- --       Physical Exam  GENERAL: Elderly female who is somewhat confused but in no obvious distress.  Triage vitals reviewed and are fairly unremarkable.  O2 saturations 96% on room air.  HENT: nares patent, atraumatic  EYES: no scleral icterus  CV: regular rhythm, regular rate-no murmur  RESPIRATORY: normal effort, clear to auscultation bilaterally  ABDOMEN: soft, nontender to palpation  MUSCULOSKELETAL: no deformity-no skin swelling or tenderness to palpation  NEURO: Strength,  sensation, and coordination are grossly intact.  Patient has significant confusion and is unable to answer questions or follow commands but this is baseline according to son/POA  SKIN: warm, dry-several small ecchymosis      LAB RESULTS  Recent Results (from the past 24 hour(s))   Comprehensive Metabolic Panel    Collection Time: 10/07/21  3:03 PM    Specimen: Blood   Result Value Ref Range    Glucose 82 65 - 99 mg/dL    BUN 17 8 - 23 mg/dL    Creatinine 0.60 0.57 - 1.00 mg/dL    Sodium 144 136 - 145 mmol/L    Potassium 4.0 3.5 - 5.2 mmol/L    Chloride 107 98 - 107 mmol/L    CO2 25.5 22.0 - 29.0 mmol/L    Calcium 9.1 8.6 - 10.5 mg/dL    Total Protein 6.5 6.0 - 8.5 g/dL    Albumin 4.00 3.50 - 5.20 g/dL    ALT (SGPT) 14 1 - 33 U/L    AST (SGOT) 17 1 - 32 U/L    Alkaline Phosphatase 71 39 - 117 U/L    Total Bilirubin 0.4 0.0 - 1.2 mg/dL    eGFR Non African Amer 95 >60 mL/min/1.73    Globulin 2.5 gm/dL    A/G Ratio 1.6 g/dL    BUN/Creatinine Ratio 28.3 (H) 7.0 - 25.0    Anion Gap 11.5 5.0 - 15.0 mmol/L   CBC Auto Differential    Collection Time: 10/07/21  3:03 PM    Specimen: Blood   Result Value Ref Range    WBC 5.64 3.40 - 10.80 10*3/mm3    RBC 4.00 3.77 - 5.28 10*6/mm3    Hemoglobin 13.1 12.0 - 15.9 g/dL    Hematocrit 38.6 34.0 - 46.6 %    MCV 96.5 79.0 - 97.0 fL    MCH 32.8 26.6 - 33.0 pg    MCHC 33.9 31.5 - 35.7 g/dL    RDW 12.5 12.3 - 15.4 %    RDW-SD 44.6 37.0 - 54.0 fl    MPV 11.5 6.0 - 12.0 fL    Platelets 200 140 - 450 10*3/mm3    Neutrophil % 48.4 42.7 - 76.0 %    Lymphocyte % 40.6 19.6 - 45.3 %    Monocyte % 7.3 5.0 - 12.0 %    Eosinophil % 1.8 0.3 - 6.2 %    Basophil % 0.5 0.0 - 1.5 %    Immature Grans % 1.4 (H) 0.0 - 0.5 %    Neutrophils, Absolute 2.73 1.70 - 7.00 10*3/mm3    Lymphocytes, Absolute 2.29 0.70 - 3.10 10*3/mm3    Monocytes, Absolute 0.41 0.10 - 0.90 10*3/mm3    Eosinophils, Absolute 0.10 0.00 - 0.40 10*3/mm3    Basophils, Absolute 0.03 0.00 - 0.20 10*3/mm3    Immature Grans, Absolute 0.08 (H)  0.00 - 0.05 10*3/mm3    nRBC 0.0 0.0 - 0.2 /100 WBC   Urinalysis, Microscopic Only - Urine, Catheter    Collection Time: 10/07/21  3:04 PM    Specimen: Urine, Catheter   Result Value Ref Range    RBC, UA 0-2 None Seen, 0-2 /HPF    WBC, UA None Seen None Seen, 0-2 /HPF    Bacteria, UA 2+ (A) None Seen /HPF    Squamous Epithelial Cells, UA 31-50 (A) None Seen, 0-2 /HPF    Hyaline Casts, UA None Seen None Seen /LPF    Methodology Manual Light Microscopy    COVID-19,[facility] IN-HOUSE CEPHEID/ERIN NP SWAB IN TRANSPORT MEDIA 8-12 HR TAT - Swab, Nasopharynx    Collection Time: 10/07/21  3:20 PM    Specimen: Nasopharynx; Swab   Result Value Ref Range    COVID19 Not Detected Not Detected - Ref. Range       Ordered the above labs and independently reviewed the results.      RADIOLOGY  XR Chest 1 View    Result Date: 10/7/2021  XR CHEST 1 VW-  HISTORY: Female who is 86 years-old,  altered mental status  TECHNIQUE: Frontal view of the chest  COMPARISON: 04/18/2014  FINDINGS: The heart size is normal. The aorta appears ectatic. Pulmonary vasculature is unremarkable. No focal pulmonary consolidation, pleural effusion, or pneumothorax. No acute osseous process.      No focal pulmonary consolidation. Ectatic appearing aorta. Follow-up as clinically indicated.  This report was finalized on 10/7/2021 3:34 PM by Dr. Darrick Morales M.D.        I ordered the above noted radiological studies. Reviewed by me and discussed with radiologist.  See dictation for official radiology interpretation.      PROCEDURES  Procedures      MEDICATIONS GIVEN IN ER    Medications   sodium chloride 0.9 % flush 10 mL (has no administration in time range)   sodium chloride 0.9 % bolus 500 mL (500 mL Intravenous New Bag 10/7/21 1519)         PROGRESS, DATA ANALYSIS, CONSULTS, AND MEDICAL DECISION MAKING    All labs have been independently reviewed by me.  All radiology studies have been reviewed by me and discussed with radiologist dictating the report.    EKG's independently viewed and interpreted by me.  Discussion below represents my analysis of pertinent findings related to patient's condition, differential diagnosis, treatment plan and final disposition.      ED Course as of Oct 07 1723   u Oct 07, 2021   1501 KJM-[id number]-vwon-old female with advanced dementia presents with decreased responsiveness earlier today.  Currently she seems to be back to neurologic baseline and vitals are reassuring.  Etiology is unclear.  Would consider encephalopathy is most likely cause.  Patient was hospitalized with UTI and this certainly could cause patient's symptoms.  Would also consider cardiac arrhythmia, pulmonary, renal, hepatic and other causes in the work-up of patient's decreased responsive spell.    [DB]   1502 Patient son can be reached at the following cell phone number:  [phone]    [DB]   1642 Chest x-ray discussed with radiologist shows no acute disease within the chest.    [DB]   1647 Labs reviewed are fairly unremarkable with normal white blood cell count and normal hemoglobin.  Chemistries are benign without significant electrolyte disturbance, no hepatic or renal failure.    [DB]   1648 Urinalysis drawn by catheter shows 2+ bacteria but no white blood cells and 31-50 squamous cells.I do not believe that this necessarily represents infection and will hold off on treatment at this time.    [DB]   1653 Discussed the results of testing with patient son and power of .  I see no clear answer for her unresponsive spell earlier today.  Perhaps this was syncope related to cardiac arrhythmia.  This certainly could be worsening of chronic dementia.  He is not interested in aggressive intervention such as pacemaker but would prefer if we were to keep her overnight to observe to see if she has any further spells.  If she is back at baseline tomorrow patient son states that he would feel comfortable taking her home.    [DB]   1721 I discussed evaluation of this  patient with Dr. Werner who will admit on behalf of [facility].    [DB]      ED Course User Index  [DB] Jamar Townsend MD       AS OF 17:23 EDT VITALS:    BP - 130/81  HR - 88  TEMP -    O2 SATS - 96%      DIAGNOSIS  Final diagnoses:   Decreased responsiveness   Alzheimer's disease (CMS/HCC)   Alteration of consciousness         DISPOSITION  Admission       Jamar Townsend MD  10/07/21 0338

## 2021-10-07 NOTE — ED NOTES
Ems reports patient was 81% on room air on arrival, patient is 96% on room air here.  Suspected reading error or airway obstruction while slumped over in chair.  Patient is still not following commands and groaning and restless in bed.      Annalee Faith, RN  10/07/21 3985

## 2021-10-07 NOTE — ED NOTES
Son at bedside, at 930 he states he got her up and she was normal.  She drank a boost and then 15 minutes later found her breathing and snoring loudly.       Annalee Faith RN  10/07/21 6340

## 2021-10-08 ENCOUNTER — APPOINTMENT (OUTPATIENT)
Dept: CT IMAGING | Facility: HOSPITAL | Age: 86
End: 2021-10-08

## 2021-10-08 ENCOUNTER — HOME HEALTH ADMISSION (OUTPATIENT)
Dept: HOME HEALTH SERVICES | Facility: HOME HEALTHCARE | Age: 86
End: 2021-10-08

## 2021-10-08 PROBLEM — R55 VASOVAGAL EPISODE: Status: ACTIVE | Noted: 2021-10-08

## 2021-10-08 PROBLEM — R54 AGE-RELATED PHYSICAL DEBILITY: Status: ACTIVE | Noted: 2021-10-08

## 2021-10-08 PROBLEM — E46 MALNUTRITION (HCC): Status: ACTIVE | Noted: 2021-10-08

## 2021-10-08 PROBLEM — Z66 DNR (DO NOT RESUSCITATE): Status: ACTIVE | Noted: 2021-10-08

## 2021-10-08 LAB
ALBUMIN SERPL-MCNC: 3.8 G/DL (ref 3.5–5.2)
ALBUMIN/GLOB SERPL: 1.8 G/DL
ALP SERPL-CCNC: 67 U/L (ref 39–117)
ALT SERPL W P-5'-P-CCNC: 10 U/L (ref 1–33)
ANION GAP SERPL CALCULATED.3IONS-SCNC: 9.8 MMOL/L (ref 5–15)
AST SERPL-CCNC: 16 U/L (ref 1–32)
BASOPHILS # BLD AUTO: 0.02 10*3/MM3 (ref 0–0.2)
BASOPHILS NFR BLD AUTO: 0.3 % (ref 0–1.5)
BILIRUB SERPL-MCNC: 0.7 MG/DL (ref 0–1.2)
BUN SERPL-MCNC: 13 MG/DL (ref 8–23)
BUN/CREAT SERPL: 27.1 (ref 7–25)
CALCIUM SPEC-SCNC: 8.9 MG/DL (ref 8.6–10.5)
CHLORIDE SERPL-SCNC: 107 MMOL/L (ref 98–107)
CO2 SERPL-SCNC: 22.2 MMOL/L (ref 22–29)
CREAT SERPL-MCNC: 0.48 MG/DL (ref 0.57–1)
D-LACTATE SERPL-SCNC: 1.2 MMOL/L (ref 0.5–2)
DEPRECATED RDW RBC AUTO: 41.7 FL (ref 37–54)
EOSINOPHIL # BLD AUTO: 0.01 10*3/MM3 (ref 0–0.4)
EOSINOPHIL NFR BLD AUTO: 0.2 % (ref 0.3–6.2)
ERYTHROCYTE [DISTWIDTH] IN BLOOD BY AUTOMATED COUNT: 12 % (ref 12.3–15.4)
GFR SERPL CREATININE-BSD FRML MDRD: 123 ML/MIN/1.73
GLOBULIN UR ELPH-MCNC: 2.1 GM/DL
GLUCOSE SERPL-MCNC: 103 MG/DL (ref 65–99)
HCT VFR BLD AUTO: 35.3 % (ref 34–46.6)
HGB BLD-MCNC: 12 G/DL (ref 12–15.9)
IMM GRANULOCYTES # BLD AUTO: 0.02 10*3/MM3 (ref 0–0.05)
IMM GRANULOCYTES NFR BLD AUTO: 0.3 % (ref 0–0.5)
LYMPHOCYTES # BLD AUTO: 1.26 10*3/MM3 (ref 0.7–3.1)
LYMPHOCYTES NFR BLD AUTO: 21.8 % (ref 19.6–45.3)
MCH RBC QN AUTO: 32.2 PG (ref 26.6–33)
MCHC RBC AUTO-ENTMCNC: 34 G/DL (ref 31.5–35.7)
MCV RBC AUTO: 94.6 FL (ref 79–97)
MONOCYTES # BLD AUTO: 0.48 10*3/MM3 (ref 0.1–0.9)
MONOCYTES NFR BLD AUTO: 8.3 % (ref 5–12)
NEUTROPHILS NFR BLD AUTO: 3.99 10*3/MM3 (ref 1.7–7)
NEUTROPHILS NFR BLD AUTO: 69.1 % (ref 42.7–76)
NRBC BLD AUTO-RTO: 0 /100 WBC (ref 0–0.2)
PLATELET # BLD AUTO: 177 10*3/MM3 (ref 140–450)
PMV BLD AUTO: 11.6 FL (ref 6–12)
POTASSIUM SERPL-SCNC: 3.9 MMOL/L (ref 3.5–5.2)
PROT SERPL-MCNC: 5.9 G/DL (ref 6–8.5)
RBC # BLD AUTO: 3.73 10*6/MM3 (ref 3.77–5.28)
SODIUM SERPL-SCNC: 139 MMOL/L (ref 136–145)
WBC # BLD AUTO: 5.78 10*3/MM3 (ref 3.4–10.8)

## 2021-10-08 PROCEDURE — G0378 HOSPITAL OBSERVATION PER HR: HCPCS

## 2021-10-08 PROCEDURE — 80053 COMPREHEN METABOLIC PANEL: CPT | Performed by: INTERNAL MEDICINE

## 2021-10-08 PROCEDURE — 83605 ASSAY OF LACTIC ACID: CPT | Performed by: INTERNAL MEDICINE

## 2021-10-08 PROCEDURE — 85025 COMPLETE CBC W/AUTO DIFF WBC: CPT | Performed by: INTERNAL MEDICINE

## 2021-10-08 PROCEDURE — 70450 CT HEAD/BRAIN W/O DYE: CPT

## 2021-10-08 RX ADMIN — SODIUM CHLORIDE, PRESERVATIVE FREE 10 ML: 5 INJECTION INTRAVENOUS at 20:15

## 2021-10-08 RX ADMIN — SODIUM CHLORIDE 75 ML/HR: 9 INJECTION, SOLUTION INTRAVENOUS at 00:59

## 2021-10-08 RX ADMIN — SODIUM CHLORIDE, PRESERVATIVE FREE 10 ML: 5 INJECTION INTRAVENOUS at 09:36

## 2021-10-08 NOTE — PROGRESS NOTES
Name: Mary Alice Sanders ADMIT: 10/7/2021   : 1935  PCP: Gera Barker MD    MRN: [id number] LOS: 0 days   AGE/SEX: 86 y.o. female  ROOM: [facility]     Subjective   Subjective   Patient resting comfortably in bed.  She awakens to touch, withdraws from pain, does not follow commands, does not speak.  I called and spoke to her son to get her baseline.  She is essentially on a liquid diet, drinks 3 boost per day.  Does not eat solid food anymore.  She still is able to walk around with supervision and dress herself.  Needs full assistance for bathing and transferring.  She does little talking at home.  Son states that she recognizes him, but not his brother who lives out of town and rarely sees her.  Based on this she appears to be at baseline    Review of Systems  Not able to obtain due to patient's mental status     Objective   Objective   Vital Signs  Temp:  [97.5 °F (36.4 °C)-98.6 °F (37 °C)] 98.6 °F (37 °C)  Heart Rate:  [] 86  Resp:  [18] 18  BP: ()/(67-96) 118/67  SpO2:  [93 %-99 %] 95 %  on   ;   Device (Oxygen Therapy): room air  Body mass index is 19.74 kg/m².  Physical Exam  Constitutional:       General: She is not in acute distress.     Appearance: She is not diaphoretic.      Comments: Frail, chronically ill-appearing.  Temporal and supraclavicular wasting   HENT:      Mouth/Throat:      Mouth: Mucous membranes are moist.   Eyes:      General: No scleral icterus.  Cardiovascular:      Rate and Rhythm: Normal rate and regular rhythm.      Pulses: Normal pulses.      Heart sounds: No murmur heard.   No gallop.    Pulmonary:      Effort: Pulmonary effort is normal. No respiratory distress.      Breath sounds: No wheezing or rhonchi.   Abdominal:      Palpations: Abdomen is soft.      Tenderness: There is no abdominal tenderness. There is no guarding.   Skin:     General: Skin is warm and dry.   Neurological:      Comments: Tracks with eyes, does not follow commands.  Moves all extremities  spontaneously         Results Review     I reviewed the patient's new clinical results.  Results from last 7 days   Lab Units 10/08/21  0905 10/07/21  1503   WBC 10*3/mm3 5.78 5.64   HEMOGLOBIN g/dL 12.0 13.1   PLATELETS 10*3/mm3 177 200     Results from last 7 days   Lab Units 10/08/21  0905 10/07/21  1503   SODIUM mmol/L 139 144   POTASSIUM mmol/L 3.9 4.0   CHLORIDE mmol/L 107 107   CO2 mmol/L 22.2 25.5   BUN mg/dL 13 17   CREATININE mg/dL 0.48* 0.60   GLUCOSE mg/dL 103* 82   Estimated Creatinine Clearance: 42.9 mL/min (A) (by C-G formula based on SCr of 0.48 mg/dL (L)).  Results from last 7 days   Lab Units 10/08/21  0905 10/07/21  1503   ALBUMIN g/dL 3.80 4.00   BILIRUBIN mg/dL 0.7 0.4   ALK PHOS U/L 67 71   AST (SGOT) U/L 16 17   ALT (SGPT) U/L 10 14     Results from last 7 days   Lab Units 10/08/21  0905 10/07/21  1503   CALCIUM mg/dL 8.9 9.1   ALBUMIN g/dL 3.80 4.00     Results from last 7 days   Lab Units 10/08/21  0905   LACTATE mmol/L 1.2     COVID19   Date Value Ref Range Status   10/07/2021 Not Detected Not Detected - Ref. Range Final     SARS-CoV-2 PCR   Date Value Ref Range Status   10/20/2020 Not Detected Not Detected Final     Comment:     Nucleic acid specific to SARS-CoV-2 (COVID-19) virus was not detected inthis sample by the TaqPath (TM) COVID-19 Combo Kit.SARS-CoV-2 (COVID-19) nucleic acid testing performed using [facility] Aptima (R) SARS-CoV-2 Assay or Histros TaqPath   (TM)COVID-19 Combo Kit as indicated above under Emergency Use Authorization (EUA) from the FDA. Aptima (R) and TaqPath (TM) test performancecharacteristics verified by [facility] in accordance with the FDAs Guidance Document (Policy for Diagnostic   Tests for Coronavirus Disease-2019during the Public Health Emergency) issued on March 16, 2020. The laboratory is regulated under CLIA as qualified to perform high-complexity testingUnless otherwise noted, all testing was performed at [facility],   CLIA No.  [id number], [place] State Licensee No. [id number]     No results found for: HGBA1C, POCGLU    CT Head Without Contrast  CT HEAD WITHOUT CONTRAST     HISTORY: Altered mental status, dementia, confusion, agitation.     COMPARISON: None.     FINDINGS:     The study is limited by patient motion. There is moderate diffuse  atrophy with secondary enlargement of the lateral ventricles. Confluent  areas of decreased attenuation are appreciated involving the white  matter of the cerebral hemispheres bilaterally, nonspecific, but  consistent with extensive small vessel ischemic disease. There is no  evidence of hemorrhage, acute infarction or of abnormal extra-axial  fluid. Further evaluation could be performed with MRI examination of the  brain as indicated.           Radiation dose reduction techniques were utilized, including automated  exposure control and exposure modulation based on body size.     This report was finalized on 10/8/2021 8:35 AM by Dr. Benson Emanuel M.D.       I reviewed the patient's daily medications.  Scheduled Medications  sodium chloride, 10 mL, Intravenous, Q12H    Infusions  sodium chloride, 75 mL/hr, Last Rate: 75 mL/hr (10/08/21 0937)    Diet  NPO Diet       Assessment/Plan     Active Hospital Problems    Diagnosis  POA   • **Vasovagal episode [R55]  Yes   • DNR (do not resuscitate) [Z66]  Yes   • Malnutrition (HCC) [E46]  Yes   • Age-related physical debility [R54]  Yes   • Decreased responsiveness [R41.89]  Yes   • Alzheimer's disease (HCC) [G30.9, F02.80]  Yes   • BP (high blood pressure) [I10]  Yes      Resolved Hospital Problems   No resolved problems to display.       86 y.o. female history of advanced mention admitted with Vasovagal episode.  Her son describes an episode that lasted for several minutes during which she was unresponsive with her head slumped forward.  She came to quickly and returned to baseline by the time she arrived to the hospital.  I spoke to her son to get her baseline.    "She is still able to walk around with supervision and dress herself.  Needs full assistance for bathing and transferring. She is on a liquid diet, drinks 3 boost per day.  Does not eat solid food anymore.  She does little talking at home.  Son states that she recognizes him, but not his brother who lives out of town and rarely sees her.  Based on this she appears to be at baseline.  We discussed her prognosis in light of her advanced dementia.  Explained that eating less and talking less portends a poor prognosis. He has a good understanding of her condition, and wants her to \"live out her life\" at home.  Would like her to be a DNR.  He also would like to discuss making an advanced directive.    Altered mental status  -Unclear etiology.  She is not febrile, no leukocytosis, clean urinalysis, negative chest x-ray.  Low concern for infection.  CT head showing extensive small vessel ischemic disease but no bleeding or infarct.  Low concern for stroke based on the timing of her symptoms.  No need for MRI at this time  -Possibly vasovagal event  -Son plans to take her home in the morning  -Delirium precautions    · SCDs for DVT prophylaxis.  · DNR.  · Discussed with family.  · Anticipate discharge home tomorrow.      Kaye Hollingsworth DO  [facility]  10/08/21  14:44 EDT    Patient was wearing facemask when I entered the room and throughout our encounter.  I wore protective equipment throughout this patient encounter including a face mask, gloves and protective eyewear.  Hand hygiene was performed before donning protective equipment and after removal when leaving the room.        "

## 2021-10-08 NOTE — DISCHARGE PLACEMENT REQUEST
"Nohemy Sanders (86 y.o. Female)     Date of Birth Social Security Number Address Home Phone MRN    1935  [address] [place] [address] [phone] [id number]    Christian Marital Status          Other        Admission Date Admission Type Admitting Provider Attending Provider Department, Room/Bed    10/7/21 Emergency Odilia Werner MD George, Katherine, DO [address], P591/1    Discharge Date Discharge Disposition Discharge Destination                       Attending Provider: Kaye Hollingsworth DO    Allergies: Eggs Or Egg-derived Products    Isolation: None   Infection: None   Code Status: No CPR    Ht: 165.1 cm (65\")   Wt: 53.8 kg (118 lb 9.7 oz)    Admission Cmt: None   Principal Problem: Vasovagal episode [R55]                 Active Insurance as of 10/7/2021     Primary Coverage     Payor Plan Insurance Group Employer/Plan Group    MEDICARE MEDICARE A & B      Payor Plan Address Payor Plan Phone Number Payor Plan Fax Number Effective Dates    [address] [phone]  1/1/2000 - None Entered    [facility] [id number]       Subscriber Name Subscriber Birth Date Member ID       NOHEMY SANDERS 1935 [id number]           Secondary Coverage     Payor Plan Insurance Group Employer/Plan Group    ANTH BLUE CROSS [facility] SUPP [id number]     Payor Plan Address Payor Plan Phone Number Payor Plan Fax Number Effective Dates    [address]   12/1/2016 - None Entered    [facility] [id number]       Subscriber Name Subscriber Birth Date Member ID       NOHEMY SANDERS 1935 [id number]                 Emergency Contacts      (Rel.) Home Phone Work Phone Mobile Phone    Mann Swift (Son) [phone] -- [phone]              "

## 2021-10-08 NOTE — PROGRESS NOTES
Discharge Planning Assessment  [facility]     Patient Name: Mary Alice Sanders  MRN: [id number]  Today's Date: 10/8/2021    Admit Date: 10/7/2021    Discharge Needs Assessment     Row Name 10/08/21 1543       Living Environment    Lives With  child(sarika), adult    Name(s) of Who Lives With Patient  Son, Mann Swift, [phone]    Current Living Arrangements  home/apartment/condo    Primary Care Provided by  self    Provides Primary Care For  no one    Family Caregiver if Needed  child(sarika), adult    Quality of Family Relationships  helpful;involved;supportive    Able to Return to Prior Arrangements  yes       Resource/Environmental Concerns    Resource/Environmental Concerns  none       Transition Planning    Patient/Family Anticipates Transition to  home with family;home with help/services    Patient/Family Anticipated Services at Transition  home health care    Transportation Anticipated  family or friend will provide       Discharge Needs Assessment    Equipment Currently Used at Home  walker, rolling;wheelchair    Concerns to be Addressed  discharge planning    Outpatient/Agency/Support Group Needs  homecare agency    Discharge Facility/Level of Care Needs  home with home health    Provided Post Acute Provider List?  Yes    Post Acute Provider List  Home Health    Discharge Coordination/Progress  Referral to [facility]        Discharge Plan     Row Name 10/08/21 1545       Plan    Plan  Home with son, referral to [facility]    Patient/Family in Agreement with Plan  yes    Plan Comments  CCP met with pt and son at bedside to verify information and discuss d/c planning. Pt resides with her son in a multi level home and is independent for mobility at home but uses walker or transport w/c outside home. Pt has had [facility] in the past and son agreeable to new referral (pending). Pt has no SNF history. Pt uses [facility] [place]. CCP to follow for HH eval and additional needs. Emilie Ruiz LCSW        Continued Care and  Services - Admitted Since 10/7/2021     Home Medical Care     Service Provider Request Status Selected Services Address Phone Fax Patient Preferred     [facility]  Pending - Request Sent N/A [address] [address] [phone] --              Expected Discharge Date and Time     Expected Discharge Date Expected Discharge Time    Oct 9, 2021         Demographic Summary     Row Name 10/08/21 1541       General Information    Admission Type  observation    Arrived From  home    Required Notices Provided  Observation Status Notice    Referral Source  admission list    Reason for Consult  discharge planning    Preferred Language  English        Functional Status     Row Name 10/08/21 1542       Functional Status    Usual Activity Tolerance  moderate    Current Activity Tolerance  moderate       Functional Status, IADL    Medications  assistive equipment and person    Meal Preparation  assistive equipment and person    Housekeeping  assistive equipment and person    Laundry  assistive equipment and person    Shopping  assistive equipment and person       Mental Status Summary    Recent Changes in Mental Status/Cognitive Functioning  unable to assess        Psychosocial    No documentation.       Abuse/Neglect    No documentation.       Legal    No documentation.       Substance Abuse    No documentation.       Patient Forms    No documentation.           Patsy Ruiz LCSW

## 2021-10-08 NOTE — PLAN OF CARE
"Goal Outcome Evaluation:              Outcome Summary: Patient has slept most of this shift.  Patient will say things like \"oh god.\"  Patient will not answer questions or follow commands.  Attempted to have patient drink a boost.  Patient would not take a drink from the straw. VSS, IV fluids currently infusing.  Will CTM.  "

## 2021-10-08 NOTE — PLAN OF CARE
Goal Outcome Evaluation:  Plan of Care Reviewed With: patient        Progress: no change  Outcome Summary: New admit this shift for decreased responsiveness. Upon arrival patient is alert but will not answer any questions or cooperate with assessment/care. Per son patient is mostly non-verbal at baseline. Rested comfortably overnight. IVF infusing per order. Incontinence care and turns provided. VSS. Possible dc back home today.

## 2021-10-09 ENCOUNTER — READMISSION MANAGEMENT (OUTPATIENT)
Dept: CALL CENTER | Facility: HOSPITAL | Age: 86
End: 2021-10-09

## 2021-10-09 VITALS
TEMPERATURE: 98 F | HEART RATE: 89 BPM | HEIGHT: 65 IN | OXYGEN SATURATION: 95 % | RESPIRATION RATE: 16 BRPM | DIASTOLIC BLOOD PRESSURE: 87 MMHG | BODY MASS INDEX: 19.76 KG/M2 | WEIGHT: 118.61 LBS | SYSTOLIC BLOOD PRESSURE: 114 MMHG

## 2021-10-09 PROCEDURE — G0378 HOSPITAL OBSERVATION PER HR: HCPCS

## 2021-10-09 PROCEDURE — 92610 EVALUATE SWALLOWING FUNCTION: CPT

## 2021-10-09 RX ADMIN — SODIUM CHLORIDE, PRESERVATIVE FREE 10 ML: 5 INJECTION INTRAVENOUS at 09:43

## 2021-10-09 NOTE — PLAN OF CARE
Problem: Adult Inpatient Plan of Care  Goal: Plan of Care Review  Outcome: Ongoing, Progressing  Flowsheets (Taken 10/9/2021 0839)  Outcome Summary: Bedside swallow eval   Goal Outcome Evaluation:              Outcome Summary: Bedside swallow evaluation performrd this date. Pt refused to drink from a cup, exhibited throat clearing and audible swallow with straw and teaspoon sips of thin liquids.  Pt tolerated nectar thick liquids via spoom without difficulty. Increased time noted with puree consistency and pt chewed it. RN reported that pt drank a boost for or with meals.  SLP recommends this be continued. Medication crushed in applesauce/pudding,  Upright at 90 defrees foe all meals.  Speech tx will reassess at bedside.

## 2021-10-09 NOTE — PLAN OF CARE
Problem: Adult Inpatient Plan of Care  Goal: Plan of Care Review  Outcome: Ongoing, Progressing  Flowsheets (Taken 10/9/2021 0642)  Plan of Care Reviewed With: patient  Goal: Patient-Specific Goal (Individualized)  Outcome: Ongoing, Progressing  Goal: Absence of Hospital-Acquired Illness or Injury  Outcome: Ongoing, Progressing  Intervention: Identify and Manage Fall Risk  Recent Flowsheet Documentation  Taken 10/9/2021 0600 by Tia Manuel RN  Safety Promotion/Fall Prevention: safety round/check completed  Taken 10/9/2021 0400 by Tia Manuel RN  Safety Promotion/Fall Prevention: safety round/check completed  Taken 10/9/2021 0200 by Tia Manuel RN  Safety Promotion/Fall Prevention: safety round/check completed  Taken 10/9/2021 0000 by Tia Manuel RN  Safety Promotion/Fall Prevention: safety round/check completed  Taken 10/8/2021 2200 by Tia Manuel RN  Safety Promotion/Fall Prevention: safety round/check completed  Taken 10/8/2021 2000 by Tia Manuel RN  Safety Promotion/Fall Prevention: safety round/check completed  Intervention: Prevent Skin Injury  Recent Flowsheet Documentation  Taken 10/8/2021 2000 by Tia Manuel RN  Body Position: supine  Skin Protection: adhesive use limited  Intervention: Prevent and Manage VTE (venous thromboembolism) Risk  Recent Flowsheet Documentation  Taken 10/8/2021 2000 by Tia Manuel RN  VTE Prevention/Management:   bilateral   dorsiflexion/plantar flexion performed   sequential compression devices on  Intervention: Prevent Infection  Recent Flowsheet Documentation  Taken 10/8/2021 2000 by Tia Manuel RN  Infection Prevention: single patient room provided  Goal: Optimal Comfort and Wellbeing  Outcome: Ongoing, Progressing  Intervention: Provide Person-Centered Care  Recent Flowsheet Documentation  Taken 10/8/2021 2000 by Tia Manuel RN  Trust Relationship/Rapport: care explained  Goal: Readiness for Transition of Care  Outcome: Ongoing,  Progressing     Problem: Skin Injury Risk Increased  Goal: Skin Health and Integrity  Outcome: Ongoing, Progressing  Intervention: Optimize Skin Protection  Recent Flowsheet Documentation  Taken 10/8/2021 2000 by Tia Manuel RN  Pressure Reduction Techniques: frequent weight shift encouraged  Head of Bed (HOB): HOB at 30 degrees  Pressure Reduction Devices: alternating pressure pump (ADD)  Skin Protection: adhesive use limited     Problem: Fall Injury Risk  Goal: Absence of Fall and Fall-Related Injury  Outcome: Ongoing, Progressing  Intervention: Identify and Manage Contributors to Fall Injury Risk  Recent Flowsheet Documentation  Taken 10/9/2021 0600 by Tia Manuel RN  Medication Review/Management: medications reviewed  Taken 10/9/2021 0400 by Tia Manuel RN  Medication Review/Management: medications reviewed  Taken 10/9/2021 0200 by Tia Manuel RN  Medication Review/Management: medications reviewed  Taken 10/9/2021 0000 by Tia Manuel RN  Medication Review/Management: medications reviewed  Taken 10/8/2021 2200 by Tia Manuel RN  Medication Review/Management: medications reviewed  Taken 10/8/2021 2000 by Tia Manuel RN  Medication Review/Management: medications reviewed  Intervention: Promote Injury-Free Environment  Recent Flowsheet Documentation  Taken 10/9/2021 0600 by Tia Manuel RN  Safety Promotion/Fall Prevention: safety round/check completed  Taken 10/9/2021 0400 by Tia Manuel RN  Safety Promotion/Fall Prevention: safety round/check completed  Taken 10/9/2021 0200 by Tia Manuel RN  Safety Promotion/Fall Prevention: safety round/check completed  Taken 10/9/2021 0000 by Tia Manuel RN  Safety Promotion/Fall Prevention: safety round/check completed  Taken 10/8/2021 2200 by Tia Manuel RN  Safety Promotion/Fall Prevention: safety round/check completed  Taken 10/8/2021 2000 by Tia Manuel RN  Safety Promotion/Fall Prevention: safety round/check completed    Goal Outcome Evaluation:  Plan of Care Reviewed With: patient

## 2021-10-09 NOTE — THERAPY EVALUATION
Acute Care - Speech Langu  age Pathology   Swallow Initial Evaluation [facility]     Patient Name: Mary Alice Sanders  : 1935  MRN: [id number]  Today's Date: 10/9/2021               Admit Date: 10/7/2021    Visit Dx:     ICD-10-CM ICD-9-CM   1. Decreased responsiveness  R41.89 780.09   2. Alzheimer's disease (CMS/HCC)  G30.9 331.0    F02.80    3. Alteration of consciousness  R40.4 780.09     Patient Active Problem List   Diagnosis   • Blues   • Acid reflux   • Blood glucose elevated   • Hypercholesterolemia   • BP (high blood pressure)   • OP (osteoporosis)   • Allergic rhinitis   • Alzheimer's disease (HCC)   • Vitamin B 12 deficiency   • Cystitis   • Bronchitis   • Vitamin D toxicity   • Complicated UTI (urinary tract infection)   • Hypertension   • Decreased responsiveness   • DNR (do not resuscitate)   • Vasovagal episode   • Malnutrition (HCC)   • Age-related physical debility     Past Medical History:   Diagnosis Date   • Allergic rhinitis    • Chest pain    • GERD (gastroesophageal reflux disease)    • Hx of migraine headaches    • Hx: UTI (urinary tract infection)    • Hypertension    • Varicose veins      Past Surgical History:   Procedure Laterality Date   •  SECTION         SLP Recommendation and Plan  SLP Swallowing Diagnosis: mild-moderate, suspected pharyngeal dysphagia, oral dysphagia (10/09/21 0800)  SLP Diet Recommendation: puree, nectar thick liquids (10/09/21 0800)  Recommended Precautions and Strategies: small bites of food and sips of liquid, liquid via spoon only (10/09/21 0800)  SLP Rec. for Method of Medication Administration: meds crushed, with pudding or applesauce (10/09/21 0800)     Monitor for Signs of Aspiration: yes, notify SLP if any concerns (10/09/21 0800)  Recommended Diagnostics: reassess via clinical swallow evaluation (10/09/21 0800)  Swallow Criteria for Skilled Therapeutic Interventions Met: demonstrates skilled criteria (10/09/21 0800)  Anticipated Discharge  Disposition (SLP): home with assist (10/09/21 0800)  Rehab Potential/Prognosis, Swallowing: good, to achieve stated therapy goals (10/09/21 0800)  Therapy Frequency (Swallow): PRN (10/09/21 0800)  Predicted Duration Therapy Intervention (Days): until discharge (10/09/21 0800)                         Outcome Summary: Bedside swallow eval  Patient was not wearing a face mask during this therapy encounter. Therapist used appropriate personal protective equipment including mask, eye protection and gloves.  Mask used was N95/duckbill. Appropriate PPE was worn during the entire therapy session. Hand hygiene was completed before and after therapy session. Patient is not in enhanced droplet precautions.               SWALLOW EVALUATION (last 72 hours)     SLP Adult Swallow Evaluation     Row Name 10/09/21 0800                   Rehab Evaluation    Document Type evaluation  -LS        Patient Observations alert  -LS        Care Plan Review care plan/treatment goals reviewed  -LS        Patient Effort good  -LS        Symptoms Noted During/After Treatment none  -LS                  General Information    Patient Profile Reviewed yes  -LS        Pertinent History Of Current Problem 86 yr old lives with her son., Son reports pt got up normally on the morning of 10/7.  She has a boost for breakfast. 15 minutes later she was found b reathing and dnoring loudly.  PMH: advanced dementia.  -LS        Current Method of Nutrition NPO  -LS        Precautions/Limitations, Vision WFL; for purposes of eval  -LS        Precautions/Limitations, Hearing WFL; for purposes of eval  -LS        Prior Level of Function-Communication cognitive-linguistic impairment  -LS        Prior Level of Function-Swallowing no diet consistency restrictions  -LS        Plans/Goals Discussed with other (see comments)  RN  -LS        Barriers to Rehab cognitive status  -LS                  Oral Motor Structure and Function    Dentition Assessment edentulous,  dentures not available  -LS        Secretion Management WNL/WFL  -LS        Mucosal Quality moist, healthy  -LS                  Oral Musculature and Cranial Nerve Assessment    Oral Motor General Assessment generalized oral motor weakness  -LS                  General Eating/Swallowing Observations    Respiratory Support Currently in Use room air  -LS        Eating/Swallowing Skills fed by SLP; rate is too slow  -LS        Positioning During Eating upright 90 degree; upright in bed  -        Utensils Used spoon  -LS                  Respiratory    Respiratory Status WFL  -LS                  Clinical Swallow Eval    Oral Prep Phase impaired  -LS        Oral Transit WFL  -LS        Oral Residue WFL  -LS        Pharyngeal Phase suspected pharyngeal impairment  -LS                  Oral Prep Concerns    Oral Prep Concerns prolonged mastication  with puree  -        Prolonged Mastication pudding  -                  Pharyngeal Phase Concerns    Pharyngeal Phase Concerns throat clear; other (see comments)  -        Throat Clear thin  -LS        Pharyngeal Phase Concerns, Comment --  aufible swallow with thins/  -LS                  Clinical Impression    SLP Swallowing Diagnosis mild-moderate; suspected pharyngeal dysphagia; oral dysphagia  -        Functional Impact risk of aspiration/pneumonia  -        Rehab Potential/Prognosis, Swallowing good, to achieve stated therapy goals  -        Swallow Criteria for Skilled Therapeutic Interventions Met demonstrates skilled criteria  -                  Recommendations    Therapy Frequency (Swallow) PRN  -        Predicted Duration Therapy Intervention (Days) until discharge  -        SLP Diet Recommendation puree; nectar thick liquids  -        Recommended Diagnostics reassess via clinical swallow evaluation  -        Recommended Precautions and Strategies small bites of food and sips of liquid; liquid via spoon only  -        Oral Care  Recommendations Oral Care before breakfast, after meals and PRN  -        SLP Rec. for Method of Medication Administration meds crushed; with pudding or applesauce  -LS        Monitor for Signs of Aspiration yes; notify SLP if any concerns  -LS        Anticipated Discharge Disposition (SLP) home with assist  -LS                  Swallow Goals (SLP)    Oral Nutrition/Hydration Goal Selection (SLP) oral nutrition/hydration, SLP goal 1; other (see comments)  -LS                  Oral Nutrition/Hydration Goal 1 (SLP)    Time Frame (Oral Nutrition/Hydration Goal 1, SLP) by discharge  -LS              User Key  (r) = Recorded By, (t) = Taken By, (c) = Cosigned By    Initials Name Effective Dates    Augusto Miller MS CCC-SLP 06/16/21 -                 EDUCATION  The patient has been educated in the following areas:   Dysphagia (Swallowing Impairment).        SLP GOALS     Row Name 10/09/21 0800             Oral Nutrition/Hydration Goal 1 (SLP)    Time Frame (Oral Nutrition/Hydration Goal 1, SLP) by discharge  -LS            User Key  (r) = Recorded By, (t) = Taken By, (c) = Cosigned By    Initials Name Provider Type    Augusto Miller MS CCC-SLP Speech and Language Pathologist              SLP Outcome Measures (last 72 hours)     SLP Outcome Measures     Row Name 10/09/21 0800             SLP Outcome Measures    Outcome Measure Used? Adult NOMS  -LS              Adult FCM Scores    FCM Chosen Swallowing  -      Swallowing FCM Score 4  -            User Key  (r) = Recorded By, (t) = Taken By, (c) = Cosigned By    Initials Name Effective Dates    Augusto Miller MS CCC-SLP 06/16/21 -                  Time Calculation:    Time Calculation- SLP     Row Name 10/09/21 0849             Time Calculation- SLP    SLP Start Time 0715  -      SLP Stop Time 0900  -      SLP Time Calculation (min) 105 min  -LS            User Key  (r) = Recorded By, (t) = Taken By, (c) = Cosigned By    Initials Name Provider Type     LS Augusto Simons, MS CCC-SLP Speech and Language Pathologist                Therapy Charges for Today     Code Description Service Date Service Provider Modifiers Qty    [id number] HC ST EVAL ORAL PHARYNG SWALLOW 5 10/9/2021 Augusto Simons MS CCC-SLP GN 1               Augusto Simons MS CCC-SLP  10/9/2021

## 2021-10-09 NOTE — DISCHARGE SUMMARY
Patient Name: Mary Alice Sanders  : 1935  MRN: [id number]    Date of Admission: 10/7/2021  Date of Discharge:  10/9/2021  Primary Care Physician: Gera Barekr MD      Chief Complaint:   Altered Mental Status (unknown last known normal, found slumpmed over in chair snoring loudly. baseline is confused but responsive.  pt is not following commands at all on arrival)      Discharge Diagnoses     Active Hospital Problems    Diagnosis  POA   • **Vasovagal episode [R55]  Yes   • DNR (do not resuscitate) [Z66]  Yes   • Malnutrition (HCC) [E46]  Yes   • Age-related physical debility [R54]  Yes   • Decreased responsiveness [R41.89]  Yes   • Alzheimer's disease (HCC) [G30.9, F02.80]  Yes   • BP (high blood pressure) [I10]  Yes      Resolved Hospital Problems   No resolved problems to display.        Hospital Course     Ms. Sanders is a 86 y.o. female with history of advanced dementia admitted with Vasovagal episode.  Her son says she was upstairs and he heard a loud noise.  When he found her, she was unresponsive in a chair with her head slumped forward on her chest.  This episode lasted only a few minutes. She came to quickly and returned to baseline by the time she arrived to the hospital.  CT head showed extensive small vessel ischemic disease but no bleeding or infarct.  There is low concern for stroke based on the timing and nature of her symptoms.  MRI brain was not done.  She had no fever, leukocytosis or other infectious symptoms.  Her chest x-ray did not show any infection, and her urinalysis was clean.  Per son, she is still able to walk around with supervision and dress herself.    She needs full assistance for bathing and transferring. She is on a liquid diet, drinks 3 boost per day.  Does not eat solid food anymore.  She does little talking at home.  She recognizes her son, but not his brother who lives out of town and rarely sees her. We discussed her prognosis in light of her advanced dementia, which  "is poor.   He has a good understanding of her condition, and wants her to \"live out her life\" at home.    She is a DNR.     Day of Discharge     Subjective:  Resting comfortably in bed, no acute events overnight.  She smiles at me and speaks nonsensical words.     Review of Systems  Not able to obtain due to patient's mental status    Physical Exam:  Temp:  [97 °F (36.1 °C)-98.6 °F (37 °C)] 98 °F (36.7 °C)  Heart Rate:  [86-93] 89  Resp:  [16-18] 16  BP: (114-136)/(67-87) 114/87  Body mass index is 19.74 kg/m².  Physical Exam  Constitutional:       General: She is not in acute distress.     Appearance: She is not diaphoretic.      Comments: Frail, chronically ill-appearing.  Temporal and supraclavicular wasting   HENT:      Mouth/Throat:      Mouth: Mucous membranes are moist.   Eyes:      General: No scleral icterus.  Cardiovascular:      Rate and Rhythm: Normal rate and regular rhythm.      Pulses: Normal pulses.      Heart sounds: No murmur heard.  No gallop.    Pulmonary:      Effort: Pulmonary effort is normal. No respiratory distress.      Breath sounds: No wheezing or rhonchi.   Abdominal:      Palpations: Abdomen is soft.      Tenderness: There is no abdominal tenderness. There is no guarding.   Skin:     General: Skin is warm and dry.   Neurological:      Comments: Opens eyes to voice, speaks nonsensical sentences. does not follow commands.  Moves all extremities spontaneously.  Globally weak         Consultants     Consult Orders (all) (From admission, onward)     Start     Ordered    10/08/21 1430  Inpatient Case Management  Consult  Once        Provider:  (Not yet assigned)    10/08/21 1430    10/07/21 1656  [facility] (on-call MD unless specified) Details  Once        Specialty:  Hospitalist  Provider:  Odilia Werner MD    10/07/21 1651              Procedures     Imaging Results (All)     Procedure Component Value Units Date/Time    CT Head Without Contrast [[id number]] Collected: 10/08/21 0835 "     Updated: 10/08/21 0836    Narrative:      CT HEAD WITHOUT CONTRAST     HISTORY: Altered mental status, dementia, confusion, agitation.     COMPARISON: None.     FINDINGS:     The study is limited by patient motion. There is moderate diffuse  atrophy with secondary enlargement of the lateral ventricles. Confluent  areas of decreased attenuation are appreciated involving the white  matter of the cerebral hemispheres bilaterally, nonspecific, but  consistent with extensive small vessel ischemic disease. There is no  evidence of hemorrhage, acute infarction or of abnormal extra-axial  fluid. Further evaluation could be performed with MRI examination of the  brain as indicated.           Radiation dose reduction techniques were utilized, including automated  exposure control and exposure modulation based on body size.     This report was finalized on 10/8/2021 8:35 AM by Dr. Benson Emanuel M.D.       XR Chest 1 View [[id number]] Collected: 10/07/21 1532     Updated: 10/07/21 1537    Narrative:      XR CHEST 1 VW-     HISTORY: Female who is 86 years-old,  altered mental status     TECHNIQUE: Frontal view of the chest     COMPARISON: 04/18/2014     FINDINGS: The heart size is normal. The aorta appears ectatic. Pulmonary  vasculature is unremarkable. No focal pulmonary consolidation, pleural  effusion, or pneumothorax. No acute osseous process.       Impression:      No focal pulmonary consolidation. Ectatic appearing aorta.  Follow-up as clinically indicated.     This report was finalized on 10/7/2021 3:34 PM by Dr. Darrick Morales M.D.             Pertinent Labs     Results from last 7 days   Lab Units 10/08/21  0905 10/07/21  1503   WBC 10*3/mm3 5.78 5.64   HEMOGLOBIN g/dL 12.0 13.1   PLATELETS 10*3/mm3 177 200     Results from last 7 days   Lab Units 10/08/21  0905 10/07/21  1503   SODIUM mmol/L 139 144   POTASSIUM mmol/L 3.9 4.0   CHLORIDE mmol/L 107 107   CO2 mmol/L 22.2 25.5   BUN mg/dL 13 17   CREATININE  mg/dL 0.48* 0.60   GLUCOSE mg/dL 103* 82   Estimated Creatinine Clearance: 42.9 mL/min (A) (by C-G formula based on SCr of 0.48 mg/dL (L)).  Results from last 7 days   Lab Units 10/08/21  0905 10/07/21  1503   ALBUMIN g/dL 3.80 4.00   BILIRUBIN mg/dL 0.7 0.4   ALK PHOS U/L 67 71   AST (SGOT) U/L 16 17   ALT (SGPT) U/L 10 14     Results from last 7 days   Lab Units 10/08/21  0905 10/07/21  1503   CALCIUM mg/dL 8.9 9.1   ALBUMIN g/dL 3.80 4.00               Invalid input(s): LDLCALC        Test Results Pending at Discharge       Discharge Details        Discharge Medications      Patient Not Prescribed Medications Upon Discharge         Allergies   Allergen Reactions   • Eggs Or Egg-Derived Products Other (See Comments)     ukno         Discharge Disposition:  Home or Self Care    Discharge Diet:  Diet Order   Procedures   • NPO Diet   Encourage liquid supplements    Discharge Activity:   As tolerated    CODE STATUS:    Code Status and Medical Interventions:   Ordered at: 10/08/21 1430     Level Of Support Discussed With:    Health Care Surrogate     Code Status:    No CPR     Medical Interventions (Level of Support Prior to Arrest):    Full       No future appointments.   Follow-up Information     Gera Barker MD Follow up in 1 week(s).    Specialty: Internal Medicine  Contact information:  [address] [facility] [address] [phone]                         Time Spent on Discharge:  Greater than 30 minutes      Kaye Hollingsworth [facility]  10/09/21  07:51 EDT

## 2021-10-09 NOTE — OUTREACH NOTE
Prep Survey      Responses   [facility] facility patient discharged from? [place]   Is LACE score < 7 ? Yes   Emergency Room discharge w/ pulse ox? No   Eligibility [facility]   Date of Admission 10/07/21   Date of Discharge 10/09/21   Discharge Disposition Home or Self Care   Discharge diagnosis Vasovagal episdoe   Does the patient have one of the following disease processes/diagnoses(primary or secondary)? Other   Does the patient have Home health ordered? Yes   What is the Home health agency?  [facility]   Is there a DME ordered? No   Prep survey completed? Yes          Lucy Madden RN

## 2021-10-11 ENCOUNTER — TRANSITIONAL CARE MANAGEMENT TELEPHONE ENCOUNTER (OUTPATIENT)
Dept: CALL CENTER | Facility: HOSPITAL | Age: 86
End: 2021-10-11

## 2021-10-11 NOTE — OUTREACH NOTE
Call Center TCM Note      Responses   [facility] patient discharged from? [place]   Does the patient have one of the following disease processes/diagnoses(primary or secondary)? Other   TCM attempt successful? Yes   Call start time 1300   Call end time 1303   Discharge diagnosis Vasovagal episdoe   Person spoke with today (if not patient) and relationship daniel Darnell   Meds reviewed with patient/caregiver? Yes   Is the patient having any side effects they believe may be caused by any medication additions or changes? No   Does the patient have all medications ordered at discharge? N/A   Is the patient taking all medications as directed (includes completed medication regime)? Yes   Does the patient have a primary care provider?  Yes   Does the patient have an appointment with their PCP within 7 days of discharge? Greater than 7 days   Comments regarding PCP hospital fu appt on 10/18/21 at 2:00 PM   Nursing Interventions Verified appointment date/time/provider   Has the patient kept scheduled appointments due by today? N/A   What is the Home health agency?  [facility]   Has home health visited the patient within 72 hours of discharge? Yes   Psychosocial issues? No   Did the patient receive a copy of their discharge instructions? Yes   Nursing interventions Reviewed instructions with patient   What is the patient's perception of their health status since discharge? Improving   Is the patient/caregiver able to teach back signs and symptoms related to disease process for when to call PCP? Yes   Is the patient/caregiver able to teach back signs and symptoms related to disease process for when to call 911? Yes   Is the patient/caregiver able to teach back the hierarchy of who to call/visit for symptoms/problems? PCP, Specialist, Home health nurse, Urgent Care, ED, 911 Yes   If the patient is a current smoker, are they able to teach back resources for cessation? Not a smoker   TCM call completed? Yes   Wrap up additional  comments Mann, son states pt is doing better. Mann verified [facility] hospital fu appt on 10/18/21. No questions/concerns.          Eladia Veliz RN    10/11/2021, 13:04 EDT

## 2022-11-30 ENCOUNTER — APPOINTMENT (OUTPATIENT)
Dept: GENERAL RADIOLOGY | Facility: HOSPITAL | Age: 87
End: 2022-11-30

## 2022-11-30 ENCOUNTER — APPOINTMENT (OUTPATIENT)
Dept: CT IMAGING | Facility: HOSPITAL | Age: 87
End: 2022-11-30

## 2022-11-30 ENCOUNTER — HOSPITAL ENCOUNTER (INPATIENT)
Facility: HOSPITAL | Age: 87
LOS: 15 days | Discharge: HOSPICE | End: 2022-12-15
Attending: EMERGENCY MEDICINE

## 2022-11-30 DIAGNOSIS — R41.82 ALTERED MENTAL STATUS, UNSPECIFIED ALTERED MENTAL STATUS TYPE: ICD-10-CM

## 2022-11-30 DIAGNOSIS — E87.20 LACTIC ACID ACIDOSIS: ICD-10-CM

## 2022-11-30 DIAGNOSIS — N17.9 AKI (ACUTE KIDNEY INJURY): ICD-10-CM

## 2022-11-30 DIAGNOSIS — E87.0 HYPERNATREMIA: Primary | ICD-10-CM

## 2022-11-30 LAB
ALBUMIN SERPL-MCNC: 3 G/DL (ref 3.5–5.2)
ALBUMIN SERPL-MCNC: 3.2 G/DL (ref 3.5–5.2)
ALBUMIN SERPL-MCNC: 3.3 G/DL (ref 3.5–5.2)
ALBUMIN/GLOB SERPL: 1.2 G/DL
ALP SERPL-CCNC: 104 U/L (ref 39–117)
ALT SERPL W P-5'-P-CCNC: 40 U/L (ref 1–33)
AMMONIA BLD-SCNC: 23 UMOL/L (ref 11–51)
ANION GAP SERPL CALCULATED.3IONS-SCNC: ABNORMAL MMOL/L
ARTERIAL PATENCY WRIST A: POSITIVE
AST SERPL-CCNC: 24 U/L (ref 1–32)
ATMOSPHERIC PRESS: 755.3 MMHG
B PARAPERT DNA SPEC QL NAA+PROBE: NOT DETECTED
B PERT DNA SPEC QL NAA+PROBE: NOT DETECTED
BACTERIA UR QL AUTO: ABNORMAL /HPF
BASE EXCESS BLDA CALC-SCNC: -1.2 MMOL/L (ref 0–2)
BASOPHILS # BLD AUTO: 0.03 10*3/MM3 (ref 0–0.2)
BASOPHILS NFR BLD AUTO: 0.3 % (ref 0–1.5)
BDY SITE: ABNORMAL
BILIRUB SERPL-MCNC: 1.1 MG/DL (ref 0–1.2)
BILIRUB UR QL STRIP: NEGATIVE
BUN SERPL-MCNC: 63 MG/DL (ref 8–23)
BUN SERPL-MCNC: 68 MG/DL (ref 8–23)
BUN SERPL-MCNC: 70 MG/DL (ref 8–23)
BUN/CREAT SERPL: 45.3 (ref 7–25)
BUN/CREAT SERPL: 50.4 (ref 7–25)
BUN/CREAT SERPL: 55.1 (ref 7–25)
C PNEUM DNA NPH QL NAA+NON-PROBE: NOT DETECTED
CALCIUM SPEC-SCNC: 7.5 MG/DL (ref 8.6–10.5)
CALCIUM SPEC-SCNC: 8.2 MG/DL (ref 8.6–10.5)
CALCIUM SPEC-SCNC: 8.6 MG/DL (ref 8.6–10.5)
CHLORIDE SERPL-SCNC: >140 MMOL/L (ref 98–107)
CLARITY UR: CLEAR
CO2 SERPL-SCNC: 19 MMOL/L (ref 22–29)
CO2 SERPL-SCNC: 21 MMOL/L (ref 22–29)
CO2 SERPL-SCNC: 23.2 MMOL/L (ref 22–29)
COLOR UR: ABNORMAL
CREAT SERPL-MCNC: 1.25 MG/DL (ref 0.57–1)
CREAT SERPL-MCNC: 1.27 MG/DL (ref 0.57–1)
CREAT SERPL-MCNC: 1.5 MG/DL (ref 0.57–1)
D-LACTATE SERPL-SCNC: 2.4 MMOL/L (ref 0.5–2)
D-LACTATE SERPL-SCNC: 3.7 MMOL/L (ref 0.5–2)
D-LACTATE SERPL-SCNC: 4.4 MMOL/L (ref 0.5–2)
D-LACTATE SERPL-SCNC: 4.4 MMOL/L (ref 0.5–2)
DEPRECATED RDW RBC AUTO: 45.6 FL (ref 37–54)
EGFRCR SERPLBLD CKD-EPI 2021: 33.6 ML/MIN/1.73
EGFRCR SERPLBLD CKD-EPI 2021: 41 ML/MIN/1.73
EGFRCR SERPLBLD CKD-EPI 2021: 41.8 ML/MIN/1.73
EOSINOPHIL # BLD AUTO: 0 10*3/MM3 (ref 0–0.4)
EOSINOPHIL NFR BLD AUTO: 0 % (ref 0.3–6.2)
ERYTHROCYTE [DISTWIDTH] IN BLOOD BY AUTOMATED COUNT: 12.8 % (ref 12.3–15.4)
FLUAV SUBTYP SPEC NAA+PROBE: NOT DETECTED
FLUBV RNA ISLT QL NAA+PROBE: NOT DETECTED
GAS FLOW AIRWAY: 4 LPM
GLOBULIN UR ELPH-MCNC: 2.8 GM/DL
GLUCOSE BLDC GLUCOMTR-MCNC: 121 MG/DL (ref 70–130)
GLUCOSE BLDC GLUCOMTR-MCNC: 139 MG/DL (ref 70–130)
GLUCOSE BLDC GLUCOMTR-MCNC: 190 MG/DL (ref 70–130)
GLUCOSE SERPL-MCNC: 119 MG/DL (ref 65–99)
GLUCOSE SERPL-MCNC: 133 MG/DL (ref 65–99)
GLUCOSE SERPL-MCNC: 157 MG/DL (ref 65–99)
GLUCOSE UR STRIP-MCNC: NEGATIVE MG/DL
HADV DNA SPEC NAA+PROBE: NOT DETECTED
HCO3 BLDA-SCNC: 21.4 MMOL/L (ref 22–28)
HCOV 229E RNA SPEC QL NAA+PROBE: NOT DETECTED
HCOV HKU1 RNA SPEC QL NAA+PROBE: NOT DETECTED
HCOV NL63 RNA SPEC QL NAA+PROBE: NOT DETECTED
HCOV OC43 RNA SPEC QL NAA+PROBE: NOT DETECTED
HCT VFR BLD AUTO: 52.5 % (ref 34–46.6)
HGB BLD-MCNC: 17.2 G/DL (ref 12–15.9)
HGB UR QL STRIP.AUTO: NEGATIVE
HMPV RNA NPH QL NAA+NON-PROBE: NOT DETECTED
HPIV1 RNA ISLT QL NAA+PROBE: NOT DETECTED
HPIV2 RNA SPEC QL NAA+PROBE: NOT DETECTED
HPIV3 RNA NPH QL NAA+PROBE: NOT DETECTED
HPIV4 P GENE NPH QL NAA+PROBE: NOT DETECTED
HYALINE CASTS UR QL AUTO: ABNORMAL /LPF
KETONES UR QL STRIP: NEGATIVE
LEUKOCYTE ESTERASE UR QL STRIP.AUTO: NEGATIVE
LIPASE SERPL-CCNC: 93 U/L (ref 13–60)
LYMPHOCYTES # BLD AUTO: 3.03 10*3/MM3 (ref 0.7–3.1)
LYMPHOCYTES NFR BLD AUTO: 26.5 % (ref 19.6–45.3)
M PNEUMO IGG SER IA-ACNC: NOT DETECTED
MAGNESIUM SERPL-MCNC: 2.7 MG/DL (ref 1.6–2.4)
MCH RBC QN AUTO: 31.7 PG (ref 26.6–33)
MCHC RBC AUTO-ENTMCNC: 32.8 G/DL (ref 31.5–35.7)
MCV RBC AUTO: 96.9 FL (ref 79–97)
MODALITY: ABNORMAL
MONOCYTES # BLD AUTO: 0.68 10*3/MM3 (ref 0.1–0.9)
MONOCYTES NFR BLD AUTO: 6 % (ref 5–12)
NEUTROPHILS NFR BLD AUTO: 66.8 % (ref 42.7–76)
NEUTROPHILS NFR BLD AUTO: 7.64 10*3/MM3 (ref 1.7–7)
NITRITE UR QL STRIP: NEGATIVE
PCO2 BLDA: 30.3 MM HG (ref 35–45)
PH BLDA: 7.46 PH UNITS (ref 7.35–7.45)
PH UR STRIP.AUTO: <=5 [PH] (ref 5–8)
PHOSPHATE SERPL-MCNC: 2.5 MG/DL (ref 2.5–4.5)
PHOSPHATE SERPL-MCNC: 2.9 MG/DL (ref 2.5–4.5)
PLATELET # BLD AUTO: 101 10*3/MM3 (ref 140–450)
PO2 BLDA: 65.7 MM HG (ref 80–100)
POTASSIUM SERPL-SCNC: 3.5 MMOL/L (ref 3.5–5.2)
POTASSIUM SERPL-SCNC: 3.6 MMOL/L (ref 3.5–5.2)
POTASSIUM SERPL-SCNC: 3.8 MMOL/L (ref 3.5–5.2)
PROCALCITONIN SERPL-MCNC: 0.18 NG/ML (ref 0–0.25)
PROT SERPL-MCNC: 6.1 G/DL (ref 6–8.5)
PROT UR QL STRIP: ABNORMAL
QT INTERVAL: 411 MS
RBC # BLD AUTO: 5.42 10*6/MM3 (ref 3.77–5.28)
RBC # UR STRIP: ABNORMAL /HPF
REF LAB TEST METHOD: ABNORMAL
RHINOVIRUS RNA SPEC NAA+PROBE: NOT DETECTED
RSV RNA NPH QL NAA+NON-PROBE: NOT DETECTED
SAO2 % BLDCOA: 94 % (ref 92–99)
SARS-COV-2 RNA NPH QL NAA+NON-PROBE: DETECTED
SODIUM SERPL-SCNC: 173 MMOL/L (ref 136–145)
SODIUM SERPL-SCNC: >180 MMOL/L (ref 136–145)
SODIUM SERPL-SCNC: >180 MMOL/L (ref 136–145)
SP GR UR STRIP: 1.03 (ref 1–1.03)
SQUAMOUS #/AREA URNS HPF: ABNORMAL /HPF
TOTAL RATE: 18 BREATHS/MINUTE
TROPONIN T SERPL-MCNC: 0.03 NG/ML (ref 0–0.03)
UROBILINOGEN UR QL STRIP: ABNORMAL
WBC # UR STRIP: ABNORMAL /HPF
WBC NRBC COR # BLD: 11.42 10*3/MM3 (ref 3.4–10.8)

## 2022-11-30 PROCEDURE — 0 POTASSIUM CHLORIDE 10 MEQ/100ML SOLUTION: Performed by: INTERNAL MEDICINE

## 2022-11-30 PROCEDURE — 70450 CT HEAD/BRAIN W/O DYE: CPT

## 2022-11-30 PROCEDURE — 82962 GLUCOSE BLOOD TEST: CPT

## 2022-11-30 PROCEDURE — 83735 ASSAY OF MAGNESIUM: CPT | Performed by: INTERNAL MEDICINE

## 2022-11-30 PROCEDURE — 80053 COMPREHEN METABOLIC PANEL: CPT | Performed by: EMERGENCY MEDICINE

## 2022-11-30 PROCEDURE — 87040 BLOOD CULTURE FOR BACTERIA: CPT | Performed by: EMERGENCY MEDICINE

## 2022-11-30 PROCEDURE — 99285 EMERGENCY DEPT VISIT HI MDM: CPT

## 2022-11-30 PROCEDURE — 83605 ASSAY OF LACTIC ACID: CPT | Performed by: EMERGENCY MEDICINE

## 2022-11-30 PROCEDURE — 63710000001 INSULIN REGULAR HUMAN PER 5 UNITS: Performed by: INTERNAL MEDICINE

## 2022-11-30 PROCEDURE — 36600 WITHDRAWAL OF ARTERIAL BLOOD: CPT

## 2022-11-30 PROCEDURE — 36415 COLL VENOUS BLD VENIPUNCTURE: CPT | Performed by: EMERGENCY MEDICINE

## 2022-11-30 PROCEDURE — 93010 ELECTROCARDIOGRAM REPORT: CPT | Performed by: INTERNAL MEDICINE

## 2022-11-30 PROCEDURE — 85025 COMPLETE CBC W/AUTO DIFF WBC: CPT | Performed by: EMERGENCY MEDICINE

## 2022-11-30 PROCEDURE — 0202U NFCT DS 22 TRGT SARS-COV-2: CPT | Performed by: EMERGENCY MEDICINE

## 2022-11-30 PROCEDURE — 71045 X-RAY EXAM CHEST 1 VIEW: CPT

## 2022-11-30 PROCEDURE — P9612 CATHETERIZE FOR URINE SPEC: HCPCS

## 2022-11-30 PROCEDURE — 93005 ELECTROCARDIOGRAM TRACING: CPT | Performed by: EMERGENCY MEDICINE

## 2022-11-30 PROCEDURE — 84145 PROCALCITONIN (PCT): CPT | Performed by: EMERGENCY MEDICINE

## 2022-11-30 PROCEDURE — 82140 ASSAY OF AMMONIA: CPT | Performed by: EMERGENCY MEDICINE

## 2022-11-30 PROCEDURE — 82803 BLOOD GASES ANY COMBINATION: CPT

## 2022-11-30 PROCEDURE — 83690 ASSAY OF LIPASE: CPT | Performed by: EMERGENCY MEDICINE

## 2022-11-30 PROCEDURE — 25010000002 ENOXAPARIN PER 10 MG: Performed by: INTERNAL MEDICINE

## 2022-11-30 PROCEDURE — 84100 ASSAY OF PHOSPHORUS: CPT | Performed by: EMERGENCY MEDICINE

## 2022-11-30 PROCEDURE — 84484 ASSAY OF TROPONIN QUANT: CPT | Performed by: EMERGENCY MEDICINE

## 2022-11-30 PROCEDURE — 81001 URINALYSIS AUTO W/SCOPE: CPT | Performed by: EMERGENCY MEDICINE

## 2022-11-30 RX ORDER — ONDANSETRON 2 MG/ML
4 INJECTION INTRAMUSCULAR; INTRAVENOUS EVERY 6 HOURS PRN
Status: DISCONTINUED | OUTPATIENT
Start: 2022-11-30 | End: 2022-12-15 | Stop reason: HOSPADM

## 2022-11-30 RX ORDER — SODIUM CHLORIDE 0.9 % (FLUSH) 0.9 %
10 SYRINGE (ML) INJECTION EVERY 12 HOURS SCHEDULED
Status: DISCONTINUED | OUTPATIENT
Start: 2022-11-30 | End: 2022-12-15 | Stop reason: HOSPADM

## 2022-11-30 RX ORDER — SODIUM CHLORIDE 450 MG/100ML
125 INJECTION, SOLUTION INTRAVENOUS CONTINUOUS
Status: DISCONTINUED | OUTPATIENT
Start: 2022-11-30 | End: 2022-11-30

## 2022-11-30 RX ORDER — POTASSIUM CHLORIDE 7.45 MG/ML
10 INJECTION INTRAVENOUS
Status: COMPLETED | OUTPATIENT
Start: 2022-11-30 | End: 2022-11-30

## 2022-11-30 RX ORDER — ONDANSETRON 4 MG/1
4 TABLET, FILM COATED ORAL EVERY 6 HOURS PRN
Status: DISCONTINUED | OUTPATIENT
Start: 2022-11-30 | End: 2022-12-15 | Stop reason: HOSPADM

## 2022-11-30 RX ORDER — DEXTROSE MONOHYDRATE 25 G/50ML
25 INJECTION, SOLUTION INTRAVENOUS
Status: DISCONTINUED | OUTPATIENT
Start: 2022-11-30 | End: 2022-12-15 | Stop reason: HOSPADM

## 2022-11-30 RX ORDER — ENOXAPARIN SODIUM 100 MG/ML
30 INJECTION SUBCUTANEOUS DAILY
Status: DISCONTINUED | OUTPATIENT
Start: 2022-11-30 | End: 2022-12-02

## 2022-11-30 RX ORDER — DEXTROSE MONOHYDRATE 50 MG/ML
100 INJECTION, SOLUTION INTRAVENOUS CONTINUOUS
Status: DISCONTINUED | OUTPATIENT
Start: 2022-11-30 | End: 2022-12-03

## 2022-11-30 RX ORDER — ACETAMINOPHEN 650 MG/1
650 SUPPOSITORY RECTAL EVERY 4 HOURS PRN
Status: DISCONTINUED | OUTPATIENT
Start: 2022-11-30 | End: 2022-12-15 | Stop reason: HOSPADM

## 2022-11-30 RX ORDER — NICOTINE POLACRILEX 4 MG
15 LOZENGE BUCCAL
Status: DISCONTINUED | OUTPATIENT
Start: 2022-11-30 | End: 2022-12-15 | Stop reason: HOSPADM

## 2022-11-30 RX ORDER — IPRATROPIUM BROMIDE AND ALBUTEROL SULFATE 2.5; .5 MG/3ML; MG/3ML
3 SOLUTION RESPIRATORY (INHALATION) EVERY 6 HOURS PRN
Status: DISCONTINUED | OUTPATIENT
Start: 2022-11-30 | End: 2022-12-15 | Stop reason: HOSPADM

## 2022-11-30 RX ORDER — SODIUM CHLORIDE 9 MG/ML
40 INJECTION, SOLUTION INTRAVENOUS AS NEEDED
Status: DISCONTINUED | OUTPATIENT
Start: 2022-11-30 | End: 2022-12-15 | Stop reason: HOSPADM

## 2022-11-30 RX ORDER — DEXTROSE MONOHYDRATE 50 MG/ML
200 INJECTION, SOLUTION INTRAVENOUS CONTINUOUS
Status: ACTIVE | OUTPATIENT
Start: 2022-11-30 | End: 2022-11-30

## 2022-11-30 RX ORDER — SODIUM CHLORIDE 0.9 % (FLUSH) 0.9 %
10 SYRINGE (ML) INJECTION AS NEEDED
Status: DISCONTINUED | OUTPATIENT
Start: 2022-11-30 | End: 2022-12-15 | Stop reason: HOSPADM

## 2022-11-30 RX ORDER — ACETAMINOPHEN 325 MG/1
650 TABLET ORAL EVERY 4 HOURS PRN
Status: DISCONTINUED | OUTPATIENT
Start: 2022-11-30 | End: 2022-12-15 | Stop reason: HOSPADM

## 2022-11-30 RX ADMIN — SODIUM CHLORIDE 125 ML/HR: 4.5 INJECTION, SOLUTION INTRAVENOUS at 16:40

## 2022-11-30 RX ADMIN — Medication 10 ML: at 20:30

## 2022-11-30 RX ADMIN — ENOXAPARIN SODIUM 30 MG: 30 INJECTION SUBCUTANEOUS at 16:12

## 2022-11-30 RX ADMIN — DEXTROSE MONOHYDRATE 125 ML/HR: 50 INJECTION, SOLUTION INTRAVENOUS at 23:43

## 2022-11-30 RX ADMIN — POTASSIUM CHLORIDE 10 MEQ: 7.46 INJECTION, SOLUTION INTRAVENOUS at 18:23

## 2022-11-30 RX ADMIN — SODIUM CHLORIDE 500 ML: 9 INJECTION, SOLUTION INTRAVENOUS at 13:51

## 2022-11-30 RX ADMIN — DEXTROSE MONOHYDRATE 125 ML/HR: 50 INJECTION, SOLUTION INTRAVENOUS at 23:07

## 2022-11-30 RX ADMIN — INSULIN HUMAN 2 UNITS: 100 INJECTION, SOLUTION PARENTERAL at 23:48

## 2022-11-30 RX ADMIN — DEXTROSE MONOHYDRATE 200 ML/HR: 50 INJECTION, SOLUTION INTRAVENOUS at 18:23

## 2022-11-30 RX ADMIN — POTASSIUM CHLORIDE 10 MEQ: 7.46 INJECTION, SOLUTION INTRAVENOUS at 20:29

## 2022-12-01 PROBLEM — E43 SEVERE MALNUTRITION: Status: ACTIVE | Noted: 2022-12-01

## 2022-12-01 LAB
ALBUMIN SERPL-MCNC: 2.8 G/DL (ref 3.5–5.2)
ALBUMIN SERPL-MCNC: 2.9 G/DL (ref 3.5–5.2)
ALBUMIN SERPL-MCNC: 3.2 G/DL (ref 3.5–5.2)
ANION GAP SERPL CALCULATED.3IONS-SCNC: 10.1 MMOL/L (ref 5–15)
ANION GAP SERPL CALCULATED.3IONS-SCNC: 12.2 MMOL/L (ref 5–15)
ANION GAP SERPL CALCULATED.3IONS-SCNC: ABNORMAL MMOL/L
BASOPHILS # BLD AUTO: 0.02 10*3/MM3 (ref 0–0.2)
BASOPHILS NFR BLD AUTO: 0.2 % (ref 0–1.5)
BUN SERPL-MCNC: 43 MG/DL (ref 8–23)
BUN SERPL-MCNC: 46 MG/DL (ref 8–23)
BUN SERPL-MCNC: 60 MG/DL (ref 8–23)
BUN/CREAT SERPL: 44.2 (ref 7–25)
BUN/CREAT SERPL: 44.3 (ref 7–25)
BUN/CREAT SERPL: 51.7 (ref 7–25)
CALCIUM SPEC-SCNC: 7.3 MG/DL (ref 8.6–10.5)
CALCIUM SPEC-SCNC: 7.7 MG/DL (ref 8.6–10.5)
CALCIUM SPEC-SCNC: 8.3 MG/DL (ref 8.6–10.5)
CHLORIDE SERPL-SCNC: 132 MMOL/L (ref 98–107)
CHLORIDE SERPL-SCNC: 135 MMOL/L (ref 98–107)
CHLORIDE SERPL-SCNC: >140 MMOL/L (ref 98–107)
CO2 SERPL-SCNC: 15 MMOL/L (ref 22–29)
CO2 SERPL-SCNC: 18.9 MMOL/L (ref 22–29)
CO2 SERPL-SCNC: 20.8 MMOL/L (ref 22–29)
CREAT SERPL-MCNC: 0.97 MG/DL (ref 0.57–1)
CREAT SERPL-MCNC: 1.04 MG/DL (ref 0.57–1)
CREAT SERPL-MCNC: 1.16 MG/DL (ref 0.57–1)
D-LACTATE SERPL-SCNC: 3.2 MMOL/L (ref 0.5–2)
D-LACTATE SERPL-SCNC: 4.6 MMOL/L (ref 0.5–2)
DEPRECATED RDW RBC AUTO: 45.4 FL (ref 37–54)
EGFRCR SERPLBLD CKD-EPI 2021: 45.7 ML/MIN/1.73
EGFRCR SERPLBLD CKD-EPI 2021: 52.1 ML/MIN/1.73
EGFRCR SERPLBLD CKD-EPI 2021: 56.7 ML/MIN/1.73
EOSINOPHIL # BLD AUTO: 0.01 10*3/MM3 (ref 0–0.4)
EOSINOPHIL NFR BLD AUTO: 0.1 % (ref 0.3–6.2)
ERYTHROCYTE [DISTWIDTH] IN BLOOD BY AUTOMATED COUNT: 13.2 % (ref 12.3–15.4)
GLUCOSE BLDC GLUCOMTR-MCNC: 116 MG/DL (ref 70–130)
GLUCOSE BLDC GLUCOMTR-MCNC: 120 MG/DL (ref 70–130)
GLUCOSE BLDC GLUCOMTR-MCNC: 123 MG/DL (ref 70–130)
GLUCOSE SERPL-MCNC: 119 MG/DL (ref 65–99)
GLUCOSE SERPL-MCNC: 153 MG/DL (ref 65–99)
GLUCOSE SERPL-MCNC: 78 MG/DL (ref 65–99)
HCT VFR BLD AUTO: 44.7 % (ref 34–46.6)
HGB BLD-MCNC: 15.2 G/DL (ref 12–15.9)
IMM GRANULOCYTES # BLD AUTO: 0.04 10*3/MM3 (ref 0–0.05)
IMM GRANULOCYTES NFR BLD AUTO: 0.4 % (ref 0–0.5)
LYMPHOCYTES # BLD AUTO: 2.5 10*3/MM3 (ref 0.7–3.1)
LYMPHOCYTES NFR BLD AUTO: 24 % (ref 19.6–45.3)
MAGNESIUM SERPL-MCNC: 3.1 MG/DL (ref 1.6–2.4)
MCH RBC QN AUTO: 32.5 PG (ref 26.6–33)
MCHC RBC AUTO-ENTMCNC: 34 G/DL (ref 31.5–35.7)
MCV RBC AUTO: 95.5 FL (ref 79–97)
MONOCYTES # BLD AUTO: 0.65 10*3/MM3 (ref 0.1–0.9)
MONOCYTES NFR BLD AUTO: 6.2 % (ref 5–12)
NEUTROPHILS NFR BLD AUTO: 69.1 % (ref 42.7–76)
NEUTROPHILS NFR BLD AUTO: 7.2 10*3/MM3 (ref 1.7–7)
NRBC BLD AUTO-RTO: 0.1 /100 WBC (ref 0–0.2)
PHOSPHATE SERPL-MCNC: 2 MG/DL (ref 2.5–4.5)
PHOSPHATE SERPL-MCNC: 2.1 MG/DL (ref 2.5–4.5)
PHOSPHATE SERPL-MCNC: 3.2 MG/DL (ref 2.5–4.5)
PLATELET # BLD AUTO: 73 10*3/MM3 (ref 140–450)
PMV BLD AUTO: ABNORMAL FL
POTASSIUM SERPL-SCNC: 3.4 MMOL/L (ref 3.5–5.2)
POTASSIUM SERPL-SCNC: 3.6 MMOL/L (ref 3.5–5.2)
POTASSIUM SERPL-SCNC: 4.3 MMOL/L (ref 3.5–5.2)
RBC # BLD AUTO: 4.68 10*6/MM3 (ref 3.77–5.28)
SODIUM SERPL-SCNC: 164 MMOL/L (ref 136–145)
SODIUM SERPL-SCNC: 165 MMOL/L (ref 136–145)
SODIUM SERPL-SCNC: 171 MMOL/L (ref 136–145)
WBC NRBC COR # BLD: 10.42 10*3/MM3 (ref 3.4–10.8)

## 2022-12-01 PROCEDURE — 63710000001 INSULIN REGULAR HUMAN PER 5 UNITS: Performed by: INTERNAL MEDICINE

## 2022-12-01 PROCEDURE — 80069 RENAL FUNCTION PANEL: CPT | Performed by: INTERNAL MEDICINE

## 2022-12-01 PROCEDURE — 85025 COMPLETE CBC W/AUTO DIFF WBC: CPT | Performed by: INTERNAL MEDICINE

## 2022-12-01 PROCEDURE — 25010000002 ENOXAPARIN PER 10 MG: Performed by: INTERNAL MEDICINE

## 2022-12-01 PROCEDURE — 83605 ASSAY OF LACTIC ACID: CPT | Performed by: EMERGENCY MEDICINE

## 2022-12-01 PROCEDURE — 83735 ASSAY OF MAGNESIUM: CPT | Performed by: INTERNAL MEDICINE

## 2022-12-01 PROCEDURE — 92610 EVALUATE SWALLOWING FUNCTION: CPT

## 2022-12-01 PROCEDURE — 82962 GLUCOSE BLOOD TEST: CPT

## 2022-12-01 RX ORDER — PHENYLEPHRINE HCL IN 0.9% NACL 0.5 MG/5ML
.5-3 SYRINGE (ML) INTRAVENOUS
Status: DISCONTINUED | OUTPATIENT
Start: 2022-12-01 | End: 2022-12-02

## 2022-12-01 RX ADMIN — DEXTROSE MONOHYDRATE 150 ML/HR: 50 INJECTION, SOLUTION INTRAVENOUS at 15:29

## 2022-12-01 RX ADMIN — Medication 10 ML: at 22:23

## 2022-12-01 RX ADMIN — Medication 10 ML: at 09:45

## 2022-12-01 RX ADMIN — DEXTROSE MONOHYDRATE 150 ML/HR: 50 INJECTION, SOLUTION INTRAVENOUS at 22:23

## 2022-12-01 RX ADMIN — DEXTROSE MONOHYDRATE 150 ML/HR: 50 INJECTION, SOLUTION INTRAVENOUS at 07:35

## 2022-12-01 RX ADMIN — POTASSIUM PHOSPHATE, MONOBASIC AND POTASSIUM PHOSPHATE, DIBASIC 15 MMOL: 224; 236 INJECTION, SOLUTION, CONCENTRATE INTRAVENOUS at 13:41

## 2022-12-01 RX ADMIN — INSULIN HUMAN 2 UNITS: 100 INJECTION, SOLUTION PARENTERAL at 13:41

## 2022-12-01 RX ADMIN — ENOXAPARIN SODIUM 30 MG: 30 INJECTION SUBCUTANEOUS at 08:29

## 2022-12-01 RX ADMIN — SODIUM CHLORIDE 1000 ML: 9 INJECTION, SOLUTION INTRAVENOUS at 16:39

## 2022-12-01 NOTE — PROGRESS NOTES
[facility] Progress Note      Patient Name: Mary Alice Sanders  : 1935  MRN: [id number]  Primary Care Physician:  Gera Barker MD  Date of admission: 2022    Subjective     Interval History:   Chart reviewed  Saturating well on 2 L/min  Voiding, but UOP not recorded  D5W infusing at 150 mL/h    Review of Systems:   As noted above    Objective     Vitals:   Temp:  [97.8 °F (36.6 °C)-98.7 °F (37.1 °C)] 98.3 °F (36.8 °C)  Heart Rate:  [76-93] 84  Resp:  [10-26] 10  BP: ()/(53-81) 99/65  Flow (L/min):  [2-5] 2    Intake/Output Summary (Last 24 hours) at 2022 1124  Last data filed at 2022 0411  Gross per 24 hour   Intake 2280 ml   Output 0 ml   Net 2280 ml       Physical Exam:    I did not examine the patient to reduce exposure to COVID-19  I last examined her yesterday    Scheduled Meds:     enoxaparin, 30 mg, Subcutaneous, Daily  insulin regular, 0-7 Units, Subcutaneous, Q6H  sodium chloride, 10 mL, Intravenous, Q12H      IV Meds:   dextrose, 150 mL/hr, Last Rate: 150 mL/hr (22 0735)        Results Reviewed:   I have personally reviewed the results from the time of this admission to 2022 11:24 EST     Results from last 7 days   Lab Units 22  0222 22  1943 22  1826 22  1259   SODIUM mmol/L 171* >180* 173* >180*   POTASSIUM mmol/L 3.6 3.8 3.5 3.6   CHLORIDE mmol/L >140* >140* >140* >140*   CO2 mmol/L 15.0* 19.0* 21.0* 23.2   BUN mg/dL 60* 70* 63* 68*   CREATININE mg/dL 1.16* 1.27* 1.25* 1.50*   CALCIUM mg/dL 8.3* 8.6 7.5* 8.2*   BILIRUBIN mg/dL  --   --   --  1.1   ALK PHOS U/L  --   --   --  104   ALT (SGPT) U/L  --   --   --  40*   AST (SGOT) U/L  --   --   --  24   GLUCOSE mg/dL 119* 157* 119* 133*       CrCl cannot be calculated (Unknown ideal weight.).    Results from last 7 days   Lab Units 22  1826   MAGNESIUM mg/dL 3.1*  --  2.7*   PHOSPHORUS mg/dL 2.0* 2.9 2.5             Results  from last 7 days   Lab Units 12/01/22  0222 11/30/22  1140   WBC 10*3/mm3 10.42 11.42*   HEMOGLOBIN g/dL 15.2 17.2*   PLATELETS 10*3/mm3 73* 101*             Assessment / Plan     ASSESSMENT:  1.  DUTCH, with no UOP recorded yet, prerenal due to severe volume depletion and hypotension.  Low-normal potassium; respiratory and metabolic alkalosis.  Elevated lactate; low-normal potassium and phosphorus, nutritional.  Severe dehydration.  Urinalysis: very concentrated, minimal proteinuria, no cells  2.  Hypernatremia due to severe water deficit, in association with change in mental status  3.  Reported confusion superimposed on dementia  4.  Hypotension, likely from hypovolemia  5.  COVID infection  6.  Immobility syndrome with bedsores    PLAN:  1.  Continue D5W 150 mL/h along with surveillance chemistries q6h  2.  Potassium phosphate IV    Thank you for involving us in the care of Mary Alice Sanders.  Please feel free to call with any questions.    Govind Church MD  12/01/22  11:24 [facility]    [facility]  [phone]    Please note that portions of this note were completed with a voice recognition program.

## 2022-12-01 NOTE — PLAN OF CARE
Goal Outcome Evaluation:  Plan of Care Reviewed With: patient           Outcome Evaluation: Patient seen for clinical swallow assessment. Pt lives with son at baseline. Clinical swallow assessment completed 10/9/21 with recs for nectar and puree. Pt on full liquid diet at home d/t pocketing and meets adequate nutrition per caregiver per RN. Now admitted with COVID. Pt was minimally response to thermal/tactile stim this date. No attempts to manipulate bolus of ice. No swallow elicited. Ice removed. Patient not appropriate for PO at this time. Will continue to follow, patient may require temporary nutrition via cortrak.      Patient was not wearing a face mask during this therapy encounter. Therapist used appropriate personal protective equipment including gown, eye protection, mask and gloves.  Mask used was N95/duckbill. Appropriate PPE was worn during the entire therapy session. Hand hygiene was completed before and after therapy session. Patient is in enhanced droplet precautions.

## 2022-12-01 NOTE — CONSULTS
"Nutrition Services    Patient Name:  Mary Alice Sanders  YOB: 1935  MRN: [id number]  Admit Date:  2022    Comment:Nutrition Consult for Malnutrition Severity Assessment  Family reports pt with increase confusion and dx with COVID 19.  Per son pt was only drinking protein supplements at home due to pocketing food and not eating much of anything. He reports lately she has taken less of these supplements as well. She does have a hx of dementia. Skin issues on admission noted.   Labs Na 165, k 3.4, glu 153, bun 46, phos 2.1, alb 2.9, electrolytes replaced as needed  IVF's at 150  Pt NPO at this time. Speech evaluation done.    Based on ASPEN/AND criteria, patient meets a Nutrition Diagnosis of Severe Malnutrition of Acute Disease based on wt loss and poor po intake.    Will continue to follow clinical course and monitor nutritional needs.     CLINICAL NUTRITION ASSESSMENT      Reason for Assessment BMI, Pressure Injury and/or Non-Healing Wound, Reduced Oral Intake Over The Last Month, Unintentional Weight Loss      Diagnosis/Problem   COVID 19, dementia, hypernatremia, DUTCH, dehydration   Medical/Surgical History Past Medical History:   Diagnosis Date   • Allergic rhinitis    • Chest pain    • GERD (gastroesophageal reflux disease)    • Hx of migraine headaches    • Hx: UTI (urinary tract infection)    • Hypertension    • Varicose veins        Past Surgical History:   Procedure Laterality Date   •  SECTION          Encounter Information        Nutrition History:  Poor po intake, may just took protein supplements due to swallowing/chewimg difficulties per son   Food Preferences:    Supplements: Protein supplement   Factors Affecting Intake: altered mental status, chewing difficulty, decreased appetite, swallow impairment, weakness     Anthropometrics        Current Height  Current Weight  BMI kg/m2 Height: 165.1 cm (65\")  Weight: 54.4 kg (119 lb 14.9 oz) (22 0423)  Body mass index is 19.96 " kg/m².   Adjusted BMI (if applicable)        Admission Weight        Ideal Body Weight (IBW) 57 kg   Adjusted IBW (if applicable)        Usual Body Weight (UBW) 118-156lb   Weight Change/Trend Loss 20-30lb       Weight History Wt Readings from Last 30 Encounters:   12/01/22 0423 54.4 kg (119 lb 14.9 oz)   10/07/21 2341 53.8 kg (118 lb 9.7 oz)   10/20/20 2111 66.1 kg (145 lb 11.6 oz)   10/20/20 1219 61.2 kg (135 lb)   09/18/19 1355 59.1 kg (130 lb 6.4 oz)   04/11/19 1319 61.5 kg (135 lb 9.6 oz)   06/07/18 1402 69.8 kg (153 lb 12.8 oz)   02/26/18 1402 70.3 kg (155 lb)   01/29/18 1429 68 kg (150 lb)   01/15/18 1407 72.6 kg (160 lb)   09/22/17 1329 69.9 kg (154 lb)   07/19/17 1211 68 kg (150 lb)   06/02/17 1308 70.9 kg (156 lb 6.4 oz)   04/03/17 1253 69.4 kg (153 lb)   09/26/16 1302 64.9 kg (143 lb)   08/08/16 1320 67.4 kg (148 lb 9.6 oz)   10/07/15 0904 67.1 kg (147 lb 15.9 oz)   07/21/15 0952 68 kg (150 lb)   06/16/15 0917 67.6 kg (149 lb 0.1 oz)   03/10/15 0936 67.1 kg (147 lb 15.9 oz)   10/15/14 0949 66.2 kg (146 lb 0.2 oz)   08/18/14 0942 66.7 kg (147 lb)   04/18/14 1328 66.4 kg (146 lb 7.9 oz)   02/05/14 1450 69 kg (152 lb 2.2 oz)   11/25/13 1511 67.1 kg (147 lb 15.9 oz)   11/04/13 1250 65.3 kg (144 lb 0.1 oz)   08/16/13 1406 62.6 kg (138 lb 0.8 oz)           --  Tests/Procedures        Tests/Procedures CT scan     Labs       Pertinent Labs    Results from last 7 days   Lab Units 12/01/22  1209 12/01/22  0222 11/30/22  1943 11/30/22  1826 11/30/22  1259   SODIUM mmol/L 165* 171* >180*   < > >180*   POTASSIUM mmol/L 3.4* 3.6 3.8   < > 3.6   CHLORIDE mmol/L 132* >140* >140*   < > >140*   CO2 mmol/L 20.8* 15.0* 19.0*   < > 23.2   BUN mg/dL 46* 60* 70*   < > 68*   CREATININE mg/dL 1.04* 1.16* 1.27*   < > 1.50*   CALCIUM mg/dL 7.7* 8.3* 8.6   < > 8.2*   BILIRUBIN mg/dL  --   --   --   --  1.1   ALK PHOS U/L  --   --   --   --  104   ALT (SGPT) U/L  --   --   --   --  40*   AST (SGOT) U/L  --   --   --   --  24    GLUCOSE mg/dL 153* 119* 157*   < > 133*    < > = values in this interval not displayed.     Results from last 7 days   Lab Units 12/01/22  1209 12/01/22 0222 11/30/22  1943 11/30/22  1826   MAGNESIUM mg/dL  --  3.1*  --  2.7*   PHOSPHORUS mg/dL 2.1* 2.0*   < > 2.5   HEMOGLOBIN g/dL  --  15.2  --   --    HEMATOCRIT %  --  44.7  --   --    WBC 10*3/mm3  --  10.42  --   --    ALBUMIN g/dL 2.90* 3.20*   < > 3.00*    < > = values in this interval not displayed.     Results from last 7 days   Lab Units 12/01/22 0222 11/30/22  1140   PLATELETS 10*3/mm3 73* 101*     COVID19   Date Value Ref Range Status   11/30/2022 Detected (C) Not Detected - Ref. Range Final     Lab Results   Component Value Date    HGBA1C 5.20 03/05/2020          Medications           Scheduled Medications enoxaparin, 30 mg, Subcutaneous, Daily  insulin regular, 0-7 Units, Subcutaneous, Q6H  sodium chloride, 10 mL, Intravenous, Q12H       Infusions dextrose, 150 mL/hr, Last Rate: 150 mL/hr (12/01/22 0735)       PRN Medications •  acetaminophen **OR** acetaminophen  •  dextrose  •  dextrose  •  glucagon (human recombinant)  •  ipratropium-albuterol  •  ondansetron **OR** ondansetron  •  sodium chloride  •  sodium chloride     Physical Findings          Physical Appearance loss of muscle mass, loss of subcutaneous fat, on oxygen therapy, underweight   Oral/Mouth Cavity teeth missing   Edema  no edema   Gastrointestinal last bowel movement:11/30   Skin  pressure injury bilateral sacral spine, thoracic spine   Tubes/Drains none   NFPE Date Completed: 12/1   -    Malnutrition Severity Assessment      Patient meets criteria for : Severe Malnutrition  Malnutrition Type (last 8 hours)     Malnutrition Severity Assessment     Row Name 12/01/22 1441       Malnutrition Severity Assessment    Malnutrition Type Acute Disease or Injury - Related Malnutrition    Row Name 12/01/22 1441       Insufficient Energy Intake     Insufficient Energy Intake Findings Severe     Insufficient Energy Intake  <75% of est. energy requirement for > or equal to 1 month    Row Name 12/01/22 1441       Unintentional Weight Loss     Unintentional Weight Loss Findings Severe    Unintentional Weight Loss  Weight loss greater than 10% in six months    Row Name 12/01/22 1441       Muscle Loss    Hindu Region Severe - deep hollowing/scooping, lack of muscle to touch, facial bones well defined    Clavicle Bone Region Moderate - some protrusion in females, visible in males    Acromion Bone Region Severe - squared shoulders, bones, and acromion process protrusion prominent    Dorsal Hand Region Moderate - slight depression    Row Name 12/01/22 1441       Fat Loss    Orbital Region  Severe - pronounced hollowness/depression, dark circles, loose saggy skin    Upper Arm Region Moderate - some fat tissue, not ample    Row Name 12/01/22 1441       Criteria Met (Must meet criteria for severity in at least 2 of these categories: M Wasting, Fat Loss, Fluid, Secondary Signs, Wt. Status, Intake)    Patient meets criteria for  Severe Malnutrition                 -  Current Nutrition Orders & Evaluation of Intake       Oral Nutrition     Food Allergies eggs   Current PO Diet NPO Diet NPO Type: Strict NPO, Sips with Meds   Supplement n/a   PO Evaluation     % PO Intake     # of Days Evaluated    --  PES STATEMENT / NUTRITION DIAGNOSIS      Nutrition Dx Problem  Problem: Unintentional Weight Loss, Malnutrition, Inadequate Nutrient Intake and Swallowing Difficulty  Etiology: Medical Diagnosis and Factors Affecting Nutritionhypernatremia, dehydration, COVID 19  Signs/Symptoms: NPO, Report of Minimal PO Intake, NFPE Results, BMI, Unintended Weight Change, Report/Observation and SLP/Swallow Evaluation    Comment:    --  NUTRITION INTERVENTION / PLAN OF CARE      Intervention Goal(s) Improved nutrition related labs, Reduce/improve symptoms, Meet estimated needs, Disease management/therapy, Initiate feeding/diet, Tolerate  PO , Increase intake and Appropriate weight gain         RD Intervention/Action Await begin PO diet, Follow Tx Progress and Care plan reviewed     --      Monitor/Evaluation Per protocol, I&O, Pertinent labs, Weight, Skin status, Symptoms, POC/GOC, Swallow function, Hemodynamic stability   Discharge Plan/Needs Pending clinical course   Education Will instruct as appropriate   --    RD to follow per protocol.      Electronically signed by:  Kezia Cerrato RD  12/01/22 14:26 EST

## 2022-12-01 NOTE — PROGRESS NOTES
LOS: 1 day   Patient Care Team:  Gera Barker MD as PCP - General  Gera Barker MD as PCP - Family Medicine    Chief Complaint:  F/up severe hyponatremia, COVID infection and critical care management    Subjective   Interval History  I reviewed the admission note, progress notes, PMH, PSH, Family hx, social history, imagings and prior records on this admission, summarized the finding in my note and formulated a transition of care plan.    On oxygen 5 L/min however the oxygen cannula was in her mouth.  No documented hypoxia in the chart.  Mario SPO2 was 90%.  Patient has dementia and she is confused and unable to provide with history.  No reports of cough per staff.  She is NPO.    REVIEW OF SYSTEMS:   Cannot obtain due to confusion    Ventilator/Non-Invasive Ventilation Settings (From admission, onward)    None                Physical Exam:     Vital Signs  Temp:  [97.8 °F (36.6 °C)-98.7 °F (37.1 °C)] 98.3 °F (36.8 °C)  Heart Rate:  [76-98] 80  Resp:  [10-26] 10  BP: ()/(53-81) 91/65    Intake/Output Summary (Last 24 hours) at 12/1/2022 0932  Last data filed at 12/1/2022 0411  Gross per 24 hour   Intake 2280 ml   Output 0 ml   Net 2280 ml     Flowsheet Rows    Flowsheet Row First Filed Value   Admission Height --   Admission Weight 54.4 kg (119 lb 14.9 oz) Documented at 12/01/2022 0423          PPE used per hospital policy    General Appearance:   Alert, cooperative, in no acute distress.  Cachectic   ENMT:  Mallampati score 2, dry mucous membrane   Eyes:  Pupils equal and reactive to light. EOMI   Neck:    Trachea midline. No thyromegaly.   Lungs:    Clear to auscultation,respirations regular, even and nonlabored    Heart:   Regular rhythm and normal rate, normal S1 and S2, no         murmur   Skin:   No rash or ecchymosis   Abdomen:    Soft. No tenderness. No HSM.   Neuro/psych:  Confused.  Follow commands intermittently.  Moves all extremities    Extremities:  No cyanosis, clubbing or edema.  Warm extremities and well-perfused.  Loss of muscle mass in arms and legs          Results Review:        Results from last 7 days   Lab Units 12/01/22 0222 11/30/22 1943 11/30/22  1826   SODIUM mmol/L 171* >180* 173*   POTASSIUM mmol/L 3.6 3.8 3.5   CHLORIDE mmol/L >140* >140* >140*   CO2 mmol/L 15.0* 19.0* 21.0*   BUN mg/dL 60* 70* 63*   CREATININE mg/dL 1.16* 1.27* 1.25*   GLUCOSE mg/dL 119* 157* 119*   CALCIUM mg/dL 8.3* 8.6 7.5*     Results from last 7 days   Lab Units 11/30/22  1140   TROPONIN T ng/mL 0.028     Results from last 7 days   Lab Units 12/01/22 0222 11/30/22  1140   WBC 10*3/mm3 10.42 11.42*   HEMOGLOBIN g/dL 15.2 17.2*   HEMATOCRIT % 44.7 52.5*   PLATELETS 10*3/mm3 73* 101*                           Results from last 7 days   Lab Units 11/30/22  1329   PH, ARTERIAL pH units 7.458*   PCO2, ARTERIAL mm Hg 30.3*   PO2 ART mm Hg 65.7*   FLOW RATE lpm 4   MODALITY  Cannula   O2 SATURATION CALC % 94.0         I reviewed the patient's new clinical results.        Medication Review:   enoxaparin, 30 mg, Subcutaneous, Daily  insulin regular, 0-7 Units, Subcutaneous, Q6H  sodium chloride, 10 mL, Intravenous, Q12H        dextrose, 150 mL/hr, Last Rate: 150 mL/hr (12/01/22 0735)        Diagnostic imaging:  I personally and independently reviewed the following images:      Assessment     1. DUTCH: Likely secondary to volume depletion  2. Electrolytes disturbance: Severe hypernatremia due to water deficit.  Hypokalemia  3. Hypotension, likely secondary to hypovolemia, resolved  4. Lactic acidosis, secondary to hypotension, resolved  5. COVID-19 URI  6. Hyperchloremic metabolic acidosis  7. Encephalopathy, metabolic.  On top of dementia  8. Severe protein calorie malnutrition      · Dementia  · Immobility syndrome  · DNR/DNI        All problems new to me    Plan     · Supportive treatment for COVID infection.  Her CXR from yesterday showed no pulmonary  infiltrates and therefore she does not require treatment for COVID.  Not sure whether she is hypoxemic and therefore we will try to wean her off oxygen.  · Give bolus normal saline x1 again due to hypotension and lactic acidosis.  Start mike to keep MAP >60.  Repeat lactic acid in a.m.  · D5W 150 mL/H.  Continue slow correction.  Avoid correcting sodium by >10/ 24-hour  · Replace potassium with IV K-Phos  · DVT prophylaxis with Lovenox  · Speech eval.      Artis Alvarez MD  12/01/22  09:32 EST      Time: Critical care 35 min      This note was dictated utilizing [employer] dictation

## 2022-12-01 NOTE — PLAN OF CARE
Goal Outcome Evaluation:      Patient makes no attempt to get out of bed, she is unaware of safety measures in place to  keep her safe.    Patient also does not attempt to shift her weight or reposition self while in bed, she is dependent on staff for repositioning.

## 2022-12-01 NOTE — THERAPY EVALUATION
Acute Care - Speech Language Pathology   Swallow Initial Evaluation Psychiatric     Patient Name: Mary Alice Sanders  : 1935  MRN: [id number]  Today's Date: 2022               Admit Date: 2022    Visit Dx:     ICD-10-CM ICD-9-CM   1. Hypernatremia  E87.0 276.0   2. Altered mental status, unspecified altered mental status type  R41.82 780.97   3. DUTCH (acute kidney injury) (HCC)  N17.9 584.9   4. Lactic acid acidosis  E87.20 276.2     Patient Active Problem List   Diagnosis   • Blues   • Acid reflux   • Blood glucose elevated   • Hypercholesterolemia   • BP (high blood pressure)   • OP (osteoporosis)   • Allergic rhinitis   • Alzheimer's disease (HCC)   • Vitamin B 12 deficiency   • Cystitis   • Bronchitis   • Vitamin D toxicity   • Complicated UTI (urinary tract infection)   • Hypertension   • Decreased responsiveness   • DNR (do not resuscitate)   • Vasovagal episode   • Malnutrition (HCC)   • Age-related physical debility   • Hypernatremia     Past Medical History:   Diagnosis Date   • Allergic rhinitis    • Chest pain    • GERD (gastroesophageal reflux disease)    • Hx of migraine headaches    • Hx: UTI (urinary tract infection)    • Hypertension    • Varicose veins      Past Surgical History:   Procedure Laterality Date   •  SECTION         SLP Recommendation and Plan  SLP Swallowing Diagnosis: oral dysphagia (22)  SLP Diet Recommendation: NPO (22)     SLP Rec. for Method of Medication Administration: meds via alternate route (22)     Monitor for Signs of Aspiration: yes, notify SLP if any concerns (22)  Recommended Diagnostics: reassess via clinical swallow evaluation (22)  Swallow Criteria for Skilled Therapeutic Interventions Met: demonstrates skilled criteria (22)  Anticipated Discharge Disposition (SLP): unknown (22)     Therapy Frequency (Swallow): PRN (22)                                            Plan of Care Reviewed With: patient  Outcome Evaluation: Patient seen for clinical swallow assessment. Pt lives with son at baseline. Clinical swallow assessment completed 10/9/21 with recs for nectar and puree. Pt on full liquid diet at home d/t pocketing and meets adequate nutrition per caregiver per RN. Now admitted with COVID. Pt was minimally response to thermal/tactile stim this date. No attempts to manipulate bolus of ice. No swallow elicited. Ice removed. Patient not appropriate for PO at this time. Will continue to follow, patient may require temporary nutrition via cortrak.      SWALLOW EVALUATION (last 72 hours)     SLP Adult Swallow Evaluation     Row Name 12/01/22 1200                   Rehab Evaluation    Document Type evaluation  -SH        Patient Effort poor  -SH           General Information    Patient Profile Reviewed yes  -SH        Pertinent History Of Current Problem COVID, dementia, BSE 10/9/21 puree/nectar, hypernatremia, DUTCH, hyperglycemia  -SH        Current Method of Nutrition NPO  -SH        Prior Level of Function-Communication cognitive-linguistic impairment  -SH        Prior Level of Function-Swallowing other (see comments)  full liquid diet  -SH           Pain    Additional Documentation Pain Scale: FACES Pre/Post-Treatment (Group)  -SH           Pain Scale: FACES Pre/Post-Treatment    Pain: FACES Scale, Pretreatment 0-->no hurt  -SH           Oral Musculature and Cranial Nerve Assessment    Oral Motor General Assessment unable to assess  -SH           Clinical Swallow Eval    Clinical Swallow Evaluation Summary Patient seen for clinical swallow assessment. Pt lives with son at baseline. Clinical swallow assessment completed 10/9/21 with recs for nectar and puree. Pt on full liquid diet at home d/t pocketing and meets adequate nutrition per caregiver per RN. Now admitted with COVID. Pt was minimally response to thermal/tactile stim this date. No attempts to manipulate bolus of  ice. No swallow elicited. Ice removed. Patient not appropriate for PO at this time. Will continue to follow, patient may require temporary nutrition via cortrak.  -           SLP Evaluation Clinical Impression    SLP Swallowing Diagnosis oral dysphagia  -        Swallow Criteria for Skilled Therapeutic Interventions Met demonstrates skilled criteria  -           Recommendations    Therapy Frequency (Swallow) PRN  -        SLP Diet Recommendation NPO  -        Recommended Diagnostics reassess via clinical swallow evaluation  -        Oral Care Recommendations Oral Care BID/PRN  -        SLP Rec. for Method of Medication Administration meds via alternate route  -        Monitor for Signs of Aspiration yes;notify SLP if any concerns  -        Anticipated Discharge Disposition (SLP) unknown  -           Swallow Goals (SLP)    Swallow LTGs Patient will demonstrate progress toward functional swallow for  -           (LTG) Patient will demonstrate progress toward functional swallow for    Liquid viscosity (Demonstrate progress toward functional swallow) thin liquids  -        [place] (Demonstrate progress towards functional swallow) independently (over 90% accuracy)  -        Time Frame (Demonstrate progress toward functional swallow) 1 week  -              User Key  (r) = Recorded By, (t) = Taken By, (c) = Cosigned By    Initials Name Effective Dates     Bisi Lucia MS CCC-SLP 06/16/21 -                 EDUCATION  The patient has been educated in the following areas:   Dysphagia (Swallowing Impairment).        SLP GOALS     Row Name 12/01/22 1200             (LTG) Patient will demonstrate progress toward functional swallow for    Liquid viscosity (Demonstrate progress toward functional swallow) thin liquids  -      [place] (Demonstrate progress towards functional swallow) independently (over 90% accuracy)  -      Time Frame (Demonstrate progress toward functional swallow) 1  week  -            User Key  (r) = Recorded By, (t) = Taken By, (c) = Cosigned By    Initials Name Provider Type     Bisi Lucia MS CCC-SLP Speech and Language Pathologist                   Time Calculation:    Time Calculation- SLP     Row Name 12/01/22 1213             Time Calculation- SLP    SLP Start Time 1000  -SH      SLP Received On 12/01/22  -         Untimed Charges    30058-ZA Eval Oral Pharyng Swallow Minutes 60  -SH         Total Minutes    Untimed Charges Total Minutes 60  -       Total Minutes 60  -SH            User Key  (r) = Recorded By, (t) = Taken By, (c) = Cosigned By    Initials Name Provider Type     Bisi Lucia MS CCC-SLP Speech and Language Pathologist                Therapy Charges for Today     Code Description Service Date Service Provider Modifiers Qty    [id number]  ST EVAL ORAL PHARYNG SWALLOW 4 12/1/2022 Bisi Lucia MS CCC-SLP GN 1               Bisi Lucia MS CCC-SLP  12/1/2022

## 2022-12-02 ENCOUNTER — APPOINTMENT (OUTPATIENT)
Dept: GENERAL RADIOLOGY | Facility: HOSPITAL | Age: 87
End: 2022-12-02

## 2022-12-02 LAB
ALBUMIN SERPL-MCNC: 2.4 G/DL (ref 3.5–5.2)
ALBUMIN SERPL-MCNC: 2.5 G/DL (ref 3.5–5.2)
ALBUMIN SERPL-MCNC: 2.7 G/DL (ref 3.5–5.2)
ANION GAP SERPL CALCULATED.3IONS-SCNC: 5.9 MMOL/L (ref 5–15)
ANION GAP SERPL CALCULATED.3IONS-SCNC: 8.1 MMOL/L (ref 5–15)
ANION GAP SERPL CALCULATED.3IONS-SCNC: 9.5 MMOL/L (ref 5–15)
BASOPHILS # BLD MANUAL: 0.07 10*3/MM3 (ref 0–0.2)
BASOPHILS NFR BLD MANUAL: 1 % (ref 0–1.5)
BUN SERPL-MCNC: 20 MG/DL (ref 8–23)
BUN SERPL-MCNC: 27 MG/DL (ref 8–23)
BUN SERPL-MCNC: 33 MG/DL (ref 8–23)
BUN/CREAT SERPL: 33.9 (ref 7–25)
BUN/CREAT SERPL: 48.2 (ref 7–25)
BUN/CREAT SERPL: 50.8 (ref 7–25)
CALCIUM SPEC-SCNC: 6.3 MG/DL (ref 8.6–10.5)
CALCIUM SPEC-SCNC: 6.7 MG/DL (ref 8.6–10.5)
CALCIUM SPEC-SCNC: 6.8 MG/DL (ref 8.6–10.5)
CHLORIDE SERPL-SCNC: 118 MMOL/L (ref 98–107)
CHLORIDE SERPL-SCNC: 121 MMOL/L (ref 98–107)
CHLORIDE SERPL-SCNC: 127 MMOL/L (ref 98–107)
CO2 SERPL-SCNC: 19.5 MMOL/L (ref 22–29)
CO2 SERPL-SCNC: 21.9 MMOL/L (ref 22–29)
CO2 SERPL-SCNC: 22.1 MMOL/L (ref 22–29)
CREAT SERPL-MCNC: 0.56 MG/DL (ref 0.57–1)
CREAT SERPL-MCNC: 0.59 MG/DL (ref 0.57–1)
CREAT SERPL-MCNC: 0.65 MG/DL (ref 0.57–1)
DEPRECATED RDW RBC AUTO: 44.7 FL (ref 37–54)
EGFRCR SERPLBLD CKD-EPI 2021: 85.3 ML/MIN/1.73
EGFRCR SERPLBLD CKD-EPI 2021: 87.4 ML/MIN/1.73
EGFRCR SERPLBLD CKD-EPI 2021: 88.5 ML/MIN/1.73
EOSINOPHIL # BLD MANUAL: 0.22 10*3/MM3 (ref 0–0.4)
EOSINOPHIL NFR BLD MANUAL: 3 % (ref 0.3–6.2)
ERYTHROCYTE [DISTWIDTH] IN BLOOD BY AUTOMATED COUNT: 12.6 % (ref 12.3–15.4)
GIANT PLATELETS: NORMAL
GLUCOSE BLDC GLUCOMTR-MCNC: 112 MG/DL (ref 70–130)
GLUCOSE BLDC GLUCOMTR-MCNC: 96 MG/DL (ref 70–130)
GLUCOSE BLDC GLUCOMTR-MCNC: 98 MG/DL (ref 70–130)
GLUCOSE SERPL-MCNC: 117 MG/DL (ref 65–99)
GLUCOSE SERPL-MCNC: 124 MG/DL (ref 65–99)
GLUCOSE SERPL-MCNC: 91 MG/DL (ref 65–99)
HCT VFR BLD AUTO: 32.2 % (ref 34–46.6)
HGB BLD-MCNC: 10.8 G/DL (ref 12–15.9)
LYMPHOCYTES # BLD MANUAL: 1.74 10*3/MM3 (ref 0.7–3.1)
LYMPHOCYTES NFR BLD MANUAL: 6 % (ref 5–12)
MCH RBC QN AUTO: 32.7 PG (ref 26.6–33)
MCHC RBC AUTO-ENTMCNC: 33.5 G/DL (ref 31.5–35.7)
MCV RBC AUTO: 97.6 FL (ref 79–97)
MONOCYTES # BLD: 0.44 10*3/MM3 (ref 0.1–0.9)
NEUTROPHILS # BLD AUTO: 4.79 10*3/MM3 (ref 1.7–7)
NEUTROPHILS NFR BLD MANUAL: 66 % (ref 42.7–76)
PHOSPHATE SERPL-MCNC: 1.7 MG/DL (ref 2.5–4.5)
PHOSPHATE SERPL-MCNC: 2 MG/DL (ref 2.5–4.5)
PHOSPHATE SERPL-MCNC: 3.4 MG/DL (ref 2.5–4.5)
PLATELET # BLD AUTO: 54 10*3/MM3 (ref 140–450)
PMV BLD AUTO: 15 FL (ref 6–12)
POTASSIUM SERPL-SCNC: 3.3 MMOL/L (ref 3.5–5.2)
POTASSIUM SERPL-SCNC: 3.5 MMOL/L (ref 3.5–5.2)
POTASSIUM SERPL-SCNC: 3.5 MMOL/L (ref 3.5–5.2)
RBC # BLD AUTO: 3.3 10*6/MM3 (ref 3.77–5.28)
RBC MORPH BLD: NORMAL
SODIUM SERPL-SCNC: 146 MMOL/L (ref 136–145)
SODIUM SERPL-SCNC: 151 MMOL/L (ref 136–145)
SODIUM SERPL-SCNC: 156 MMOL/L (ref 136–145)
VARIANT LYMPHS NFR BLD MANUAL: 24 % (ref 19.6–45.3)
WBC MORPH BLD: NORMAL
WBC NRBC COR # BLD: 7.26 10*3/MM3 (ref 3.4–10.8)

## 2022-12-02 PROCEDURE — 82962 GLUCOSE BLOOD TEST: CPT

## 2022-12-02 PROCEDURE — 80069 RENAL FUNCTION PANEL: CPT | Performed by: INTERNAL MEDICINE

## 2022-12-02 PROCEDURE — 74018 RADEX ABDOMEN 1 VIEW: CPT

## 2022-12-02 PROCEDURE — 25010000002 ENOXAPARIN PER 10 MG: Performed by: INTERNAL MEDICINE

## 2022-12-02 PROCEDURE — 85007 BL SMEAR W/DIFF WBC COUNT: CPT | Performed by: INTERNAL MEDICINE

## 2022-12-02 PROCEDURE — 85025 COMPLETE CBC W/AUTO DIFF WBC: CPT | Performed by: INTERNAL MEDICINE

## 2022-12-02 RX ADMIN — POTASSIUM PHOSPHATE, MONOBASIC AND POTASSIUM PHOSPHATE, DIBASIC 21 MMOL: 224; 236 INJECTION, SOLUTION, CONCENTRATE INTRAVENOUS at 10:49

## 2022-12-02 RX ADMIN — Medication 10 ML: at 08:03

## 2022-12-02 RX ADMIN — DEXTROSE MONOHYDRATE 100 ML/HR: 50 INJECTION, SOLUTION INTRAVENOUS at 23:15

## 2022-12-02 RX ADMIN — ENOXAPARIN SODIUM 30 MG: 30 INJECTION SUBCUTANEOUS at 08:03

## 2022-12-02 RX ADMIN — DEXTROSE MONOHYDRATE 150 ML/HR: 50 INJECTION, SOLUTION INTRAVENOUS at 04:44

## 2022-12-02 NOTE — PROGRESS NOTES
Discharge Planning Assessment  [facility]     Patient Name: Mary Alice Sanders  MRN: [id number]  Today's Date: 12/2/2022    Admit Date: 11/30/2022    Plan: Home with [facility]   Discharge Needs Assessment     Row Name 12/02/22 1352       Living Environment    People in Home child(sarika), adult    Name(s) of People in Home son Mann    Current Living Arrangements home;condominium    Potentially Unsafe Housing Conditions unable to assess    Primary Care Provided by self    Provides Primary Care For no one    Family Caregiver if Needed child(sarika), adult    Family Caregiver Names Mann [phone]    Quality of Family Relationships supportive    Able to Return to Prior Arrangements yes       Resource/Environmental Concerns    Resource/Environmental Concerns none       Transition Planning    Patient/Family Anticipated Services at Transition home health care               Discharge Plan     Row Name 12/02/22 7691       Plan    Plan Home with [facility]    Plan Comments CCP called out to patient's son Mann  [phone].  CCP role explained Discharge plan discussed.  Pt lives in a town house with her son Mann  Pt is Independent with ADL's.  Pt uses no DME  She has no history of HH or rehab  PLan is Home with [facility]  Elaine will follow              Continued Care and Services - Admitted Since 11/30/2022    Coordination has not been started for this encounter.          Demographic Summary    No documentation.                Functional Status    No documentation.                Psychosocial    No documentation.                Abuse/Neglect    No documentation.                Legal    No documentation.                Substance Abuse    No documentation.                Patient Forms    No documentation.                   Moni Young RN

## 2022-12-02 NOTE — CONSULTS
"Nutrition Services    Patient Name:  Mary Alice Sanders  YOB: 1935  MRN: [id number]  Admit Date:  11/30/2022    PROGRESS NOTE - CLINICAL NUTRITION    Comments: Nutrition Consult for Cortrak placement and TF assessment  Pt remains NPO unsafe for po intake.  Na 156-146 now with IVF's at 100ml/hr  K 3.3, phos 1.7 electrolytes replaced  MSA completed 12/1 for severe malnutrition  NG cortrak placed and to be advanced per xray       Concern for refeeding syndrome, will advance nutrition slowly  Recommend Tf's with Isosource HN at 10ml/hr and water [id number] while on IVF's, will advance slowly to goal rate of 55ml/hr.    Encounter Information         Reason for Encounter TF assessment    Current Issues Altered mental status     Current Nutrition Orders & Evaluation of Intake       Oral Nutrition     Current PO Diet NPO Diet NPO Type: Strict NPO, Sips with Meds   Supplement n/a   PO Evaluation     % PO Intake     # of Days Evaluated     Factors Affecting Intake  altered mental status, decreased appetite, swallow impairment, weakness   --  Anthropometrics          Height    Weight Height: 165.1 cm (65\")  Weight: 53.2 kg (117 lb 4.6 oz) (12/02/22 0515)    BMI kg/m2 Body mass index is 19.52 kg/m².    Weight trend Loss see previous note     Labs        Pertinent Labs Reviewed, listed below     Results from last 7 days   Lab Units 12/02/22  1212 12/02/22  0605 12/02/22  0012 11/30/22  1826 11/30/22  1259   SODIUM mmol/L 146* 151* 156*   < > >180*   POTASSIUM mmol/L 3.5 3.3* 3.5   < > 3.6   CHLORIDE mmol/L 118* 121* 127*   < > >140*   CO2 mmol/L 22.1 21.9* 19.5*   < > 23.2   BUN mg/dL 20 27* 33*   < > 68*   CREATININE mg/dL 0.59 0.56* 0.65   < > 1.50*   CALCIUM mg/dL 6.3* 6.8* 6.7*   < > 8.2*   BILIRUBIN mg/dL  --   --   --   --  1.1   ALK PHOS U/L  --   --   --   --  104   ALT (SGPT) U/L  --   --   --   --  40*   AST (SGOT) U/L  --   --   --   --  24   GLUCOSE mg/dL 91 117* 124*   < > 133*    < > = values in this " "interval not displayed.     Results from last 7 days   Lab Units 12/02/22  1212 12/02/22  0605 12/01/22  1209 12/01/22 0222 11/30/22  1943 11/30/22  1826   MAGNESIUM mg/dL  --   --   --  3.1*  --  2.7*   PHOSPHORUS mg/dL 3.4 1.7*   < > 2.0*   < > 2.5   HEMOGLOBIN g/dL  --  10.8*  --  15.2  --   --    HEMATOCRIT %  --  32.2*  --  44.7  --   --    WBC 10*3/mm3  --  7.26  --  10.42  --   --    ALBUMIN g/dL 2.40* 2.70*   < > 3.20*   < > 3.00*    < > = values in this interval not displayed.     Results from last 7 days   Lab Units 12/02/22  0605 12/01/22 0222 11/30/22  1140   PLATELETS 10*3/mm3 54* 73* 101*     COVID19   Date Value Ref Range Status   11/30/2022 Detected (C) Not Detected - Ref. Range Final     Lab Results   Component Value Date    HGBA1C 5.20 03/05/2020          Medications            Scheduled Medications insulin regular, 0-7 Units, Subcutaneous, Q6H  sodium chloride, 10 mL, Intravenous, Q12H        Infusions dextrose, 100 mL/hr, Last Rate: 100 mL/hr (12/02/22 0928)  phenylephrine, 0.5-3 mcg/kg/min, Last Rate: Stopped (12/01/22 1704)        PRN Medications •  acetaminophen **OR** acetaminophen  •  dextrose  •  dextrose  •  glucagon (human recombinant)  •  ipratropium-albuterol  •  ondansetron **OR** ondansetron  •  sodium chloride  •  sodium chloride     Physical Findings          Physical Appearance confused, loss of muscle mass, loss of subcutaneous fat, on oxygen therapy, underweight   Oral/Mouth Cavity teeth missing   Edema  no edema      Gastrointestinal last bowel movement:11/30   Skin  pressure injury bilateral medial sacral spine   Tubes/Drains Cortrak, NG tube pending xray   NFPE Date Completed: 12/1   -  Estimated/Assessed Needs       Energy Requirements    Height for Calculation  Height: 165.1 cm (65\")   Weight for Calculation 53.2 kg   Method for Estimation  30 kcal/kg   EST Needs (kcal/day) 1596       Protein Requirements    Weight for Calculation 53.2 kg   EST Protein Needs (g/kg) 1.2 " gm/kg   EST Daily Needs (g/day) 63       Fluid Requirements     Method for Estimation 1 mL/kcal    Estimated Needs (mL/day) 1596       Fluid Deficit    Current Na Level (mEq/L)    Desired Na Level (mEq/L)    Estimated Fluid Deficit (L)         -  NUTRITION INTERVENTION / PLAN OF CARE  Intervention Goal         Intervention Goal(s) Reduce/improve symptoms, Meet estimated needs, Disease management/therapy, Initiate TF/PN and Appropriate weight gain     Nutrition Intervention         RD Action Follow Tx Progress, Care plan reviewed and Recommend/ordered: TF's     Nutrition Prescription         Diet Prescription     Supplement Prescription n/a   EN/PN Prescription See below   New Prescription Ordered? Yes   -   Enteral Prescription:     Enteral Route NG    TF Delivery Method Continuous    Enteral Product Isosource HN    Modular     Propofol Rate/Kcal     TF Start Rate (mL/hr) 10    TF Goal Rate (mL/hr) 55    Free Water Flush (mL) 95xot5cd plus IVF's    TF Provision at Goal: 1584kcal, 71gm protein, 1069mL free water + 180mL water flushes         Calories 99% needs met         Protein  100 % needs met         Fluid (mL) 1249 mL total     Prescription Ordered Yes   -  Monitor/Evaluation        Monitor Per protocol, I&O, Pertinent labs, EN delivery/tolerance, Weight, Skin status, GI status, Symptoms, POC/GOC, Swallow function   Discharge Needs Pending clinical course   Education Will instruct as appropriate   --    RD to follow up per protocol.    Electronically signed by:  Kezia Cerrato RD  12/02/22 13:17 EST

## 2022-12-02 NOTE — PLAN OF CARE
Patient makes no attempt to exit bed, or help with repositioning.  Bed alarm remains set to alert staff if patient does attrmpot to move self about in bed.

## 2022-12-02 NOTE — PROGRESS NOTES
LOS: 2 days   Patient Care Team:  Gera Barker MD as PCP - General  Gera Barker MD as PCP - Family Medicine    Chief Complaint:  F/up severe hyponatremia, COVID infection and critical care management    Subjective   Interval History  On 4 L oxygen via nasal cannula with reservoir placed in her mouth.  No reports of desaturation.  No reports of cough.  She is confused and has dementia and unable to provide with history    REVIEW OF SYSTEMS:   Cannot obtain due to confusion    Ventilator/Non-Invasive Ventilation Settings (From admission, onward)    None                Physical Exam:     Vital Signs  Temp:  [97.6 °F (36.4 °C)-98.1 °F (36.7 °C)] 97.6 °F (36.4 °C)  Heart Rate:  [65-92] 75  Resp:  [12-14] 12  BP: ()/() 83/61    Intake/Output Summary (Last 24 hours) at 12/2/2022 1250  Last data filed at 12/2/2022 0444  Gross per 24 hour   Intake 473 ml   Output 500 ml   Net -27 ml     Flowsheet Rows    Flowsheet Row First Filed Value   Admission Height --   Admission Weight 54.4 kg (119 lb 14.9 oz) Documented at 12/01/2022 0423          PPE used per hospital policy    General Appearance:   Alert, not cooperative, in no acute distress.  Cachectic   ENMT:  Mallampati score 2, dry mucous membrane   Eyes:  Pupils equal and reactive to light. EOMI   Neck:    Trachea midline. No thyromegaly.   Lungs:    Clear to auscultation,respirations regular, even and nonlabored    Heart:   Regular rhythm and normal rate, normal S1 and S2, no         murmur   Skin:   No rash or ecchymosis   Abdomen:    Soft. No tenderness. No HSM.   Neuro/psych:  Confused.  Somnolent but wakes to verbal stimulus.  She does not follow commands but grimaces and moans to stimuli.   Extremities:  No cyanosis, clubbing or edema.  Warm extremities and well-perfused.  Loss of muscle mass in arms and legs          Results Review:        Results from last 7 days   Lab Units 12/02/22  0605  12/02/22  0012 12/01/22  1813   SODIUM mmol/L 151* 156* 164*   POTASSIUM mmol/L 3.3* 3.5 4.3   CHLORIDE mmol/L 121* 127* 135*   CO2 mmol/L 21.9* 19.5* 18.9*   BUN mg/dL 27* 33* 43*   CREATININE mg/dL 0.56* 0.65 0.97   GLUCOSE mg/dL 117* 124* 78   CALCIUM mg/dL 6.8* 6.7* 7.3*     Results from last 7 days   Lab Units 11/30/22  1140   TROPONIN T ng/mL 0.028     Results from last 7 days   Lab Units 12/02/22  0605 12/01/22  0222 11/30/22  1140   WBC 10*3/mm3 7.26 10.42 11.42*   HEMOGLOBIN g/dL 10.8* 15.2 17.2*   HEMATOCRIT % 32.2* 44.7 52.5*   PLATELETS 10*3/mm3 54* 73* 101*                           Results from last 7 days   Lab Units 11/30/22  1329   PH, ARTERIAL pH units 7.458*   PCO2, ARTERIAL mm Hg 30.3*   PO2 ART mm Hg 65.7*   FLOW RATE lpm 4   MODALITY  Cannula   O2 SATURATION CALC % 94.0         I reviewed the patient's new clinical results.        Medication Review:   insulin regular, 0-7 Units, Subcutaneous, Q6H  sodium chloride, 10 mL, Intravenous, Q12H        dextrose, 100 mL/hr, Last Rate: 100 mL/hr (12/02/22 0928)  phenylephrine, 0.5-3 mcg/kg/min, Last Rate: Stopped (12/01/22 1704)        Diagnostic imaging:  I personally and independently reviewed the following images:      Assessment     1. DUTCH: Likely secondary to volume depletion  2. Electrolytes disturbance: Severe hypernatremia due to water deficit.  Hypokalemia  3. Hypotension, likely secondary to hypovolemia, resolved  4. Lactic acidosis, secondary to hypotension, resolved  5. COVID-19 URI  6. Hyperchloremic metabolic acidosis  7. Encephalopathy, metabolic.  On top of dementia  8. Severe protein calorie malnutrition  9. Acute thrombocytopenia: Unclear how much hydration has affected her levels.  10. Anemia, unclear chronicity.  Hb normal on presentation but patient was extremely dehydrated        · Dementia  · Immobility syndrome  · DNR/DNI        Plan     · I wean her off oxygen  · Increase D5W to 100 mL/H.  · Replace potassium  · DC Lovenox.   Noted drop in platelets.  Repeat in a.m.  · DVT prophylaxis with SCD  · Insert core track and start feeding.  She is not cooperative with diet.  Family contacted and they do not want her to have a PEG tube.    Disposition: Transfer to telemetry.  Consult medicine team for management of DUTCH, hypernatremia and malnutrition.    Artis Alvarez MD  12/02/22  12:50 EST      Time>35 min face to face and on the lopez >1/2 directed toward counseling and coordination of care      This note was dictated utilizing Dragon dictation

## 2022-12-02 NOTE — NURSING NOTE
Lab called with results and nurse is aware that unit currently has a page out to Nephrology, awaiting return call.

## 2022-12-02 NOTE — PROGRESS NOTES
[facility] Progress Note      Patient Name: Mary Alice Sanders  : 1935  MRN: [id number]  Primary Care Physician:  Gera Barker MD  Date of admission: 2022    Subjective     Interval History:   Chart reviewed  O2 requirements have increased from 2 L/min yest to 4 L/min today  Voiding, but UOP not fully recorded  D5W infusing at 100 mL/h    Review of Systems:   As noted above    Objective     Vitals:   Temp:  [97.7 °F (36.5 °C)-98.1 °F (36.7 °C)] 97.7 °F (36.5 °C)  Heart Rate:  [65-92] 84  Resp:  [12-14] 12  BP: ()/() 125/64  Flow (L/min):  [4-5] 4    Intake/Output Summary (Last 24 hours) at 2022 0815  Last data filed at 2022 0444  Gross per 24 hour   Intake 473 ml   Output 500 ml   Net -27 ml       Physical Exam:    I did not examine the patient to reduce exposure to COVID-19  I reviewed the physical examinations performed today by other providers    Scheduled Meds:     enoxaparin, 30 mg, Subcutaneous, Daily  insulin regular, 0-7 Units, Subcutaneous, Q6H  potassium phosphate, 21 mmol, Intravenous, Once  sodium chloride, 10 mL, Intravenous, Q12H      IV Meds:   dextrose, 100 mL/hr, Last Rate: 150 mL/hr (22 0444)  phenylephrine, 0.5-3 mcg/kg/min, Last Rate: Stopped (22 1704)        Results Reviewed:   I have personally reviewed the results from the time of this admission to 2022 08:15 EST     Results from last 7 days   Lab Units 22  0605 22  0012 22  1813 22  1826 22  1259   SODIUM mmol/L 151* 156* 164*   < > >180*   POTASSIUM mmol/L 3.3* 3.5 4.3   < > 3.6   CHLORIDE mmol/L 121* 127* 135*   < > >140*   CO2 mmol/L 21.9* 19.5* 18.9*   < > 23.2   BUN mg/dL 27* 33* 43*   < > 68*   CREATININE mg/dL 0.56* 0.65 0.97   < > 1.50*   CALCIUM mg/dL 6.8* 6.7* 7.3*   < > 8.2*   BILIRUBIN mg/dL  --   --   --   --  1.1   ALK PHOS U/L  --   --   --   --  104   ALT (SGPT) U/L  --   --   --   --  40*   AST (SGOT) U/L  --   --    --   --  24   GLUCOSE mg/dL 117* 124* 78   < > 133*    < > = values in this interval not displayed.       Estimated Creatinine Clearance: 59.4 mL/min (A) (by C-G formula based on SCr of 0.56 mg/dL (L)).    Results from last 7 days   Lab Units 12/02/22  0605 12/02/22  0012 12/01/22  1813 12/01/22  1209 12/01/22  0222 11/30/22  1943 11/30/22  1826   MAGNESIUM mg/dL  --   --   --   --  3.1*  --  2.7*   PHOSPHORUS mg/dL 1.7* 2.0* 3.2   < > 2.0*   < > 2.5    < > = values in this interval not displayed.             Results from last 7 days   Lab Units 12/02/22  0605 12/01/22 0222 11/30/22  1140   WBC 10*3/mm3 7.26 10.42 11.42*   HEMOGLOBIN g/dL 10.8* 15.2 17.2*   PLATELETS 10*3/mm3 54* 73* 101*             Assessment / Plan     ASSESSMENT:  1.  DUTCH, nonoliguric, resolved:  prerenal due to severe volume depletion and hypotension.  Low phosphorus and potassium.  Admission urinalysis: very concentrated, minimal proteinuria, no cells  2.  Hypernatremia due to severe water deficit, in association with change in mental status: slowly better  3.  Reported confusion superimposed on dementia  4.  Hypotension, likely from hypovolemia  5.  COVID infection  6.  Immobility syndrome with bedsores    PLAN:  1.  Continue D5W at 100 mL/h, though will transition to enteral water when/if feeding tube placed  2.  Potassium phosphate IV  3.  Surveillance lab    Thank you for involving us in the care of Mary Alice Sanders.  Please feel free to call with any questions.    Govind Church MD  12/02/22  08:15 [facility]    [facility]  [phone]    Please note that portions of this note were completed with a voice recognition program.

## 2022-12-03 ENCOUNTER — APPOINTMENT (OUTPATIENT)
Dept: GENERAL RADIOLOGY | Facility: HOSPITAL | Age: 87
End: 2022-12-03

## 2022-12-03 LAB
ALBUMIN SERPL-MCNC: 2.2 G/DL (ref 3.5–5.2)
ANION GAP SERPL CALCULATED.3IONS-SCNC: 11 MMOL/L (ref 5–15)
BASOPHILS # BLD AUTO: 0 10*3/MM3 (ref 0–0.2)
BASOPHILS NFR BLD AUTO: 0 % (ref 0–1.5)
BUN SERPL-MCNC: 15 MG/DL (ref 8–23)
BUN/CREAT SERPL: 31.9 (ref 7–25)
CALCIUM SPEC-SCNC: 6.3 MG/DL (ref 8.6–10.5)
CHLORIDE SERPL-SCNC: 112 MMOL/L (ref 98–107)
CO2 SERPL-SCNC: 19 MMOL/L (ref 22–29)
CREAT SERPL-MCNC: 0.47 MG/DL (ref 0.57–1)
DEPRECATED RDW RBC AUTO: 43.6 FL (ref 37–54)
EGFRCR SERPLBLD CKD-EPI 2021: 92.3 ML/MIN/1.73
EOSINOPHIL # BLD AUTO: 0.08 10*3/MM3 (ref 0–0.4)
EOSINOPHIL NFR BLD AUTO: 1.6 % (ref 0.3–6.2)
ERYTHROCYTE [DISTWIDTH] IN BLOOD BY AUTOMATED COUNT: 12.7 % (ref 12.3–15.4)
GLUCOSE BLDC GLUCOMTR-MCNC: 106 MG/DL (ref 70–130)
GLUCOSE BLDC GLUCOMTR-MCNC: 107 MG/DL (ref 70–130)
GLUCOSE BLDC GLUCOMTR-MCNC: 111 MG/DL (ref 70–130)
GLUCOSE BLDC GLUCOMTR-MCNC: 117 MG/DL (ref 70–130)
GLUCOSE SERPL-MCNC: 96 MG/DL (ref 65–99)
HCT VFR BLD AUTO: 29.3 % (ref 34–46.6)
HGB BLD-MCNC: 9.9 G/DL (ref 12–15.9)
LDH SERPL-CCNC: 300 U/L (ref 135–214)
LYMPHOCYTES # BLD AUTO: 1.19 10*3/MM3 (ref 0.7–3.1)
LYMPHOCYTES NFR BLD AUTO: 23.9 % (ref 19.6–45.3)
MAGNESIUM SERPL-MCNC: 1.9 MG/DL (ref 1.6–2.4)
MCH RBC QN AUTO: 31.9 PG (ref 26.6–33)
MCHC RBC AUTO-ENTMCNC: 33.8 G/DL (ref 31.5–35.7)
MCV RBC AUTO: 94.5 FL (ref 79–97)
MONOCYTES # BLD AUTO: 0.24 10*3/MM3 (ref 0.1–0.9)
MONOCYTES NFR BLD AUTO: 4.8 % (ref 5–12)
NEUTROPHILS NFR BLD AUTO: 3.42 10*3/MM3 (ref 1.7–7)
NEUTROPHILS NFR BLD AUTO: 68.9 % (ref 42.7–76)
PHOSPHATE SERPL-MCNC: 1.7 MG/DL (ref 2.5–4.5)
PLATELET # BLD AUTO: 48 10*3/MM3 (ref 140–450)
POTASSIUM SERPL-SCNC: 3.1 MMOL/L (ref 3.5–5.2)
RBC # BLD AUTO: 3.1 10*6/MM3 (ref 3.77–5.28)
SODIUM SERPL-SCNC: 142 MMOL/L (ref 136–145)
WBC NRBC COR # BLD: 4.97 10*3/MM3 (ref 3.4–10.8)

## 2022-12-03 PROCEDURE — 80069 RENAL FUNCTION PANEL: CPT | Performed by: INTERNAL MEDICINE

## 2022-12-03 PROCEDURE — 85025 COMPLETE CBC W/AUTO DIFF WBC: CPT | Performed by: INTERNAL MEDICINE

## 2022-12-03 PROCEDURE — 82962 GLUCOSE BLOOD TEST: CPT

## 2022-12-03 PROCEDURE — 83615 LACTATE (LD) (LDH) ENZYME: CPT | Performed by: HOSPITALIST

## 2022-12-03 PROCEDURE — 83735 ASSAY OF MAGNESIUM: CPT | Performed by: INTERNAL MEDICINE

## 2022-12-03 PROCEDURE — 71045 X-RAY EXAM CHEST 1 VIEW: CPT

## 2022-12-03 PROCEDURE — 99223 1ST HOSP IP/OBS HIGH 75: CPT | Performed by: INTERNAL MEDICINE

## 2022-12-03 PROCEDURE — 0 POTASSIUM CHLORIDE 10 MEQ/100ML SOLUTION: Performed by: HOSPITALIST

## 2022-12-03 PROCEDURE — 74018 RADEX ABDOMEN 1 VIEW: CPT

## 2022-12-03 RX ORDER — MIDODRINE HYDROCHLORIDE 2.5 MG/1
2.5 TABLET ORAL
Status: DISCONTINUED | OUTPATIENT
Start: 2022-12-03 | End: 2022-12-03

## 2022-12-03 RX ORDER — FUROSEMIDE 10 MG/ML
20 INJECTION INTRAMUSCULAR; INTRAVENOUS ONCE
Status: DISCONTINUED | OUTPATIENT
Start: 2022-12-03 | End: 2022-12-03

## 2022-12-03 RX ORDER — POTASSIUM CHLORIDE 7.45 MG/ML
10 INJECTION INTRAVENOUS
Status: DISPENSED | OUTPATIENT
Start: 2022-12-03 | End: 2022-12-03

## 2022-12-03 RX ORDER — DEXTROSE MONOHYDRATE 50 MG/ML
75 INJECTION, SOLUTION INTRAVENOUS CONTINUOUS
Status: DISCONTINUED | OUTPATIENT
Start: 2022-12-03 | End: 2022-12-05

## 2022-12-03 RX ORDER — POTASSIUM CHLORIDE 750 MG/1
40 TABLET, FILM COATED, EXTENDED RELEASE ORAL ONCE
Status: DISCONTINUED | OUTPATIENT
Start: 2022-12-03 | End: 2022-12-03

## 2022-12-03 RX ADMIN — DEXTROSE MONOHYDRATE 75 ML/HR: 50 INJECTION, SOLUTION INTRAVENOUS at 10:00

## 2022-12-03 RX ADMIN — POTASSIUM CHLORIDE 10 MEQ: 7.46 INJECTION, SOLUTION INTRAVENOUS at 10:24

## 2022-12-03 RX ADMIN — POTASSIUM CHLORIDE 10 MEQ: 7.46 INJECTION, SOLUTION INTRAVENOUS at 13:52

## 2022-12-03 RX ADMIN — POTASSIUM CHLORIDE 10 MEQ: 7.46 INJECTION, SOLUTION INTRAVENOUS at 17:02

## 2022-12-03 RX ADMIN — Medication 10 ML: at 10:24

## 2022-12-03 NOTE — PROGRESS NOTES
[facility] Progress Note      Patient Name: Mary Alice Sanders  : 1935  MRN: [id number]  Primary Care Physician:  Gera Barker MD  Date of admission: 2022    Subjective     Interval History:   Confused this morning  Urine output of 950 cc last 24 hours  Review of Systems:   As noted above    Objective     Vitals:   Temp:  [96.7 °F (35.9 °C)-98.7 °F (37.1 °C)] 98.2 °F (36.8 °C)  Heart Rate:  [70-95] 95  Resp:  [16-18] 18  BP: ()/(25-92) 100/52  Flow (L/min):  [4] 4    Intake/Output Summary (Last 24 hours) at 12/3/2022 0901  Last data filed at 12/3/2022 0719  Gross per 24 hour   Intake --   Output 950 ml   Net -950 ml       Physical Exam:    General: Ill looking, on high flow oxygen  HEENT: Anicteric sclera, conjunctiva injected, no sinus tenderness, oral mucosa moist.  Neck: supple, no thyromegaly and trachea is midline. No JVD.  Heart: Regular rate and rhythm no murmur S1-S2 normal.  Lungs: Clear to auscultation bilaterally,  no wheezes and  no crackles. Nonlabored.  Abdomen:  soft, not distended, positive bowel sounds.  Nontender, no rebound tenderness.  No  CVA tenderness   Genitourinary: Deferred  Extremities no lower extremity edema  Neurologic  examination: Alert, oriented.  Moves all extremities    Scheduled Meds:     insulin regular, 0-7 Units, Subcutaneous, Q6H  sodium chloride, 10 mL, Intravenous, Q12H      IV Meds:   dextrose, 100 mL/hr, Last Rate: 100 mL/hr (22 2315)        Results Reviewed:   I have personally reviewed the results from the time of this admission to 12/3/2022 09:01 EST     Results from last 7 days   Lab Units 22  0413 22  1212 22  0605 22  1826 22  1259   SODIUM mmol/L 142 146* 151*   < > >180*   POTASSIUM mmol/L 3.1* 3.5 3.3*   < > 3.6   CHLORIDE mmol/L 112* 118* 121*   < > >140*   CO2 mmol/L 19.0* 22.1 21.9*   < > 23.2   BUN mg/dL 15 20 27*   < > 68*   CREATININE mg/dL 0.47* 0.59 0.56*   < > 1.50*    CALCIUM mg/dL 6.3* 6.3* 6.8*   < > 8.2*   BILIRUBIN mg/dL  --   --   --   --  1.1   ALK PHOS U/L  --   --   --   --  104   ALT (SGPT) U/L  --   --   --   --  40*   AST (SGOT) U/L  --   --   --   --  24   GLUCOSE mg/dL 96 91 117*   < > 133*    < > = values in this interval not displayed.       Estimated Creatinine Clearance: 78.4 mL/min (A) (by C-G formula based on SCr of 0.47 mg/dL (L)).    Results from last 7 days   Lab Units 12/03/22 0413 12/02/22  1212 12/02/22  0605 12/01/22  1209 12/01/22 0222 11/30/22  1943 11/30/22  1826   MAGNESIUM mg/dL 1.9  --   --   --  3.1*  --  2.7*   PHOSPHORUS mg/dL 1.7* 3.4 1.7*   < > 2.0*   < > 2.5    < > = values in this interval not displayed.             Results from last 7 days   Lab Units 12/03/22 0413 12/02/22  0605 12/01/22 0222 11/30/22  1140   WBC 10*3/mm3 4.97 7.26 10.42 11.42*   HEMOGLOBIN g/dL 9.9* 10.8* 15.2 17.2*   PLATELETS 10*3/mm3 48* 54* 73* 101*             Assessment / Plan     ASSESSMENT:  1.  DUTCH, nonoliguric, resolved:  prerenal due to severe volume depletion and hypotension.  Low phosphorus and potassium.  Admission urinalysis: very concentrated, minimal proteinuria, no cells  2.  Hypernatremia due to severe water deficit, in association with change in mental status: slowly better  3.  Reported confusion superimposed on dementia  4.  Hypotension, likely from hypovolemia  5.  COVID infection  6.  Immobility syndrome with bedsores    PLAN:  We will decrease D5 water to 75 cc an hour  Continue current therapy the time being  Surveillance labs    Thank you for involving us in the care of Mary Alice Sanders.  Please feel free to call with any questions.    Jeremy Stone MD  12/03/22  09:01 [facility]    [facility]  [phone]    Please note that portions of this note were completed with a voice recognition program.

## 2022-12-03 NOTE — NURSING NOTE
Pt had cortrak placed 12/2 by nutrition prior to transferring to this unit. The charting on this is confusing, as the nutrition note was started at 13:17 but nothing was charted in LDAs.... KUB was performed at 13:29 which indicated the tube should be advanced. The LDA was then entered into the flowsheet at 15:49 and indicated the tube was secured at 65 cm. Unsure if 65 cm was the level before or after the advancement.   No KUB was repeated if the tube was advanced.   Pt arrived to this unit around 17:15. I assumed care of this patient at 19:00. Upon my initial assessment, tube feeds had not been started on this patient. I attempted to irrigate the tube, but was unsuccessful. I further attempted to clear the tube with carbonated beverage, again unsuccessfully.   I then reviewed the charting and found the above.   I ordered a repeat KUB to determine the location of the tube.   Once this was read, it stated that the tube was kinked.   I then attempted to reposition the tube again.   Upon removing the anchor, it appeared that the tube was actually secured around the 67-68 cm robel. Pulled back to 65 cm and still unable to irrigate. Pulled back further to 63 cm and still unable to irrigate. At this time, I decided to remove the tube altogether. Upon doing so, I discovered that the tube was indeed deeply kinked at the 11.5 cm robel.     Reordered nutrition consult to have the tube replaced in AM.

## 2022-12-03 NOTE — PLAN OF CARE
VSS, BP improved once fluids restarted, afebrile, oxygen titrated to room air. Cortrak removed due to being kinked in stomach. Tube feeds not started this shift. Reconsulted nutrition for AM.   Pt verbalizes somewhat, nothing comprehensible. Good urine output once fluids restarted. No BM.   Turned q2h. Continue current plan of care.       Problem: Fall Injury Risk  Goal: Absence of Fall and Fall-Related Injury  Outcome: Ongoing, Progressing  Intervention: Identify and Manage Contributors  Recent Flowsheet Documentation  Taken 12/3/2022 0400 by Yash Vásquez RN  Medication Review/Management: medications reviewed  Taken 12/3/2022 0200 by Yash Vásquez RN  Medication Review/Management: medications reviewed  Taken 12/3/2022 0000 by Yash Vásquez RN  Medication Review/Management: medications reviewed  Taken 12/2/2022 2200 by Yash Vásquez RN  Medication Review/Management: medications reviewed  Taken 12/2/2022 2000 by Yash Vásquez RN  Medication Review/Management: medications reviewed  Intervention: Promote Injury-Free Environment  Recent Flowsheet Documentation  Taken 12/3/2022 0400 by Yash Vásquez RN  Safety Promotion/Fall Prevention: safety round/check completed  Taken 12/3/2022 0200 by Yash Vásquez RN  Safety Promotion/Fall Prevention: safety round/check completed  Taken 12/3/2022 0000 by Yash Vásquez RN  Safety Promotion/Fall Prevention: safety round/check completed  Taken 12/2/2022 2200 by Yash Vásquez RN  Safety Promotion/Fall Prevention: safety round/check completed  Taken 12/2/2022 2000 by Yash Vásquez RN  Safety Promotion/Fall Prevention: safety round/check completed     Problem: Skin Injury Risk Increased  Goal: Skin Health and Integrity  Outcome: Ongoing, Progressing  Intervention: Optimize Skin Protection  Recent Flowsheet Documentation  Taken 12/3/2022 0400 by Yash Vásquez RN  Head of Bed (HOB) Positioning: HOB elevated  Taken 12/2/2022  2200 by Yash Vásquez RN  Head of Bed (HOB) Positioning: HOB elevated  Taken 12/2/2022 2000 by Yash Vásquez RN  Head of Bed (HOB) Positioning: HOB elevated     Problem: Adult Inpatient Plan of Care  Goal: Plan of Care Review  Outcome: Ongoing, Progressing  Goal: Patient-Specific Goal (Individualized)  Outcome: Ongoing, Progressing  Goal: Absence of Hospital-Acquired Illness or Injury  Outcome: Ongoing, Progressing  Intervention: Identify and Manage Fall Risk  Recent Flowsheet Documentation  Taken 12/3/2022 0400 by Yash Vásquez RN  Safety Promotion/Fall Prevention: safety round/check completed  Taken 12/3/2022 0200 by Yash Vásquez RN  Safety Promotion/Fall Prevention: safety round/check completed  Taken 12/3/2022 0000 by Yash Vásquez RN  Safety Promotion/Fall Prevention: safety round/check completed  Taken 12/2/2022 2200 by Yash Vásquez RN  Safety Promotion/Fall Prevention: safety round/check completed  Taken 12/2/2022 2000 by Yash Vásquez RN  Safety Promotion/Fall Prevention: safety round/check completed  Intervention: Prevent Skin Injury  Recent Flowsheet Documentation  Taken 12/3/2022 0400 by Yash Vásquez RN  Body Position:   weight shifting   turned   right   side-lying  Taken 12/3/2022 0200 by Yash Vásquez RN  Body Position: weight shifting  Taken 12/3/2022 0000 by Yash Vásquez RN  Body Position:   weight shifting   turned   left   side-lying  Taken 12/2/2022 2200 by Yash Vásquez RN  Body Position:   weight shifting   turned   right   side-lying  Taken 12/2/2022 2000 by Yash Vásquez RN  Body Position:   weight shifting   turned   supine, legs elevated  Intervention: Prevent and Manage VTE (Venous Thromboembolism) Risk  Recent Flowsheet Documentation  Taken 12/3/2022 0400 by Yash Vásquez RN  Activity Management: activity adjusted per tolerance  Taken 12/3/2022 0200 by Yash Vásquez RN  Activity Management: activity  adjusted per tolerance  Taken 12/3/2022 0000 by Yash Vásquez, RN  Activity Management: activity adjusted per tolerance  Taken 12/2/2022 2200 by Yash Vásquez, RN  Activity Management: activity adjusted per tolerance  Taken 12/2/2022 2000 by Yash Vásquez, RN  Activity Management: activity adjusted per tolerance  VTE Prevention/Management: (Lovenox) other (see comments)  Goal: Optimal Comfort and Wellbeing  Outcome: Ongoing, Progressing  Goal: Readiness for Transition of Care  Outcome: Ongoing, Progressing   Goal Outcome Evaluation:

## 2022-12-03 NOTE — PLAN OF CARE
Goal Outcome Evaluation:  Plan of Care Reviewed With: patient        Progress: no change  Outcome Evaluation: Patient transfer with diagnosis of covid positive; with Cortrack in place  for feeding oxygen nasal cannula, start puriwick patient non-verbal

## 2022-12-03 NOTE — CONSULTS
[facility] INITIAL INPATIENT CONSULTATION NOTE    REASON FOR CONSULTATION:    Thrombocytopenia    HISTORY OF PRESENT ILLNESS:  Mary Alice Sandesr is a 87 y.o. female who we are asked to see today in consultation for the above issue.    The patient has past medical history significant for hypertension, urinary tract infections, GERD, migraine headaches.  She has underlying dementia.  Patient lives  at home with her son.  She had developed confusion for a few days at home.  She was diagnosed with urinary tract infection a few weeks prior to admission.  On admission 2022, lactate was 4.4, sodium greater than 180, chloride greater than 140 with creatinine 1.5, BUN 68.  Patient was positive for COVID-19.  Severe hyponatremia was felt to be related to severe water deficit, managed by nephrology with hydration producing gradual improvement and normalization.  Acute kidney injury resolved as well with hydration.  Patient had no other manifestations of COVID-19 infection and has not required specific therapy.  Patient is DNR/DNI.    Previous labs available prior to admission from 10/8/2021 with WBC 5.78, hemoglobin 12, platelet count 177,000 with normal differential on the white count.  On admission 2022, WBC 11.42 with hemoglobin 17.2, hematocrit 52.5, platelet count 101,000.  Patient presented with severe volume deficit producing hyponatremia and acute kidney injury as well as the elevated WBC and hematocrit.  With hydration, counts have declined.  Today, patient with WBC 4.97, normal differential, hemoglobin 9.9, MCV 94.5, platelet count 48,000.         Past Medical History:   Diagnosis Date   • Allergic rhinitis    • Chest pain    • GERD (gastroesophageal reflux disease)    • Hx of migraine headaches    • Hx: UTI (urinary tract infection)    • Hypertension    • Varicose veins        Past Surgical History:   Procedure Laterality Date   •  SECTION         SOCIAL HISTORY:   reports that she has  never smoked. She has never used smokeless tobacco. Drug use questions deferred to the physician. She reports that she does not drink alcohol.    FAMILY HISTORY:  family history includes Cancer in her daughter; Coronary artery disease in her mother.    ALLERGIES:  Allergies   Allergen Reactions   • Eggs Or Egg-Derived Products Other (See Comments)     uknown       MEDICATIONS:  As listed in the electronic medical record.    Review of Systems   Unable to obtain as patient is nonverbal    Vitals:    12/03/22 0719 12/03/22 1316 12/03/22 1356 12/03/22 1359   BP: 100/52 (!) 85/54 (!) 89/69 (!) 89/69   BP Location: Left arm Left arm Right arm Left arm   Patient Position: Lying Lying Lying Lying   Pulse: 95 84     Resp: 18 18     Temp: 98.2 °F (36.8 °C) 98.9 °F (37.2 °C)     TempSrc: Oral Oral     SpO2: 94% 94%     Weight:       Height:           Physical Exam  Constitutional:       Comments: Patient opens eyes, is nonverbal, no distress   Eyes:      Conjunctiva/sclera: Conjunctivae normal.   Neck:      Thyroid: No thyromegaly.   Cardiovascular:      Rate and Rhythm: Normal rate and regular rhythm.      Heart sounds: No murmur heard.    No friction rub. No gallop.   Pulmonary:      Effort: No respiratory distress.      Breath sounds: Normal breath sounds.   Abdominal:      General: Bowel sounds are normal. There is no distension.      Palpations: Abdomen is soft.      Tenderness: There is no abdominal tenderness.   Lymphadenopathy:      Head:      Right side of head: No submandibular adenopathy.      Cervical: No cervical adenopathy.      Upper Body:      Right upper body: No supraclavicular adenopathy.      Left upper body: No supraclavicular adenopathy.   Skin:     General: Skin is warm and dry.      Findings: No rash.   Neurological:      Cranial Nerves: No cranial nerve deficit.      Motor: No abnormal muscle tone.      Deep Tendon Reflexes: Reflexes normal.         DIAGNOSTIC DATA:    Results from last 7 days   Lab  Units 12/03/22  0413 12/02/22  0605 12/01/22  0222   WBC 10*3/mm3 4.97 7.26 10.42   HEMOGLOBIN g/dL 9.9* 10.8* 15.2   HEMATOCRIT % 29.3* 32.2* 44.7   PLATELETS 10*3/mm3 48* 54* 73*      Results from last 7 days   Lab Units 12/03/22  0413 12/02/22  1212 12/02/22  0605 11/30/22  1826 11/30/22  1259   SODIUM mmol/L 142 146* 151*   < > >180*   POTASSIUM mmol/L 3.1* 3.5 3.3*   < > 3.6   CHLORIDE mmol/L 112* 118* 121*   < > >140*   CO2 mmol/L 19.0* 22.1 21.9*   < > 23.2   BUN mg/dL 15 20 27*   < > 68*   CREATININE mg/dL 0.47* 0.59 0.56*   < > 1.50*   CALCIUM mg/dL 6.3* 6.3* 6.8*   < > 8.2*   BILIRUBIN mg/dL  --   --   --   --  1.1   ALK PHOS U/L  --   --   --   --  104   ALT (SGPT) U/L  --   --   --   --  40*   AST (SGOT) U/L  --   --   --   --  24   GLUCOSE mg/dL 96 91 117*   < > 133*    < > = values in this interval not displayed.          Assessment & Plan   ASSESSMENT:  This is a 87 y.o. female with:    *Severe hyponatremia  • Sodium on admission 11/30/2022 was greater than 180 in the setting of severe dehydration  • Nephrology managing, with hydration, sodium normalized    *DUTCH  • Previous normal creatinine  • On admission creatinine 1.5 11/30/2022 in the setting of severe dehydration  • Creatinine is normalized with hydration    *COVID-19 infection  • Identified on admission 11/30/2022  • Patient has had no specific manifestations of COVID-19 infection and no specific treatment has been administered.    *Thrombocytopenia  · Previous labs available prior to admission from 10/8/2021 with WBC 5.78, hemoglobin 12, platelet count 177,000 with normal differential on the white count.    · On admission 11/30/2022, WBC 11.42 with hemoglobin 17.2, hematocrit 52.5, platelet count 101,000.    · Patient presented with severe volume deficit producing hyponatremia and acute kidney injury as well as the elevated WBC and hematocrit and likely falsely elevated platelet count.  With hydration, counts have declined.    · Today, patient  with WBC 4.97, normal differential, hemoglobin 9.9, MCV 94.5, platelet count 48,000.  · Unclear what her platelet count has been over the past year prior to admission.  This may be purely related to COVID-19 infection.  Agree with checking routine labs including B12, folate, IPF in a.m.    *Normocytic anemia  · Previous labs available prior to admission from 10/8/2021 with hemoglobin 12  · On admission 11/30/2022, hemoglobin 17.2, hematocrit 52.5.    · Patient presented with severe volume deficit producing hyponatremia and acute kidney injury as well as the elevated hematocrit.  With hydration, counts have declined.    · Today, patient with hemoglobin 9.9, MCV 94.5  · Elevated hematocrit on admission secondary to volume depletion/dehydration and hemoconcentration.  Her current anemia may be related to chronic disease.  Agree with checking routine labs in a.m. including iron panel, ferritin, B12, folate and we will add ESR/CRP.  There has been no clinical evidence of GI blood loss, will check stool for occult blood.    *CODE STATUS  • Patient is DNR/DNI    PLAN:   1. Additional laboratory evaluation with iron panel, ferritin, B12, folate, IPF, reticulocyte count, ESR, CRP  2. Check stool for occult blood  3. CBC, CMP in AM.          Shahab Alcantara MD

## 2022-12-03 NOTE — CONSULTS
Nutrition Services    Patient Name:  Mary Alice Sanders  YOB: 1935  MRN: [id number]  Admit Date:  11/30/2022    Nutrition consult received re: Cortrak tube being kinked in stomach per the KUB done/read last night at 11:47 pm. The tube has now been pulled completely out, as RN unable to release the kink and flush the tube.     Discussed with the RN today (Aoksua Rondon). Advised no Cortrak-trained RD available today to place another NG tube. DHT should be placed by nursing and verify gastric placement by KUB before starting TF.     RN mentioned checking with ICU & CCU staff to see if any RN-trained Cortrak super user available.     TF orders in place: Isosource HN at 10 mL/hr, advance by 10 mL Q 24 hours to goal rate of 55 mL/hr. Water flush 30 mL Q 4 hours. Monitor labs for refeeding syndrome as family had previously indicated pt was eating poorly and only drinking some Ensure (met criteria for severe malnutrition).     RD to continue to follow.

## 2022-12-03 NOTE — CONSULTS
CONSULT NOTE    INTERNAL MEDICINE   [facility]       Patient Identification:  Name: Mary Alice Sanders  Age: 87 y.o.  Sex: female  :  1935  MRN: [id number]             Date of Consultation:  12/3/2022        Primary Care Physician: Gera Barker MD                               Requesting Physician: Opal Stone MD  Reason for Consultation: Assume care.    Chief Complaint: Patient nonverbal.    History of Present Illness:   Pleasant 87-year-old female with advanced senile dementia admitted to the ICU team on 2022 with severe dehydration with sodium 180 associated with DUTCH and multiple electrolyte abnormalities.  Patient.  ICU has been aggressively hydrated and electrolyte abnormalities addressed with assistance of nephrology.  Presently stable to the point of being able to transfer to telemetry.  Attempted NG tube was made but unfortunately the position was poor and I am told it was kinked.  It is now been removed.  Patient is nonverbal.  Extensive records reviewed.      Past Medical History:  Past Medical History:   Diagnosis Date   • Allergic rhinitis    • Chest pain    • GERD (gastroesophageal reflux disease)    • Hx of migraine headaches    • Hx: UTI (urinary tract infection)    • Hypertension    • Varicose veins      Past Surgical History:  Past Surgical History:   Procedure Laterality Date   •  SECTION        Home Meds:  No medications prior to admission.     Current Meds:   [unfilled]  Allergies:  Allergies   Allergen Reactions   • Eggs Or Egg-Derived Products Other (See Comments)     uknown     Social History:   Social History     Tobacco Use   • Smoking status: Never   • Smokeless tobacco: Never   Substance Use Topics   • Alcohol use: No      Family History:  Family History   Problem Relation Age of Onset   • Coronary artery disease Mother    • Cancer Daughter           Review of Systems  Review of Systems   Unable to perform ROS: Dementia         Vitals:   /52 (BP  "Location: Left arm, Patient Position: Lying)   Pulse 95   Temp 98.2 °F (36.8 °C) (Oral)   Resp 18   Ht 165.1 cm (65\")   Wt 58.9 kg (129 lb 13.6 oz)   SpO2 94%   BMI 21.61 kg/m²   I/O:     Intake/Output Summary (Last 24 hours) at 12/3/2022 1026  Last data filed at 12/3/2022 0719  Gross per 24 hour   Intake --   Output 950 ml   Net -950 ml       Exam:  Physical Exam  Constitutional:       Comments: Awake but nonverbal.  Thin, frail.  Does not appear in distress.   HENT:      Head: Atraumatic.      Right Ear: External ear normal.      Left Ear: External ear normal.      Nose: Nose normal.      Mouth/Throat:      Mouth: Mucous membranes are dry.   Eyes:      General: No scleral icterus.        Right eye: No discharge.         Left eye: No discharge.      Extraocular Movements: Extraocular movements intact.      Conjunctiva/sclera: Conjunctivae normal.   Cardiovascular:      Rate and Rhythm: Normal rate and regular rhythm.      Comments: Peripheral pulses 1+ throughout.  Pulmonary:      Effort: Pulmonary effort is normal. No respiratory distress.      Breath sounds: No stridor. Rales present. No wheezing or rhonchi.      Comments: Dry fibrotic rales bilaterally.  Chest:      Chest wall: No tenderness.   Abdominal:      General: Abdomen is flat. Bowel sounds are normal. There is no distension.      Palpations: Abdomen is soft. There is no mass.      Tenderness: There is no abdominal tenderness. There is no guarding or rebound.   Musculoskeletal:      Cervical back: Neck supple.      Right lower leg: No edema.      Left lower leg: No edema.   Skin:     General: Skin is warm and dry.   Neurological:      Comments: Patient is nonverbal.  Moderate eye contact.  Requires assistance to cooperate with the exam.  Towards the end of the encounter the patient spontaneously and briefly laughs.     Psychiatric:      Comments: See neurologic evaluation         Data Review:  Labs in chart were reviewed.    Assessment:  Severe " dehydration: Markedly improved.  Creatinine back to normal but clinically she still appears dry.  DUTCH: Secondary to above.  Resolved.  Nephrology input greatly appreciated.  Hypernatremia- severe: Again secondary to severe dehydration.  Corrected.  Nephrology input greatly appreciated.  Hypokalemia/hypophosphatemia: Both also being managed by nephrology.  Input appreciated.  Potassium down again today.  Hypotension: Likely secondary to low intravascular volume.  Baseline not known but reportedly a history of high blood pressure.  Continue hydration.  Severe malnutrition: Speech therapy, nutrition evaluation.  COVID-19 positive: No clinical indication of active disease.  Anemia: Hemoconcentration on presentation.  Now with low hemoglobin.  Check iron panels, B12 etc.  Thrombocytopenia: thrombocytopenia with platelet count of 101 on presentation.  Now down to 48.  Check immature platelet fraction, B12....  Hematology consultation.  History of vitamin D toxicity: Last level on record was still high.  We will recheck.  DNR:    All problems new to this examiner    Plan:  Discussed with RN.    Tony Gregory MD   12/3/2022  10:26 EST    EMR Dragon/Transcription disclaimer:   Much of this encounter note is an electronic transcription/translation of spoken language to printed text. The electronic translation of spoken language may permit erroneous, or at times, nonsensical words or phrases to be inadvertently transcribed; Although I have reviewed the note for such errors, some may still exist.

## 2022-12-03 NOTE — PLAN OF CARE
Goal Outcome Evaluation:              Outcome Evaluation: Attempted swallow re-evaluation at bedside; however, patient was lethargic and declined PO intake multiple times. Per RN, nutrition consulted to re-place Cortrak. ST to follow-up when appropriate.

## 2022-12-03 NOTE — NURSING NOTE
1600    osmar gallagher tube tried x 3 between two nurses. kub report noted. Tube pulled out after kub results. osmar gallagher held for now. Continue to monitor. Dr. rivera made aware and okay to leave the tube out for now.    Akosua MONSIVAIS

## 2022-12-04 LAB
25(OH)D3 SERPL-MCNC: 48 NG/ML (ref 30–100)
ALBUMIN SERPL-MCNC: 2.7 G/DL (ref 3.5–5.2)
ALBUMIN/GLOB SERPL: 1.1 G/DL
ALP SERPL-CCNC: 94 U/L (ref 39–117)
ALT SERPL W P-5'-P-CCNC: 42 U/L (ref 1–33)
ANION GAP SERPL CALCULATED.3IONS-SCNC: 9.7 MMOL/L (ref 5–15)
AST SERPL-CCNC: 55 U/L (ref 1–32)
BASOPHILS # BLD AUTO: 0.01 10*3/MM3 (ref 0–0.2)
BASOPHILS NFR BLD AUTO: 0.2 % (ref 0–1.5)
BILIRUB SERPL-MCNC: 1 MG/DL (ref 0–1.2)
BUN SERPL-MCNC: 7 MG/DL (ref 8–23)
BUN/CREAT SERPL: 14 (ref 7–25)
CALCIUM SPEC-SCNC: 7.1 MG/DL (ref 8.6–10.5)
CHLORIDE SERPL-SCNC: 110 MMOL/L (ref 98–107)
CO2 SERPL-SCNC: 20.3 MMOL/L (ref 22–29)
CREAT SERPL-MCNC: 0.5 MG/DL (ref 0.57–1)
CRP SERPL-MCNC: 3.06 MG/DL (ref 0–0.5)
D-LACTATE SERPL-SCNC: 1.4 MMOL/L (ref 0.5–2)
DEPRECATED RDW RBC AUTO: 43.9 FL (ref 37–54)
EGFRCR SERPLBLD CKD-EPI 2021: 90.9 ML/MIN/1.73
EOSINOPHIL # BLD AUTO: 0.06 10*3/MM3 (ref 0–0.4)
EOSINOPHIL NFR BLD AUTO: 1.1 % (ref 0.3–6.2)
ERYTHROCYTE [DISTWIDTH] IN BLOOD BY AUTOMATED COUNT: 13 % (ref 12.3–15.4)
ERYTHROCYTE [SEDIMENTATION RATE] IN BLOOD: 26 MM/HR (ref 0–30)
FERRITIN SERPL-MCNC: 445 NG/ML (ref 13–150)
FOLATE SERPL-MCNC: 14.6 NG/ML (ref 4.78–24.2)
GLOBULIN UR ELPH-MCNC: 2.5 GM/DL
GLUCOSE BLDC GLUCOMTR-MCNC: 108 MG/DL (ref 70–130)
GLUCOSE BLDC GLUCOMTR-MCNC: 122 MG/DL (ref 70–130)
GLUCOSE BLDC GLUCOMTR-MCNC: 123 MG/DL (ref 70–130)
GLUCOSE SERPL-MCNC: 96 MG/DL (ref 65–99)
HCT VFR BLD AUTO: 32.1 % (ref 34–46.6)
HGB BLD-MCNC: 11.1 G/DL (ref 12–15.9)
IMM GRANULOCYTES # BLD AUTO: 0.07 10*3/MM3 (ref 0–0.05)
IMM GRANULOCYTES NFR BLD AUTO: 1.3 % (ref 0–0.5)
IRON 24H UR-MRATE: 39 MCG/DL (ref 37–145)
IRON SATN MFR SERPL: 26 % (ref 20–50)
LYMPHOCYTES # BLD AUTO: 1.28 10*3/MM3 (ref 0.7–3.1)
LYMPHOCYTES NFR BLD AUTO: 23.5 % (ref 19.6–45.3)
MCH RBC QN AUTO: 32.6 PG (ref 26.6–33)
MCHC RBC AUTO-ENTMCNC: 34.6 G/DL (ref 31.5–35.7)
MCV RBC AUTO: 94.1 FL (ref 79–97)
MONOCYTES # BLD AUTO: 0.38 10*3/MM3 (ref 0.1–0.9)
MONOCYTES NFR BLD AUTO: 7 % (ref 5–12)
NEUTROPHILS NFR BLD AUTO: 3.65 10*3/MM3 (ref 1.7–7)
NEUTROPHILS NFR BLD AUTO: 66.9 % (ref 42.7–76)
NRBC BLD AUTO-RTO: 0 /100 WBC (ref 0–0.2)
PLATELET # BLD AUTO: 53 10*3/MM3 (ref 140–450)
PLATELET # BLD AUTO: 53 10*3/MM3 (ref 140–450)
PLATELETS.RETICULATED NFR BLD AUTO: 27 % (ref 0.9–6.5)
POTASSIUM SERPL-SCNC: 3.3 MMOL/L (ref 3.5–5.2)
PROT SERPL-MCNC: 5.2 G/DL (ref 6–8.5)
RBC # BLD AUTO: 3.41 10*6/MM3 (ref 3.77–5.28)
RETICS # AUTO: 0.05 10*6/MM3 (ref 0.02–0.13)
RETICS/RBC NFR AUTO: 1.61 % (ref 0.7–1.9)
SODIUM SERPL-SCNC: 140 MMOL/L (ref 136–145)
TIBC SERPL-MCNC: 152 MCG/DL (ref 298–536)
TRANSFERRIN SERPL-MCNC: 102 MG/DL (ref 200–360)
VIT B12 BLD-MCNC: 1652 PG/ML (ref 211–946)
WBC NRBC COR # BLD: 5.45 10*3/MM3 (ref 3.4–10.8)

## 2022-12-04 PROCEDURE — 82784 ASSAY IGA/IGD/IGG/IGM EACH: CPT | Performed by: INTERNAL MEDICINE

## 2022-12-04 PROCEDURE — 82728 ASSAY OF FERRITIN: CPT | Performed by: INTERNAL MEDICINE

## 2022-12-04 PROCEDURE — 85045 AUTOMATED RETICULOCYTE COUNT: CPT | Performed by: HOSPITALIST

## 2022-12-04 PROCEDURE — 99232 SBSQ HOSP IP/OBS MODERATE 35: CPT | Performed by: INTERNAL MEDICINE

## 2022-12-04 PROCEDURE — 86140 C-REACTIVE PROTEIN: CPT | Performed by: INTERNAL MEDICINE

## 2022-12-04 PROCEDURE — 84466 ASSAY OF TRANSFERRIN: CPT | Performed by: HOSPITALIST

## 2022-12-04 PROCEDURE — 82607 VITAMIN B-12: CPT | Performed by: HOSPITALIST

## 2022-12-04 PROCEDURE — 83540 ASSAY OF IRON: CPT | Performed by: HOSPITALIST

## 2022-12-04 PROCEDURE — 85055 RETICULATED PLATELET ASSAY: CPT | Performed by: INTERNAL MEDICINE

## 2022-12-04 PROCEDURE — 86334 IMMUNOFIX E-PHORESIS SERUM: CPT | Performed by: INTERNAL MEDICINE

## 2022-12-04 PROCEDURE — 84165 PROTEIN E-PHORESIS SERUM: CPT | Performed by: INTERNAL MEDICINE

## 2022-12-04 PROCEDURE — 85025 COMPLETE CBC W/AUTO DIFF WBC: CPT | Performed by: INTERNAL MEDICINE

## 2022-12-04 PROCEDURE — 83605 ASSAY OF LACTIC ACID: CPT | Performed by: HOSPITALIST

## 2022-12-04 PROCEDURE — 82962 GLUCOSE BLOOD TEST: CPT

## 2022-12-04 PROCEDURE — 82746 ASSAY OF FOLIC ACID SERUM: CPT | Performed by: HOSPITALIST

## 2022-12-04 PROCEDURE — 80053 COMPREHEN METABOLIC PANEL: CPT | Performed by: HOSPITALIST

## 2022-12-04 PROCEDURE — 85652 RBC SED RATE AUTOMATED: CPT | Performed by: INTERNAL MEDICINE

## 2022-12-04 PROCEDURE — 82306 VITAMIN D 25 HYDROXY: CPT | Performed by: HOSPITALIST

## 2022-12-04 RX ADMIN — DEXTROSE MONOHYDRATE 75 ML/HR: 50 INJECTION, SOLUTION INTRAVENOUS at 14:28

## 2022-12-04 NOTE — PROGRESS NOTES
[facility] GROUP INPATIENT PROGRESS NOTE    Length of Stay:  4 days    CHIEF COMPLAINT:  Severe hyponatremia, DUTCH, anemia, thrombocytopenia    SUBJECTIVE:   Patient somnolent, will open eyes, nonverbal    ROS:  Review of Systems   Unable to obtain as patient is nonverbal    OBJECTIVE:  Vitals:    12/03/22 2024 12/04/22 0040 12/04/22 0515 12/04/22 0756   BP: (!) 84/69 107/98  120/84   BP Location: Right arm Right arm  Left arm   Patient Position: Lying Lying  Lying   Pulse: 97 101  86   Resp: 20 20  20   Temp: 97.5 °F (36.4 °C) 97.3 °F (36.3 °C)  98.6 °F (37 °C)   TempSrc: Oral Oral  Oral   SpO2: 96% 96%  96%   Weight:   51.5 kg (113 lb 9.6 oz)    Height:             PHYSICAL EXAMINATION:  General: Patient will open eyes, somnolent, nonverbal  Chest/Lungs: Clear to auscultation bilaterally anteriorly  Heart: Regular rate and rhythm  Abdomen/GI: Soft nontender nondistended bowel sounds present  Extremities: No edema    DIAGNOSTIC DATA:  Results Review:     I reviewed the patient's new clinical results.    Results from last 7 days   Lab Units 12/04/22  0400 12/03/22  0413 12/02/22  0605   WBC 10*3/mm3 5.45 4.97 7.26   HEMOGLOBIN g/dL 11.1* 9.9* 10.8*   HEMATOCRIT % 32.1* 29.3* 32.2*   PLATELETS 10*3/mm3 53*  53* 48* 54*      Results from last 7 days   Lab Units 12/04/22  0400 12/03/22  0413 12/02/22  1212 11/30/22  1826 11/30/22  1259   SODIUM mmol/L 140 142 146*   < > >180*   POTASSIUM mmol/L 3.3* 3.1* 3.5   < > 3.6   CHLORIDE mmol/L 110* 112* 118*   < > >140*   CO2 mmol/L 20.3* 19.0* 22.1   < > 23.2   BUN mg/dL 7* 15 20   < > 68*   CREATININE mg/dL 0.50* 0.47* 0.59   < > 1.50*   CALCIUM mg/dL 7.1* 6.3* 6.3*   < > 8.2*   BILIRUBIN mg/dL 1.0  --   --   --  1.1   ALK PHOS U/L 94  --   --   --  104   ALT (SGPT) U/L 42*  --   --   --  40*   AST (SGOT) U/L 55*  --   --   --  24   GLUCOSE mg/dL 96 96 91   < > 133*    < > = values in this interval not displayed.      Lab Results   Component Value Date    NEUTROABS  3.65 12/04/2022         Results from last 7 days   Lab Units 12/03/22  0413   MAGNESIUM mg/dL 1.9         Assessment & Plan   ASSESSMENT/PLAN:  This is a 87 y.o. female with:     *Severe hyponatremia  • Sodium on admission 11/30/2022 was greater than 180 in the setting of severe dehydration  • Nephrology managing, with hydration, sodium normalized     *DUTCH  • Previous normal creatinine  • On admission creatinine 1.5 11/30/2022 in the setting of severe dehydration  • Creatinine is normalized with hydration     *COVID-19 infection  • Identified on admission 11/30/2022  • Patient has had no specific manifestations of COVID-19 infection and no specific treatment has been administered.     *Thrombocytopenia  • Previous labs available prior to admission from 10/8/2021 with WBC 5.78, hemoglobin 12, platelet count 177,000 with normal differential on the white count.    • On admission 11/30/2022, WBC 11.42 with hemoglobin 17.2, hematocrit 52.5, platelet count 101,000.    • Patient presented with severe volume deficit producing hyponatremia and acute kidney injury as well as the elevated WBC and hematocrit and likely falsely elevated platelet count.  With hydration, counts have declined.    • Today, patient with WBC 4.97, normal differential, hemoglobin 9.9, MCV 94.5, platelet count 48,000.  • Unclear what her platelet count has been over the past year prior to admission.  This may be purely related to COVID-19 infection.    • Labs on 12/4/2022 with B12 1652, folate 14.6.  • IPF elevated at 27% on 12/4/2022 indicating marrow response from peripheral destructive process.  Unclear whether patient may have ITP or possibly virally mediated thrombocytopenia related to COVID-19.  • Platelet count today has improved slightly at 53,000.  We will monitor for now and consider need for further evaluation pending count over the next few days.  With coexisting anemia, will check serum protein electrophoresis, immunoelectrophoresis,  quantitative immunoglobulins.     *Normocytic anemia  • Previous labs available prior to admission from 10/8/2021 with hemoglobin 12  • On admission 11/30/2022, hemoglobin 17.2, hematocrit 52.5.    • Patient presented with severe volume deficit producing hyponatremia and acute kidney injury as well as the elevated hematocrit.  With hydration, counts have declined.    • There has been no clinical evidence of GI blood loss, stool for occult blood pending  • Additional labs on 12/4/2022 with iron 39, ferritin 445, iron saturation 26%, TIBC 152, folate 14.6, B12 1652.  • Labs consistent with anemia secondary to chronic disease.  Given the concurrent thrombocytopenia we will also check serum protein electrophoresis, immunoelectrophoresis, quantitative immunoglobulins.  If however this was abnormal, would likely not pursue additional evaluation given her underlying dementia.    *Elevated LFTs  · Previous LFTs were normal in 2021  · On admission 11/30/2022, ALT 40, AST 24, total bilirubin 1.1.  · Today, ALT 42, AST 55, total bilirubin 1.0.  May be related to COVID-19 infection.    *CODE STATUS  • Patient is DNR/DNI     PLAN:   1. Stool for occult blood pending  2. Check serum protein electrophoresis, immunoelectrophoresis, quantitative immunoglobulins, free serum light chains.  3. Daily CBC, CMP                     Shahab Alcantara MD

## 2022-12-04 NOTE — PROGRESS NOTES
"DAILY PROGRESS NOTE  [facility]    Patient Identification:  Name: Mary Alice Sanders  Age: 87 y.o.  Sex: female  :  1935  MRN: [id number]         Primary Care Physician: Gera Barker MD      Subjective  Patient is not communicating aside from a slight shrug of the shoulder once when asked how she was doing.    Objective:  General Appearance:  In no acute distress and not in pain (Thin, frail, chronically ill-appearing).    Vital signs: (most recent): Blood pressure 120/84, pulse 86, temperature 98.6 °F (37 °C), temperature source Oral, resp. rate 20, height 165.1 cm (65\"), weight 51.5 kg (113 lb 9.6 oz), SpO2 96 %.    Lungs:  Normal effort and normal respiratory rate.  Breath sounds clear to auscultation.    Heart: Normal rate.  Regular rhythm.    Extremities: There is no dependent edema.    Neurological: (Sleeping on entering the room.  Does awaken briefly with moderate eye contact.).    Skin:  Warm and dry.                Vital signs in last 24 hours:  Temp:  [97.3 °F (36.3 °C)-98.9 °F (37.2 °C)] 98.6 °F (37 °C)  Heart Rate:  [] 86  Resp:  [18-20] 20  BP: ()/(54-98) 120/84    Intake/Output:    Intake/Output Summary (Last 24 hours) at 2022 1159  Last data filed at 2022 0550  Gross per 24 hour   Intake --   Output 1850 ml   Net -1850 ml         Results from last 7 days   Lab Units 22  0400 22  0413 22  0605 22  0222 22  1140   WBC 10*3/mm3 5.45 4.97 7.26 10.42 11.42*   HEMOGLOBIN g/dL 11.1* 9.9* 10.8* 15.2 17.2*   PLATELETS 10*3/mm3 53*  53* 48* 54* 73* 101*     Results from last 7 days   Lab Units 22  0400 22  0413 22  1212 22  0605 22  0012 22  1813 22  1209   SODIUM mmol/L 140 142 146* 151* 156* 164* 165*   POTASSIUM mmol/L 3.3* 3.1* 3.5 3.3* 3.5 4.3 3.4*   CHLORIDE mmol/L 110* 112* 118* 121* 127* 135* 132*   CO2 mmol/L 20.3* 19.0* 22.1 21.9* 19.5* 18.9* 20.8*   BUN mg/dL 7* 15 20 27* 33* 43* " 46*   CREATININE mg/dL 0.50* 0.47* 0.59 0.56* 0.65 0.97 1.04*   GLUCOSE mg/dL 96 96 91 117* 124* 78 153*   Estimated Creatinine Clearance: 64.4 mL/min (A) (by C-G formula based on SCr of 0.5 mg/dL (L)).  Results from last 7 days   Lab Units 12/04/22 0400 12/03/22 0413 12/02/22 1212 12/02/22  0605 12/02/22  0012 12/01/22 1813 12/01/22  1209 12/01/22  0222 11/30/22  1943 11/30/22  1826   CALCIUM mg/dL 7.1* 6.3* 6.3* 6.8* 6.7* 7.3* 7.7* 8.3*   < > 7.5*   ALBUMIN g/dL 2.70* 2.20* 2.40* 2.70* 2.50* 2.80* 2.90* 3.20*   < > 3.00*   MAGNESIUM mg/dL  --  1.9  --   --   --   --   --  3.1*  --  2.7*   PHOSPHORUS mg/dL  --  1.7* 3.4 1.7* 2.0* 3.2 2.1* 2.0*   < > 2.5    < > = values in this interval not displayed.     Results from last 7 days   Lab Units 12/04/22 0400 12/03/22 0413 12/02/22 1212 12/02/22  0605 12/02/22 0012 12/01/22 1813 12/01/22  1209 11/30/22  1826 11/30/22  1259   ALBUMIN g/dL 2.70* 2.20* 2.40* 2.70* 2.50* 2.80* 2.90*   < > 3.30*   BILIRUBIN mg/dL 1.0  --   --   --   --   --   --   --  1.1   ALK PHOS U/L 94  --   --   --   --   --   --   --  104   AST (SGOT) U/L 55*  --   --   --   --   --   --   --  24   ALT (SGPT) U/L 42*  --   --   --   --   --   --   --  40*    < > = values in this interval not displayed.       Assessment:  Severe dehydration: Markedly improved.  Creatinine back to normal.  Clinically appears dry yesterday.  Appears more euvolemic today  DUTCH: Secondary to above.  Resolved.  Nephrology input greatly appreciated.  Hypernatremia- severe: Again secondary to severe dehydration.  Corrected.  Nephrology input greatly appreciated.  Hypokalemia/hypophosphatemia: Both also being managed by nephrology.  Input appreciated.  Potassium down again today.  Hypotension: Likely secondary to low intravascular volume.  Baseline not known but reportedly a history of high blood pressure.  Continue hydration.  Severe malnutrition: Speech therapy, nutrition evaluation.  Unable to successfully place an  NG feeding tube this weekend.  Hopefully reevaluate for placement with speech therapy tomorrow.  COVID-19 positive: No clinical indication of active disease.  Anemia: Hemoconcentration on presentation.  Now with low hemoglobin.    Improving again today.  Iron panel consistent with anemia of chronic disease.  B12 and folate are normal   Thrombocytopenia:  Beginning to improve again today.  IPF is elevated indicating either consumption or recovery.  Suspect the latter.  B12, folate etc. as above.  Hematology input appreciated.  History of vitamin D toxicity:  Resolved.  DNR:      Plan:  Please see above.  Discussed with RN.  Discussed with hematology.    Tony Gregory MD  12/4/2022  11:59 EST

## 2022-12-04 NOTE — PROGRESS NOTES
[facility] Progress Note      Patient Name: Mary Alice Sanders  : 1935  MRN: [id number]  Primary Care Physician:  Gera Barker MD  Date of admission: 2022    Subjective     Interval History:   No issues noted per nursing staff overnight.  Denies any nausea or vomiting.  No fever no chills.  Weak and fatigued.      Review of Systems:   As noted above    Objective     Vitals:   Temp:  [97.3 °F (36.3 °C)-98.9 °F (37.2 °C)] 98.6 °F (37 °C)  Heart Rate:  [] 86  Resp:  [18-20] 20  BP: ()/(54-98) 120/84    Intake/Output Summary (Last 24 hours) at 2022 0914  Last data filed at 2022 0550  Gross per 24 hour   Intake --   Output 1850 ml   Net -1850 ml       Physical Exam:    General: Ill looking, on high flow oxygen  HEENT: Anicteric sclera, conjunctiva injected, no sinus tenderness, oral mucosa moist.  Neck: supple, no thyromegaly and trachea is midline. No JVD.  Heart: Regular rate and rhythm no murmur S1-S2 normal.  Lungs: Clear to auscultation bilaterally,  no wheezes and  no crackles. Nonlabored.  Abdomen:  soft, not distended, positive bowel sounds.  Nontender, no rebound tenderness.  No  CVA tenderness   Genitourinary: Deferred  Extremities no lower extremity edema  Neurologic  examination: Alert, oriented.  Moves all extremities    Scheduled Meds:     insulin regular, 0-7 Units, Subcutaneous, Q6H  potassium phosphate, 15 mmol, Intravenous, Once  sodium chloride, 10 mL, Intravenous, Q12H      IV Meds:   dextrose, 75 mL/hr, Last Rate: 75 mL/hr (22 1000)        Results Reviewed:   I have personally reviewed the results from the time of this admission to 2022 09:14 EST     Results from last 7 days   Lab Units 22  0400 22  0413 22  1212 22  1826 22  1259   SODIUM mmol/L 140 142 146*   < > >180*   POTASSIUM mmol/L 3.3* 3.1* 3.5   < > 3.6   CHLORIDE mmol/L 110* 112* 118*   < > >140*   CO2 mmol/L 20.3* 19.0* 22.1   < >  23.2   BUN mg/dL 7* 15 20   < > 68*   CREATININE mg/dL 0.50* 0.47* 0.59   < > 1.50*   CALCIUM mg/dL 7.1* 6.3* 6.3*   < > 8.2*   BILIRUBIN mg/dL 1.0  --   --   --  1.1   ALK PHOS U/L 94  --   --   --  104   ALT (SGPT) U/L 42*  --   --   --  40*   AST (SGOT) U/L 55*  --   --   --  24   GLUCOSE mg/dL 96 96 91   < > 133*    < > = values in this interval not displayed.       Estimated Creatinine Clearance: 64.4 mL/min (A) (by C-G formula based on SCr of 0.5 mg/dL (L)).    Results from last 7 days   Lab Units 12/03/22  0413 12/02/22  1212 12/02/22  0605 12/01/22  1209 12/01/22  0222 11/30/22  1943 11/30/22  1826   MAGNESIUM mg/dL 1.9  --   --   --  3.1*  --  2.7*   PHOSPHORUS mg/dL 1.7* 3.4 1.7*   < > 2.0*   < > 2.5    < > = values in this interval not displayed.             Results from last 7 days   Lab Units 12/04/22  0400 12/03/22  0413 12/02/22  0605 12/01/22  0222 11/30/22  1140   WBC 10*3/mm3 5.45 4.97 7.26 10.42 11.42*   HEMOGLOBIN g/dL 11.1* 9.9* 10.8* 15.2 17.2*   PLATELETS 10*3/mm3 53*  53* 48* 54* 73* 101*             Assessment / Plan     ASSESSMENT:  1.  DUTCH, nonoliguric, resolved:  prerenal due to severe volume depletion and hypotension.  Low phosphorus and potassium.  Admission urinalysis: very concentrated, minimal proteinuria, no cells  2.  Hypernatremia due to severe water deficit, in association with change in mental status: slowly better  3.  Reported confusion superimposed on dementia  4.  Hypotension, likely from hypovolemia  5.  COVID infection  6.  Immobility syndrome with bedsores    PLAN:  We will continue D5 water at 75 cc an hour for  Continue surveillance labs.      Thank you for involving us in the care of Mary Alice Sanders.  Please feel free to call with any questions.    Jeremy Stone MD  12/04/22  09:14 [facility]    [facility]  [phone]    Please note that portions of this note were completed with a voice recognition program.

## 2022-12-05 LAB
ALBUMIN SERPL-MCNC: 2.7 G/DL (ref 3.5–5.2)
ANION GAP SERPL CALCULATED.3IONS-SCNC: 11.1 MMOL/L (ref 5–15)
BACTERIA SPEC AEROBE CULT: NORMAL
BACTERIA SPEC AEROBE CULT: NORMAL
BASOPHILS # BLD AUTO: 0.02 10*3/MM3 (ref 0–0.2)
BASOPHILS NFR BLD AUTO: 0.3 % (ref 0–1.5)
BUN SERPL-MCNC: 5 MG/DL (ref 8–23)
BUN/CREAT SERPL: 9.6 (ref 7–25)
CALCIUM SPEC-SCNC: 7.4 MG/DL (ref 8.6–10.5)
CHLORIDE SERPL-SCNC: 105 MMOL/L (ref 98–107)
CO2 SERPL-SCNC: 20.9 MMOL/L (ref 22–29)
CREAT SERPL-MCNC: 0.52 MG/DL (ref 0.57–1)
DEPRECATED RDW RBC AUTO: 44.6 FL (ref 37–54)
EGFRCR SERPLBLD CKD-EPI 2021: 90.1 ML/MIN/1.73
EOSINOPHIL # BLD AUTO: 0.07 10*3/MM3 (ref 0–0.4)
EOSINOPHIL NFR BLD AUTO: 1.1 % (ref 0.3–6.2)
ERYTHROCYTE [DISTWIDTH] IN BLOOD BY AUTOMATED COUNT: 13.1 % (ref 12.3–15.4)
GLUCOSE BLDC GLUCOMTR-MCNC: 100 MG/DL (ref 70–130)
GLUCOSE BLDC GLUCOMTR-MCNC: 101 MG/DL (ref 70–130)
GLUCOSE BLDC GLUCOMTR-MCNC: 114 MG/DL (ref 70–130)
GLUCOSE BLDC GLUCOMTR-MCNC: 125 MG/DL (ref 70–130)
GLUCOSE SERPL-MCNC: 99 MG/DL (ref 65–99)
HCT VFR BLD AUTO: 34.1 % (ref 34–46.6)
HGB BLD-MCNC: 11.6 G/DL (ref 12–15.9)
LYMPHOCYTES # BLD AUTO: 1.25 10*3/MM3 (ref 0.7–3.1)
LYMPHOCYTES NFR BLD AUTO: 20.1 % (ref 19.6–45.3)
MCH RBC QN AUTO: 32.1 PG (ref 26.6–33)
MCHC RBC AUTO-ENTMCNC: 34 G/DL (ref 31.5–35.7)
MCV RBC AUTO: 94.5 FL (ref 79–97)
MONOCYTES # BLD AUTO: 0.53 10*3/MM3 (ref 0.1–0.9)
MONOCYTES NFR BLD AUTO: 8.5 % (ref 5–12)
NEUTROPHILS NFR BLD AUTO: 4.24 10*3/MM3 (ref 1.7–7)
NEUTROPHILS NFR BLD AUTO: 68.2 % (ref 42.7–76)
PHOSPHATE SERPL-MCNC: 1.8 MG/DL (ref 2.5–4.5)
PLATELET # BLD AUTO: 66 10*3/MM3 (ref 140–450)
PMV BLD AUTO: 14.3 FL (ref 6–12)
POTASSIUM SERPL-SCNC: 2.8 MMOL/L (ref 3.5–5.2)
RBC # BLD AUTO: 3.61 10*6/MM3 (ref 3.77–5.28)
SODIUM SERPL-SCNC: 137 MMOL/L (ref 136–145)
WBC NRBC COR # BLD: 6.22 10*3/MM3 (ref 3.4–10.8)

## 2022-12-05 PROCEDURE — 82962 GLUCOSE BLOOD TEST: CPT

## 2022-12-05 PROCEDURE — 80069 RENAL FUNCTION PANEL: CPT | Performed by: HOSPITALIST

## 2022-12-05 PROCEDURE — 97530 THERAPEUTIC ACTIVITIES: CPT

## 2022-12-05 PROCEDURE — 0 POTASSIUM CHLORIDE 10 MEQ/100ML SOLUTION: Performed by: HOSPITALIST

## 2022-12-05 PROCEDURE — 85025 COMPLETE CBC W/AUTO DIFF WBC: CPT | Performed by: INTERNAL MEDICINE

## 2022-12-05 PROCEDURE — 97166 OT EVAL MOD COMPLEX 45 MIN: CPT

## 2022-12-05 PROCEDURE — 97162 PT EVAL MOD COMPLEX 30 MIN: CPT

## 2022-12-05 PROCEDURE — 99231 SBSQ HOSP IP/OBS SF/LOW 25: CPT | Performed by: INTERNAL MEDICINE

## 2022-12-05 RX ORDER — POTASSIUM CHLORIDE 750 MG/1
40 TABLET, FILM COATED, EXTENDED RELEASE ORAL
Status: DISPENSED | OUTPATIENT
Start: 2022-12-05 | End: 2022-12-05

## 2022-12-05 RX ORDER — POTASSIUM CHLORIDE 7.45 MG/ML
10 INJECTION INTRAVENOUS
Status: DISCONTINUED | OUTPATIENT
Start: 2022-12-05 | End: 2022-12-15 | Stop reason: HOSPADM

## 2022-12-05 RX ORDER — SODIUM CHLORIDE 9 MG/ML
50 INJECTION, SOLUTION INTRAVENOUS CONTINUOUS
Status: DISCONTINUED | OUTPATIENT
Start: 2022-12-05 | End: 2022-12-14

## 2022-12-05 RX ADMIN — Medication 10 ML: at 09:06

## 2022-12-05 RX ADMIN — POTASSIUM CHLORIDE 10 MEQ: 7.46 INJECTION, SOLUTION INTRAVENOUS at 16:51

## 2022-12-05 RX ADMIN — POTASSIUM CHLORIDE 10 MEQ: 7.46 INJECTION, SOLUTION INTRAVENOUS at 19:27

## 2022-12-05 RX ADMIN — POTASSIUM PHOSPHATE, MONOBASIC AND POTASSIUM PHOSPHATE, DIBASIC 9 MMOL: 224; 236 INJECTION, SOLUTION, CONCENTRATE INTRAVENOUS at 14:38

## 2022-12-05 RX ADMIN — POTASSIUM CHLORIDE 10 MEQ: 7.46 INJECTION, SOLUTION INTRAVENOUS at 21:55

## 2022-12-05 RX ADMIN — POTASSIUM CHLORIDE 10 MEQ: 7.46 INJECTION, SOLUTION INTRAVENOUS at 18:24

## 2022-12-05 RX ADMIN — SODIUM CHLORIDE 50 ML/HR: 9 INJECTION, SOLUTION INTRAVENOUS at 14:39

## 2022-12-05 NOTE — THERAPY EVALUATION
Patient Name: Mary Alice Sanders  : 1935    MRN: [id number]                              Today's Date: 2022       Admit Date: 2022    Visit Dx:     ICD-10-CM ICD-9-CM   1. Hypernatremia  E87.0 276.0   2. Altered mental status, unspecified altered mental status type  R41.82 780.97   3. DUTCH (acute kidney injury) (HCC)  N17.9 584.9   4. Lactic acid acidosis  E87.20 276.2     Patient Active Problem List   Diagnosis   • Blues   • Acid reflux   • Blood glucose elevated   • Hypercholesterolemia   • BP (high blood pressure)   • OP (osteoporosis)   • Allergic rhinitis   • Alzheimer's disease (HCC)   • Vitamin B 12 deficiency   • Cystitis   • Bronchitis   • Vitamin D toxicity   • Complicated UTI (urinary tract infection)   • Hypertension   • Decreased responsiveness   • DNR (do not resuscitate)   • Vasovagal episode   • Malnutrition (HCC)   • Age-related physical debility   • Hypernatremia   • Severe malnutrition (HCC)     Past Medical History:   Diagnosis Date   • Allergic rhinitis    • Chest pain    • GERD (gastroesophageal reflux disease)    • Hx of migraine headaches    • Hx: UTI (urinary tract infection)    • Hypertension    • Varicose veins      Past Surgical History:   Procedure Laterality Date   •  SECTION        General Information     Row Name 22 1516          Physical Therapy Time and Intention    Document Type evaluation  -     Mode of Treatment co-treatment;physical therapy;occupational therapy  -     Row Name 22 1516          General Information    Prior Level of Function min assist:;mod assist:;gait;transfer;w/c or scooter;bed mobility  per chart, pt ambulates with assist  -     Existing Precautions/Restrictions fall  -     Barriers to Rehab medically complex;previous functional deficit;cognitive status  -     Row Name 22 1516          Living Environment    People in Home child(sarika), adult  -     Row Name 22 1516          Cognition    Orientation Status  (Cognition) --  pt makes eye contact but did not attempt to verbally communicate or appeared to follow any commands  -     Row Name 12/05/22 1516          Safety Issues, Functional Mobility    Safety Issues Affecting Function (Mobility) ability to follow commands  -     Impairments Affecting Function (Mobility) balance;cognition;endurance/activity tolerance;pain;range of motion (ROM);strength;postural/trunk control  -           User Key  (r) = Recorded By, (t) = Taken By, (c) = Cosigned By    Initials Name Provider Type    Cheyenne Brown PT Physical Therapist               Mobility     Row Name 12/05/22 1518          Bed Mobility    Bed Mobility supine-sit;sit-supine  -     Supine-Sit [place] (Bed Mobility) verbal cues;nonverbal cues (demo/gesture);maximum assist (25% patient effort);2 person assist  -     Sit-Supine [place] (Bed Mobility) verbal cues;nonverbal cues (demo/gesture);maximum assist (25% patient effort);2 person assist  -     Assistive Device (Bed Mobility) bed rails;head of bed elevated  -     Row Name 12/05/22 1518          Transfers    Comment, (Transfers) unable to attempt secondary to impaired sitting balance  -           User Key  (r) = Recorded By, (t) = Taken By, (c) = Cosigned By    Initials Name Provider Type    Cheyenne Brown PT Physical Therapist               Obj/Interventions     Row Name 12/05/22 1519          Range of Motion Comprehensive    Comment, General Range of Motion B LE PROM WFL, no resistance met but pt made no attempt to move B LEs actively  -     Row Name 12/05/22 1519          Strength Comprehensive (MMT)    Comment, General Manual Muscle Testing (MMT) Assessment generalized weakness noted with functional mobility, pt not following commands ad no spontaneous movement of B LEs noted  -     Row Name 12/05/22 1519          Motor Skills    Therapeutic Exercise --  10 reps B LE PROM AP and LAQ  -     Row Name 12/05/22 1519           Balance    Static Sitting Balance verbal cues;non-verbal cues (demo/gesture);minimal assist;moderate assist  -CH     Static Standing Balance moderate assist  -CH     Comment, Balance pt with posterior and R lean in sitting, repeated cuing and assistance to correct  -           User Key  (r) = Recorded By, (t) = Taken By, (c) = Cosigned By    Initials Name Provider Type     Cheyenne Devries, PT Physical Therapist               Goals/Plan     Row Name 12/05/22 1531          Bed Mobility Goal 1 (PT)    Activity/Assistive Device (Bed Mobility Goal 1, PT) bed mobility activities, all  -CH     [place] Level/Cues Needed (Bed Mobility Goal 1, PT) contact guard required  -CH     Time Frame (Bed Mobility Goal 1, PT) 1 week  -     Row Name 12/05/22 1531          Transfer Goal 1 (PT)    Activity/Assistive Device (Transfer Goal 1, PT) transfers, all;walker, rolling  -CH     [place] Level/Cues Needed (Transfer Goal 1, PT) contact guard required  -CH     Time Frame (Transfer Goal 1, PT) 1 week  -     Row Name 12/05/22 1531          Gait Training Goal 1 (PT)    Activity/Assistive Device (Gait Training Goal 1, PT) gait (walking locomotion);walker, rolling  -CH     [place] Level (Gait Training Goal 1, PT) minimum assist (75% or more patient effort)  -CH     Distance (Gait Training Goal 1, PT) 30  -CH     Time Frame (Gait Training Goal 1, PT) 1 week  -     Row Name 12/05/22 1531          Therapy Assessment/Plan (PT)    Planned Therapy Interventions (PT) balance training;bed mobility training;gait training;home exercise program;patient/family education;strengthening;transfer training  -           User Key  (r) = Recorded By, (t) = Taken By, (c) = Cosigned By    Initials Name Provider Type     Cheyenne Devries, PT Physical Therapist               Clinical Impression     Row Name 12/05/22 1528          Pain    Pretreatment Pain Rating 0/10 - no pain  -CH     Posttreatment Pain Rating 0/10 - no pain  -CH      Row Name 12/05/22 1525          Plan of Care Review    Plan of Care Reviewed With patient  -CH     Outcome Evaluation Pt is a 86 yo F who was admitted with increased confusion, COVID, and DUTCH. Pt has a history of dementia and recent UTI with poor intake. Pt presents to PT with impaired functional mobility and gait secondary to generalized weakness, impaired balance, and decreased activity tolerance. Pt having difficulty following commands this session. Pt may benefit from skilled PT to address strength, mobility, and gait.  -     Row Name 12/05/22 1525          Therapy Assessment/Plan (PT)    Patient/Family Therapy Goals Statement (PT) to return to [facility]  -     Rehab Potential (PT) fair, will monitor progress closely  -     Criteria for Skilled Interventions Met (PT) skilled treatment is necessary  -     Therapy Frequency (PT) 5 times/wk  -     Row Name 12/05/22 1525          Positioning and Restraints    Pre-Treatment Position in bed  -     Post Treatment Position bed  -CH     In Bed supine;call light within reach;encouraged to call for assist;exit alarm on;with family/caregiver  -           User Key  (r) = Recorded By, (t) = Taken By, (c) = Cosigned By    Initials Name Provider Type     Cheyenne Devries, PT Physical Therapist               Outcome Measures     Row Name 12/05/22 8924          How much help from another person do you currently need...    Turning from your back to your side while in flat bed without using bedrails? 2  -CH     Moving from lying on back to sitting on the side of a flat bed without bedrails? 2  -CH     Moving to and from a bed to a chair (including a wheelchair)? 1  -CH     Standing up from a chair using your arms (e.g., wheelchair, bedside chair)? 1  -CH     Climbing 3-5 steps with a railing? 1  -CH     To walk in hospital room? 1  -CH     AM-PAC 6 Clicks Score (PT) 8  -CH     Highest level of mobility 3 --> Sat at edge of bed  -     Row Name 12/05/22 4818  12/05/22 1024       Functional Assessment    Outcome Measure Options AM-PAC 6 Clicks Basic Mobility (PT)  - AM-PAC 6 Clicks Daily Activity (OT)  -          User Key  (r) = Recorded By, (t) = Taken By, (c) = Cosigned By    Initials Name Provider Type    LE Rajni Malik, OTR Occupational Therapist     Cheyenne Devries, PT Physical Therapist                             Physical Therapy Education     Title: PT OT SLP Therapies (In Progress)     Topic: Physical Therapy (Done)     Point: Mobility training (Done)     Learning Progress Summary           Patient Acceptance, E,TB,D, VU,NR by  at 12/5/2022 1532                   Point: Home exercise program (Done)     Learning Progress Summary           Patient Acceptance, E,TB,D, VU,NR by  at 12/5/2022 1532                   Point: Body mechanics (Done)     Learning Progress Summary           Patient Acceptance, E,TB,D, VU,NR by  at 12/5/2022 1532                   Point: Precautions (Done)     Learning Progress Summary           Patient Acceptance, E,TB,D, VU,NR by  at 12/5/2022 1532                               User Key     Initials Effective Dates Name Provider Type Discipline     06/16/21 -  Cheyenne Devries, PT Physical Therapist PT              PT Recommendation and Plan  Planned Therapy Interventions (PT): balance training, bed mobility training, gait training, home exercise program, patient/family education, strengthening, transfer training  Plan of Care Reviewed With: patient  Outcome Evaluation: Pt is a 86 yo F who was admitted with increased confusion, COVID, and DUTCH. Pt has a history of dementia and recent UTI with poor intake. Pt presents to PT with impaired functional mobility and gait secondary to generalized weakness, impaired balance, and decreased activity tolerance. Pt having difficulty following commands this session. Pt may benefit from skilled PT to address strength, mobility, and gait.     Time Calculation:    PT Charges     Row  Name 12/05/22 1532             Time Calculation    Start Time 0940  -      Stop Time 1010  -      Time Calculation (min) 30 min  -      PT Received On 12/05/22  -      PT - Next Appointment 12/06/22  -      PT Goal Re-Cert Due Date 12/12/22  -         Time Calculation- PT    Total Timed Code Minutes- PT 20 minute(s)  -CH         Timed Charges    24362 - PT Therapeutic Activity Minutes 20  -CH         Total Minutes    Timed Charges Total Minutes 20  -CH       Total Minutes 20  -CH            User Key  (r) = Recorded By, (t) = Taken By, (c) = Cosigned By    Initials Name Provider Type     Cheyenne Devries, PT Physical Therapist              Therapy Charges for Today     Code Description Service Date Service Provider Modifiers Qty    68112435346 HC PT THERAPEUTIC ACT EA 15 MIN 12/5/2022 Cheyenne Devries, PT GP 1    70229403985 HC PT EVAL MOD COMPLEXITY 2 12/5/2022 Cheyenne Devries, PT GP 1    63071948481 HC PT THER SUPP EA 15 MIN 12/5/2022 Cheyenne Devries, PT GP 1          PT G-Codes  Outcome Measure Options: AM-PAC 6 Clicks Basic Mobility (PT)  AM-PAC 6 Clicks Score (PT): 8  AM-PAC 6 Clicks Score (OT): 6  PT Discharge Summary  Anticipated Discharge Disposition (PT): home with 24/7 care, home with home health, skilled nursing facility (pending progress and home resources)    Cheyenne Devries PT  12/5/2022

## 2022-12-05 NOTE — PLAN OF CARE
Goal Outcome Evaluation:  Plan of Care Reviewed With: patient           Outcome Evaluation: Pt is a 86 yo F who was admitted with increased confusion, COVID, and DUTCH. Pt has a history of dementia and recent UTI with poor intake. Pt presents to PT with impaired functional mobility and gait secondary to generalized weakness, impaired balance, and decreased activity tolerance. Pt having difficulty following commands this session. Pt may benefit from skilled PT to address strength, mobility, and gait.

## 2022-12-05 NOTE — PROGRESS NOTES
Patient confused, has dementia at baseline.  Chart reviewed.  No oxygen desaturations.    1. DUTCH: Likely secondary to volume depletion  2. Electrolytes disturbance: Severe hypernatremia due to water deficit.  Hypokalemia  3. Hypotension, likely secondary to hypovolemia, resolved  4. Lactic acidosis, secondary to hypotension, resolved  5. COVID-19 URI  6. Hyperchloremic metabolic acidosis  7. Encephalopathy, metabolic.  On top of dementia  8. Severe protein calorie malnutrition  9. Acute thrombocytopenia: Unclear how much hydration has affected her levels.  10. Anemia, unclear chronicity.  Hb normal on presentation but patient was extremely dehydrated.    Plan:  Continue weaning oxygen as tolerated.  We have requested hospitalist service to take over care.  We will be available to assist with respiratory issues.  Continue tube feeding if patient is tolerating.  Nephrology following along and adjusting fluids.  Renal function has improved.  Hematology following for low platelets.  They are running several tests.  No clear clinical signs of bleeding.  Patient is malnourished.  She is appropriate for palliative care comfort measures only

## 2022-12-05 NOTE — PROGRESS NOTES
[facility] Progress Note      Patient Name: Mary Alice Sanders  : 1935  MRN: [id number]  Primary Care Physician:  Gera Barker MD  Date of admission: 2022    Subjective     Interval History:   No issues noted per nursing staff overnight.   lewthargic today       Review of Systems:   As noted above    Objective     Vitals:   Temp:  [98 °F (36.7 °C)-98.5 °F (36.9 °C)] 98.3 °F (36.8 °C)  Heart Rate:  [85-88] 86  Resp:  [18-20] 18  BP: ()/(75-98) 116/81    Intake/Output Summary (Last 24 hours) at 2022 0949  Last data filed at 2022 0748  Gross per 24 hour   Intake --   Output 1950 ml   Net -1950 ml       Physical Exam:    General: Ill looking, on high flow oxygen  HEENT: Anicteric sclera, conjunctiva injected, no sinus tenderness, oral mucosa moist.  Neck: supple, no thyromegaly and trachea is midline. No JVD.  Heart: Regular rate and rhythm no murmur S1-S2 normal.  Lungs: Clear to auscultation bilaterally,  no wheezes and  no crackles. Nonlabored.  Abdomen:  soft, not distended, positive bowel sounds.  Nontender, no rebound tenderness.  No  CVA tenderness   Genitourinary: Deferred  Extremities no lower extremity edema  Neurologic  examination: Alert, oriented.  Moves all extremities    Scheduled Meds:     insulin regular, 0-7 Units, Subcutaneous, Q6H  potassium phosphate, 15 mmol, Intravenous, Once  sodium chloride, 10 mL, Intravenous, Q12H      IV Meds:   dextrose, 75 mL/hr, Last Rate: 75 mL/hr (22 1428)        Results Reviewed:   I have personally reviewed the results from the time of this admission to 2022 09:49 EST     Results from last 7 days   Lab Units 22  0359 22  0400 22  0413 22  1826 22  1259   SODIUM mmol/L 137 140 142   < > >180*   POTASSIUM mmol/L 2.8* 3.3* 3.1*   < > 3.6   CHLORIDE mmol/L 105 110* 112*   < > >140*   CO2 mmol/L 20.9* 20.3* 19.0*   < > 23.2   BUN mg/dL 5* 7* 15   < > 68*   CREATININE mg/dL  0.52* 0.50* 0.47*   < > 1.50*   CALCIUM mg/dL 7.4* 7.1* 6.3*   < > 8.2*   BILIRUBIN mg/dL  --  1.0  --   --  1.1   ALK PHOS U/L  --  94  --   --  104   ALT (SGPT) U/L  --  42*  --   --  40*   AST (SGOT) U/L  --  55*  --   --  24   GLUCOSE mg/dL 99 96 96   < > 133*    < > = values in this interval not displayed.       Estimated Creatinine Clearance: 67.9 mL/min (A) (by C-G formula based on SCr of 0.52 mg/dL (L)).    Results from last 7 days   Lab Units 12/05/22  0359 12/03/22  0413 12/02/22  1212 12/01/22  1209 12/01/22  0222 11/30/22  1943 11/30/22  1826   MAGNESIUM mg/dL  --  1.9  --   --  3.1*  --  2.7*   PHOSPHORUS mg/dL 1.8* 1.7* 3.4   < > 2.0*   < > 2.5    < > = values in this interval not displayed.             Results from last 7 days   Lab Units 12/05/22  0359 12/04/22  0400 12/03/22  0413 12/02/22  0605 12/01/22  0222   WBC 10*3/mm3 6.22 5.45 4.97 7.26 10.42   HEMOGLOBIN g/dL 11.6* 11.1* 9.9* 10.8* 15.2   PLATELETS 10*3/mm3 66* 53*  53* 48* 54* 73*             Assessment / Plan     ASSESSMENT:  1.  DUTCH, nonoliguric, resolved:  prerenal due to severe volume depletion and hypotension.  Admission urinalysis: very concentrated, minimal proteinuria, no cells  2.  Hypernatremia due to severe water deficit, in association with change in mental status: slowly better  3.  Reported confusion superimposed on dementia  4.  Hypotension, likely from hypovolemia  5.  COVID infection  6.  Immobility syndrome with bedsores    PLAN:  Hyponatremia improved. We will D.C half normal saline  Replace potassium   continue gentle hydration with normal saline.       Thank you for involving us in the care of Mary Alice Sanders.  Please feel free to call with any questions.    Jeremy Stone MD  12/05/22  09:49 [facility]    [facility]  [phone]    Please note that portions of this note were completed with a voice recognition program.

## 2022-12-05 NOTE — PLAN OF CARE
Goal Outcome Evaluation:  Plan of Care Reviewed With: patient           Outcome Evaluation: Pt admit from home with confusion, COVID (+). Pt lives at home with son and per CCP notes is independent with ADL.  History includes dementia and UTI 2 weeks ago with poor intake.  Pt seen by therapy and is assist of 2 to move to sit EOB with max A of 2 and min/mod A with stting balance.  Pt does grasp and hold with B hands but does not follow commands.  Anticipate full assist ADL at this time as pt does not grap toothette when placed in hand.  Will cont to follow for skilled OT to increase independence and activity tolerance.  D/c plan pending progress and support at d/c.

## 2022-12-05 NOTE — THERAPY EVALUATION
Patient Name: Mary Alice Sanders  : 1935    MRN: [id number]                              Today's Date: 2022       Admit Date: 2022    Visit Dx:     ICD-10-CM ICD-9-CM   1. Hypernatremia  E87.0 276.0   2. Altered mental status, unspecified altered mental status type  R41.82 780.97   3. DUTCH (acute kidney injury) (HCC)  N17.9 584.9   4. Lactic acid acidosis  E87.20 276.2     Patient Active Problem List   Diagnosis   • Blues   • Acid reflux   • Blood glucose elevated   • Hypercholesterolemia   • BP (high blood pressure)   • OP (osteoporosis)   • Allergic rhinitis   • Alzheimer's disease (HCC)   • Vitamin B 12 deficiency   • Cystitis   • Bronchitis   • Vitamin D toxicity   • Complicated UTI (urinary tract infection)   • Hypertension   • Decreased responsiveness   • DNR (do not resuscitate)   • Vasovagal episode   • Malnutrition (HCC)   • Age-related physical debility   • Hypernatremia   • Severe malnutrition (HCC)     Past Medical History:   Diagnosis Date   • Allergic rhinitis    • Chest pain    • GERD (gastroesophageal reflux disease)    • Hx of migraine headaches    • Hx: UTI (urinary tract infection)    • Hypertension    • Varicose veins      Past Surgical History:   Procedure Laterality Date   •  SECTION        General Information     Row Name 22 1020          OT Time and Intention    Document Type evaluation;therapy note (daily note)  -LE     Mode of Treatment occupational therapy;individual therapy  -     Row Name 22 1020          General Information    Patient Profile Reviewed yes  -LE     Existing Precautions/Restrictions fall  COVID  -LE     Barriers to Rehab medically complex;previous functional deficit;cognitive status  -LE     Row Name 22 1020          Living Environment    People in Home child(sarika), adult  -     Row Name 22 1020          Cognition    Orientation Status (Cognition) --  pt does not verbalize.  once awake does make eye contact  and brief smile.  no following commands.  -     Row Name 12/05/22 1020          Safety Issues, Functional Mobility    Safety Issues Affecting Function (Mobility) ability to follow commands  -     Impairments Affecting Function (Mobility) balance;cognition;endurance/activity tolerance;pain;range of motion (ROM);strength;postural/trunk control  -     Comment, Safety Issues/Impairments (Mobility) non skid socks  -           User Key  (r) = Recorded By, (t) = Taken By, (c) = Cosigned By    Initials Name Provider Type    Rajni Felder OTR Occupational Therapist                 Mobility/ADL's     Row Name 12/05/22 1021          Bed Mobility    Bed Mobility scooting/bridging;supine-sit;sit-supine  -LE     Scooting/Bridging [place] (Bed Mobility) dependent (less than 25% patient effort);verbal cues;nonverbal cues (demo/gesture);2 person assist  -LE     Supine-Sit [place] (Bed Mobility) nonverbal cues (demo/gesture);verbal cues;maximum assist (25% patient effort);2 person assist  -LE     Sit-Supine [place] (Bed Mobility) nonverbal cues (demo/gesture);verbal cues;maximum assist (25% patient effort);2 person assist  -LE     Bed Mobility, Safety Issues cognitive deficits limit understanding;decreased use of arms for pushing/pulling;decreased use of legs for bridging/pushing;impaired trunk control for bed mobility  -     Assistive Device (Bed Mobility) bed rails;draw sheet;head of bed elevated  -     Row Name 12/05/22 1021          Transfers    Comment, (Transfers) NT due poor sitting balance.  -     Row Name 12/05/22 1021          Activities of Daily Living    BADL Assessment/Intervention toileting;feeding;grooming;lower body dressing  -     Row Name 12/05/22 1021          Toileting Assessment/Training    [place] Level (Toileting) dependent (less than 25% patient effort)  -     Comment, (Toileting) chris.  -     Row Name 12/05/22 1021          Self-Feeding Assessment/Training    Comment, (Feeding)  NPO.  plan tube tody  -[facility] Name 12/05/22 1021          Grooming Assessment/Training    [place] Level (Grooming) oral care regimen;dependent (less than 25% patient effort)  -LE     Position (Grooming) sitting up in bed  -LE     Comment, (Grooming) placed toothette in hand and gesture to mouth. pt does not make effort to grasp toothette.  -[facility] Name 12/05/22 1021          Lower Body Dressing Assessment/Training    [place] Level (Lower Body Dressing) don;socks;dependent (less than 25% patient effort)  -LE     Position (Lower Body Dressing) supine  -LE           User Key  (r) = Recorded By, (t) = Taken By, (c) = Cosigned By    Initials Name Provider Type    Rajni Felder OTR Occupational Therapist               Obj/Interventions     [facility] Name 12/05/22 1022          Sensory Assessment (Somatosensory)    Sensory Assessment (Somatosensory) unable/difficult to assess  -[facility] Name 12/05/22 1022          Vision Assessment/Intervention    Visual Impairment/Limitations unable/difficult to assess  -LE     Row Name 12/05/22 1022          Range of Motion Comprehensive    Comment, General Range of Motion PROM to about 1/2 range with ? resistance vs hard end feel.  pt does grasp OT hands with B hands when sitting EOB to help support self.  -[facility] Name 12/05/22 1022          Balance    Balance Assessment sitting static balance  -LE     Static Sitting Balance moderate assist;minimal assist;non-verbal cues (demo/gesture);verbal cues  R lean.  -LE           User Key  (r) = Recorded By, (t) = Taken By, (c) = Cosigned By    Initials Name Provider Type    Rajni Felder OTR Occupational Therapist               Goals/Plan     [facility] Name 12/05/22 1023          Bed Mobility Goal 1 (OT)    Activity/Assistive Device (Bed Mobility Goal 1, OT) sit to supine;supine to sit  -LE     [place] Level/Cues Needed (Bed Mobility Goal 1, OT) moderate assist (50-74% patient effort);verbal cues required;tactile cues  required  -LE     Time Frame (Bed Mobility Goal 1, OT) 2 weeks  -LE     Progress/Outcomes (Bed Mobility Goal 1, OT) goal ongoing  -LE     Row Name 12/05/22 1023          Transfer Goal 1 (OT)    Activity/Assistive Device (Transfer Goal 1, OT) sit-to-stand/stand-to-sit  -LE     [place] Level/Cues Needed (Transfer Goal 1, OT) maximum assist (25-49% patient effort);verbal cues required;nonverbal cues (demo/gesture) required  of 2  -LE     Time Frame (Transfer Goal 1, OT) 2 weeks  -LE     Progress/Outcome (Transfer Goal 1, OT) goal ongoing  -LE     Row Name 12/05/22 1023          Grooming Goal 1 (OT)    Activity/Device (Grooming Goal 1, OT) oral care;wash face, hands  -LE     [place] (Grooming Goal 1, OT) moderate assist (50-74% patient effort);verbal cues required;nonverbal cues (demo/gesture) required  -LE     Time Frame (Grooming Goal 1, OT) 2 weeks  -LE     Progress/Outcome (Grooming Goal 1, OT) goal ongoing  -LE     Row Name 12/05/22 1023          ROM Goal 1 (OT)    ROM Goal 1 (OT) B UE A/AROM to increase functional reach and hold during ADL tasks.  -LE     Time Frame (ROM Goal 1, OT) 2 weeks  -LE     Progress/Outcome (ROM Goal 1, OT) goal ongoing  -LE     Row Name 12/05/22 1023          Therapy Assessment/Plan (OT)    Planned Therapy Interventions (OT) activity tolerance training;adaptive equipment training;transfer/mobility retraining;ROM/therapeutic exercise;functional balance retraining;occupation/activity based interventions;patient/caregiver education/training;cognitive/visual perception retraining  -LE           User Key  (r) = Recorded By, (t) = Taken By, (c) = Cosigned By    Initials Name Provider Type    Rajni Felder OTR Occupational Therapist               Clinical Impression     Row Name 12/05/22 1014          Pain Assessment    Pre/Posttreatment Pain Comment grimace when moving B LE.  -LE     Row Name 12/05/22 1014          Plan of Care Review    Plan of Care Reviewed With patient  -LE      Outcome Evaluation Pt admit from home with confusion, COVID (+). Pt lives at home with son and per CCP notes is independent with ADL.  History includes dementia and UTI 2 weeks ago with poor intake.  Pt seen by therapy and is assist of 2 to move to sit EOB with max A of 2 and min/mod A with stting balance.  Pt does grasp and hold with B hands but does not follow commands.  Anticipate full assist ADL at this time as pt does not grap toothette when placed in hand.  Will cont to follow for skilled OT to increase independence and activity tolerance.  D/c plan pending progress and support at d/c.  -     Row Name 12/05/22 1014          Therapy Assessment/Plan (OT)    Rehab Potential (OT) fair, will monitor progress closely  -LE     Criteria for Skilled Therapeutic Interventions Met (OT) meets criteria;yes  -LE     Therapy Frequency (OT) 3 times/wk  -LE     Row Name 12/05/22 1014          Vital Signs    O2 Delivery Pre Treatment room air  -LE     O2 Delivery Intra Treatment room air  -LE     O2 Delivery Post Treatment room air  -LE     Pre Patient Position Supine  -LE     Intra Patient Position Sitting  -LE     Post Patient Position Supine  -LE     Row Name 12/05/22 1014          Positioning and Restraints    Pre-Treatment Position in bed  -LE     Post Treatment Position bed  -LE     In Bed fowlers;call light within reach;encouraged to call for assist;exit alarm on;heels elevated  -LE           User Key  (r) = Recorded By, (t) = Taken By, (c) = Cosigned By    Initials Name Provider Type    Rajni Felder OTR Occupational Therapist               Outcome Measures     Row Name 12/05/22 1024          How much help from another is currently needed...    Putting on and taking off regular lower body clothing? 1  -LE     Bathing (including washing, rinsing, and drying) 1  -LE     Toileting (which includes using toilet bed pan or urinal) 1  -LE     Putting on and taking off regular upper body clothing 1  -LE     Taking care of  personal grooming (such as brushing teeth) 1  -LE     Eating meals 1  -LE     AM-PAC 6 Clicks Score (OT) 6  -LE     Row Name 12/05/22 1024          Functional Assessment    Outcome Measure Options AM-PAC 6 Clicks Daily Activity (OT)  -LE           User Key  (r) = Recorded By, (t) = Taken By, (c) = Cosigned By    Initials Name Provider Type    LE Rajni Malik OTR Occupational Therapist                Occupational Therapy Education     Title: PT OT SLP Therapies (In Progress)     Topic: Occupational Therapy (In Progress)     Point: ADL training (In Progress)     Description:   Instruct learner(s) on proper safety adaptation and remediation techniques during self care or transfers.   Instruct in proper use of assistive devices.              Learning Progress Summary           Patient Nonacceptance, E, NL by MARYANNE at 12/5/2022 1025    Comment: dementia  limits ed on role of OT, call light use and sreekanth of activity                   Point: Body mechanics (In Progress)     Description:   Instruct learner(s) on proper positioning and spine alignment during self-care, functional mobility activities and/or exercises.              Learning Progress Summary           Patient Nonacceptance, E, NL by MARYANNE at 12/5/2022 1025    Comment: dementia  limits ed on role of OT, call light use and sreekanth of activity                               User Key     Initials Effective Dates Name Provider Type Discipline    MARYANNE 06/16/21 -  Rajni Malik OTR Occupational Therapist OT              OT Recommendation and Plan  Planned Therapy Interventions (OT): activity tolerance training, adaptive equipment training, transfer/mobility retraining, ROM/therapeutic exercise, functional balance retraining, occupation/activity based interventions, patient/caregiver education/training, cognitive/visual perception retraining  Therapy Frequency (OT): 3 times/wk  Plan of Care Review  Plan of Care Reviewed With: patient  Outcome Evaluation: Pt admit from home with  confusion, COVID (+). Pt lives at home with son and per CCP notes is independent with ADL.  History includes dementia and UTI 2 weeks ago with poor intake.  Pt seen by therapy and is assist of 2 to move to sit EOB with max A of 2 and min/mod A with stting balance.  Pt does grasp and hold with B hands but does not follow commands.  Anticipate full assist ADL at this time as pt does not grap toothette when placed in hand.  Will cont to follow for skilled OT to increase independence and activity tolerance.  D/c plan pending progress and support at d/c.     Time Calculation:    Time Calculation- OT     Row Name 12/05/22 1025             Time Calculation- OT    OT Start Time 0945  -LE      OT Stop Time 1005  -LE      OT Time Calculation (min) 20 min  -LE      Total Timed Code Minutes- OT 8 minute(s)  -LE      OT Received On 12/05/22  -LE      OT - Next Appointment 12/07/22  -LE      OT Goal Re-Cert Due Date 12/19/22  -LE         Timed Charges    04287 - OT Therapeutic Activity Minutes 8  -LE         Untimed Charges    OT Eval/Re-eval Minutes 12  -LE         Total Minutes    Timed Charges Total Minutes 8  -LE      Untimed Charges Total Minutes 12  -LE       Total Minutes 20  -LE            User Key  (r) = Recorded By, (t) = Taken By, (c) = Cosigned By    Initials Name Provider Type    Rajni Felder OTR Occupational Therapist              Therapy Charges for Today     Code Description Service Date Service Provider Modifiers Qty    97593516244 HC OT EVAL MOD COMPLEXITY 3 12/5/2022 Rajni Malik OTR GO 1    [id number]  OT THERAPEUTIC ACT EA 15 MIN 12/5/2022 Rajni Malik OTR GO 1               TEODORO Leach  12/5/2022

## 2022-12-05 NOTE — PROGRESS NOTES
"DAILY PROGRESS NOTE  [facility]    Patient Identification:  Name: Mary Alice Sanders  Age: 87 y.o.  Sex: female  :  1935  MRN: [id number]         Primary Care Physician: Gera Barker MD      Subjective  Patient is nonverbal.    Objective:  General Appearance:  Comfortable, in no acute distress and not in pain (Thin, frail.).    Vital signs: (most recent): Blood pressure 119/58, pulse 80, temperature 98 °F (36.7 °C), temperature source Oral, resp. rate 18, height 165.1 cm (65\"), weight 56.4 kg (124 lb 6.4 oz), SpO2 96 %.    Lungs:  Normal effort and normal respiratory rate.  Breath sounds clear to auscultation.    Heart: Normal rate.  Regular rhythm.    Neurological: (Patient is awake.  Nonverbal.  Periodically makes eye contact.  For the most part stares at the television.  Does not assist in the exam.).    Skin:  Warm and dry.                Vital signs in last 24 hours:  Temp:  [98 °F (36.7 °C)-98.3 °F (36.8 °C)] 98 °F (36.7 °C)  Heart Rate:  [80-86] 80  Resp:  [18-20] 18  BP: ()/(58-98) 119/58    Intake/Output:    Intake/Output Summary (Last 24 hours) at 2022 1622  Last data filed at 2022 1307  Gross per 24 hour   Intake --   Output 1700 ml   Net -1700 ml         Results from last 7 days   Lab Units 22  0400 22  0413 12/02/22  0605 22  0222 22  1140   WBC 10*3/mm3 6.22 5.45 4.97 7.26 10.42 11.42*   HEMOGLOBIN g/dL 11.6* 11.1* 9.9* 10.8* 15.2 17.2*   PLATELETS 10*3/mm3 66* 53*  53* 48* 54* 73* 101*     Results from last 7 days   Lab Units 22  0400 223 22  1212 22  0605 22  0012 22  1813   SODIUM mmol/L 137 140 142 146* 151* 156* 164*   POTASSIUM mmol/L 2.8* 3.3* 3.1* 3.5 3.3* 3.5 4.3   CHLORIDE mmol/L 105 110* 112* 118* 121* 127* 135*   CO2 mmol/L 20.9* 20.3* 19.0* 22.1 21.9* 19.5* 18.9*   BUN mg/dL 5* 7* 15 20 27* 33* 43*   CREATININE mg/dL 0.52* 0.50* 0.47* 0.59 0.56* 0.65 0.97 "   GLUCOSE mg/dL 99 96 96 91 117* 124* 78   Estimated Creatinine Clearance: 67.9 mL/min (A) (by C-G formula based on SCr of 0.52 mg/dL (L)).  Results from last 7 days   Lab Units 12/05/22 0359 12/04/22 0400 12/03/22 0413 12/02/22 1212 12/02/22  0605 12/02/22 0012 12/01/22  1813 12/01/22  1209 12/01/22 0222 11/30/22 1943 11/30/22  1826   CALCIUM mg/dL 7.4* 7.1* 6.3* 6.3* 6.8* 6.7* 7.3* 7.7* 8.3*   < > 7.5*   ALBUMIN g/dL 2.70* 2.70* 2.20* 2.40* 2.70* 2.50* 2.80* 2.90* 3.20*   < > 3.00*   MAGNESIUM mg/dL  --   --  1.9  --   --   --   --   --  3.1*  --  2.7*   PHOSPHORUS mg/dL 1.8*  --  1.7* 3.4 1.7* 2.0* 3.2 2.1* 2.0*   < > 2.5    < > = values in this interval not displayed.     Results from last 7 days   Lab Units 12/05/22 0359 12/04/22 0400 12/03/22 0413 12/02/22 1212 12/02/22  0605 12/02/22 0012 12/01/22 1813 11/30/22  1826 11/30/22  1259   ALBUMIN g/dL 2.70* 2.70* 2.20* 2.40* 2.70* 2.50* 2.80*   < > 3.30*   BILIRUBIN mg/dL  --  1.0  --   --   --   --   --   --  1.1   ALK PHOS U/L  --  94  --   --   --   --   --   --  104   AST (SGOT) U/L  --  55*  --   --   --   --   --   --  24   ALT (SGPT) U/L  --  42*  --   --   --   --   --   --  40*    < > = values in this interval not displayed.       Assessment:  Severe dehydration: Resolved.  DUTCH: Secondary to above.  Resolved.  Nephrology input greatly appreciated.  Hypernatremia- severe: Resolved.  Again secondary to severe dehydration.   Hypokalemia/hypophosphatemia: Both also being managed by nephrology.  Input appreciated.  Potassium down again today.  Hypotension: Likely secondary to low intravascular volume.  Baseline not known but reportedly a history of high blood pressure.  Improved.  Continue hydration.  Severe malnutrition: Speech therapy, nutrition evaluation.  Unable to successfully place an NG feeding tube this weekend.  Core track still pending.    COVID-19 positive: No clinical indication of active disease.  Anemia: Hemoconcentration on  presentation.  Now with low hemoglobin.    Improving again today.  Iron panel consistent with anemia of chronic disease.  B12 and folate are normal   Thrombocytopenia:  Beginning to improve again today.  IPF is elevated indicating either consumption or recovery.  Suspect the latter.  B12, folate etc. as above.  Hematology input appreciated.  History of vitamin D toxicity:  Resolved.  DNR:      Plan:  Please see above.  Discussed with YODIT Gregory MD  12/5/2022  16:22 EST

## 2022-12-05 NOTE — PROGRESS NOTES
[facility] GROUP INPATIENT PROGRESS NOTE    Length of Stay:  5 days    CHIEF COMPLAINT:  Severe hyponatremia, DUTCH, anemia, thrombocytopenia    SUBJECTIVE:     Pt not verbal    ROS:  Review of Systems   Unable to perform ROS: Mental status change        OBJECTIVE:  Vitals:    12/04/22 2300 12/05/22 0700 12/05/22 0748 12/05/22 1307   BP: 95/75  116/81 119/58   BP Location: Right arm  Right arm Right arm   Patient Position: Lying  Lying Lying   Pulse: 85  86 80   Resp: 20  18 18   Temp: 98 °F (36.7 °C)  98.3 °F (36.8 °C) 98 °F (36.7 °C)   TempSrc: Oral  Oral Oral   SpO2: 97%  95% 96%   Weight:  56.4 kg (124 lb 6.4 oz)     Height:             PHYSICAL EXAMINATION:    Laying in bed none verbal  DIAGNOSTIC DATA:  Results Review:     I reviewed the patient's new clinical results.    Results from last 7 days   Lab Units 12/05/22  0359 12/04/22  0400 12/03/22  0413   WBC 10*3/mm3 6.22 5.45 4.97   HEMOGLOBIN g/dL 11.6* 11.1* 9.9*   HEMATOCRIT % 34.1 32.1* 29.3*   PLATELETS 10*3/mm3 66* 53*  53* 48*      Results from last 7 days   Lab Units 12/05/22  0359 12/04/22  0400 12/03/22  0413 11/30/22  1826 11/30/22  1259   SODIUM mmol/L 137 140 142   < > >180*   POTASSIUM mmol/L 2.8* 3.3* 3.1*   < > 3.6   CHLORIDE mmol/L 105 110* 112*   < > >140*   CO2 mmol/L 20.9* 20.3* 19.0*   < > 23.2   BUN mg/dL 5* 7* 15   < > 68*   CREATININE mg/dL 0.52* 0.50* 0.47*   < > 1.50*   CALCIUM mg/dL 7.4* 7.1* 6.3*   < > 8.2*   BILIRUBIN mg/dL  --  1.0  --   --  1.1   ALK PHOS U/L  --  94  --   --  104   ALT (SGPT) U/L  --  42*  --   --  40*   AST (SGOT) U/L  --  55*  --   --  24   GLUCOSE mg/dL 99 96 96   < > 133*    < > = values in this interval not displayed.      Lab Results   Component Value Date    NEUTROABS 4.24 12/05/2022         Results from last 7 days   Lab Units 12/03/22  0413   MAGNESIUM mg/dL 1.9         Assessment & Plan   ASSESSMENT/PLAN:  This is a 87 y.o. female with:     *Severe hypernatremia  • Sodium on admission  11/30/2022 was greater than 180 in the setting of severe dehydration  • Nephrology managing, with hydration, sodium normalized     *DUTCH  • Previous normal creatinine  • On admission creatinine 1.5 11/30/2022 in the setting of severe dehydration  • Creatinine is normalized with hydration     *COVID-19 infection  • Identified on admission 11/30/2022  • Patient has had no specific manifestations of COVID-19 infection and no specific treatment has been administered.     *Thrombocytopenia  • Previous labs available prior to admission from 10/8/2021 with WBC 5.78, hemoglobin 12, platelet count 177,000 with normal differential on the white count.    • On admission 11/30/2022, WBC 11.42 with hemoglobin 17.2, hematocrit 52.5, platelet count 101,000.    • Patient presented with severe volume deficit producing hyponatremia and acute kidney injury as well as the elevated WBC and hematocrit and likely falsely elevated platelet count.  With hydration, counts have declined.    • Today, patient with WBC 4.97, normal differential, hemoglobin 9.9, MCV 94.5, platelet count 48,000.  • Unclear what her platelet count has been over the past year prior to admission.  This may be purely related to COVID-19 infection.    • Labs on 12/4/2022 with B12 1652, folate 14.6.  • IPF elevated at 27% on 12/4/2022 indicating marrow response from peripheral destructive process.  Unclear whether patient may have ITP or possibly virally mediated thrombocytopenia related to COVID-19.  • Platelet count today has improved slightly at 53,000.  We will monitor for now and consider need for further evaluation pending count over the next few days.  With coexisting anemia, will check serum protein electrophoresis, immunoelectrophoresis, quantitative immunoglobulins.   On 12/05/2022 serum protein immunoelectrophoresis has been sent, report is pending.    ·   *Normocytic anemia  • Previous labs available prior to admission from 10/8/2021 with hemoglobin 12  • On  admission 11/30/2022, hemoglobin 17.2, hematocrit 52.5.    • Patient presented with severe volume deficit producing hyponatremia and acute kidney injury as well as the elevated hematocrit.  With hydration, counts have declined.    • There has been no clinical evidence of GI blood loss, stool for occult blood pending  • Additional labs on 12/4/2022 with iron 39, ferritin 445, iron saturation 26%, TIBC 152, folate 14.6, B12 1652.  • Labs consistent with anemia secondary to chronic disease.  Given the concurrent thrombocytopenia we will also check serum protein electrophoresis, immunoelectrophoresis, quantitative immunoglobulins.  If however this was abnormal, would likely not pursue additional evaluation given her underlying dementia.  On 12/05/2022 anemia remains stable with normal B12 and normal folate. Ferritin level is not interpretable given the fact that she has COVID with a sedimentation rate of 26 mm/h.          *CODE STATUS  • Patient is DNR/DNI     PLAN:     On 12/05/2022 I have nothing to add to this patient's care in the background of COVID infection that is not symptomatic and with 87-year-old female who had very severe hypernatremia. Therefore, I do not expect that we will be able to proceed with too much of anything. There is no room here for any other interpretation. Even if we find that she has an abnormal protein electrophoresis I do not recommend to pursue any other intervention.                       Kyle Mims MD

## 2022-12-06 ENCOUNTER — APPOINTMENT (OUTPATIENT)
Dept: GENERAL RADIOLOGY | Facility: HOSPITAL | Age: 87
End: 2022-12-06

## 2022-12-06 LAB
ALBUMIN SERPL ELPH-MCNC: 2.5 G/DL (ref 2.9–4.4)
ALBUMIN SERPL-MCNC: 2.6 G/DL (ref 3.5–5.2)
ALBUMIN/GLOB SERPL: 1.1 {RATIO} (ref 0.7–1.7)
ALPHA1 GLOB SERPL ELPH-MCNC: 0.3 G/DL (ref 0–0.4)
ALPHA2 GLOB SERPL ELPH-MCNC: 0.7 G/DL (ref 0.4–1)
ANION GAP SERPL CALCULATED.3IONS-SCNC: 8 MMOL/L (ref 5–15)
B-GLOBULIN SERPL ELPH-MCNC: 0.7 G/DL (ref 0.7–1.3)
BASOPHILS # BLD AUTO: 0.01 10*3/MM3 (ref 0–0.2)
BASOPHILS NFR BLD AUTO: 0.2 % (ref 0–1.5)
BUN SERPL-MCNC: 7 MG/DL (ref 8–23)
BUN/CREAT SERPL: 14.6 (ref 7–25)
CALCIUM SPEC-SCNC: 7.6 MG/DL (ref 8.6–10.5)
CHLORIDE SERPL-SCNC: 110 MMOL/L (ref 98–107)
CO2 SERPL-SCNC: 19 MMOL/L (ref 22–29)
CREAT SERPL-MCNC: 0.48 MG/DL (ref 0.57–1)
DEPRECATED RDW RBC AUTO: 42.2 FL (ref 37–54)
EGFRCR SERPLBLD CKD-EPI 2021: 91.8 ML/MIN/1.73
EOSINOPHIL # BLD AUTO: 0.05 10*3/MM3 (ref 0–0.4)
EOSINOPHIL NFR BLD AUTO: 0.8 % (ref 0.3–6.2)
ERYTHROCYTE [DISTWIDTH] IN BLOOD BY AUTOMATED COUNT: 13 % (ref 12.3–15.4)
GAMMA GLOB SERPL ELPH-MCNC: 0.5 G/DL (ref 0.4–1.8)
GLOBULIN SER-MCNC: 2.3 G/DL (ref 2.2–3.9)
GLUCOSE BLDC GLUCOMTR-MCNC: 102 MG/DL (ref 70–130)
GLUCOSE BLDC GLUCOMTR-MCNC: 113 MG/DL (ref 70–130)
GLUCOSE BLDC GLUCOMTR-MCNC: 88 MG/DL (ref 70–130)
GLUCOSE BLDC GLUCOMTR-MCNC: 95 MG/DL (ref 70–130)
GLUCOSE BLDC GLUCOMTR-MCNC: 96 MG/DL (ref 70–130)
GLUCOSE BLDC GLUCOMTR-MCNC: 96 MG/DL (ref 70–130)
GLUCOSE SERPL-MCNC: 89 MG/DL (ref 65–99)
HCT VFR BLD AUTO: 31 % (ref 34–46.6)
HGB BLD-MCNC: 11.1 G/DL (ref 12–15.9)
IGA SERPL-MCNC: 161 MG/DL (ref 64–422)
IGG SERPL-MCNC: 593 MG/DL (ref 586–1602)
IGM SERPL-MCNC: 159 MG/DL (ref 26–217)
INTERPRETATION SERPL IEP-IMP: ABNORMAL
LABORATORY COMMENT REPORT: ABNORMAL
LYMPHOCYTES # BLD AUTO: 1.14 10*3/MM3 (ref 0.7–3.1)
LYMPHOCYTES NFR BLD AUTO: 18.4 % (ref 19.6–45.3)
M PROTEIN SERPL ELPH-MCNC: ABNORMAL G/DL
MCH RBC QN AUTO: 32.4 PG (ref 26.6–33)
MCHC RBC AUTO-ENTMCNC: 35.8 G/DL (ref 31.5–35.7)
MCV RBC AUTO: 90.4 FL (ref 79–97)
MONOCYTES # BLD AUTO: 0.55 10*3/MM3 (ref 0.1–0.9)
MONOCYTES NFR BLD AUTO: 8.9 % (ref 5–12)
NEUTROPHILS NFR BLD AUTO: 4.36 10*3/MM3 (ref 1.7–7)
NEUTROPHILS NFR BLD AUTO: 70.1 % (ref 42.7–76)
PHOSPHATE SERPL-MCNC: 1.8 MG/DL (ref 2.5–4.5)
PHOSPHATE SERPL-MCNC: 2.2 MG/DL (ref 2.5–4.5)
PLATELET # BLD AUTO: 81 10*3/MM3 (ref 140–450)
PMV BLD AUTO: 14.3 FL (ref 6–12)
POTASSIUM SERPL-SCNC: 3.5 MMOL/L (ref 3.5–5.2)
PROT SERPL-MCNC: 4.8 G/DL (ref 6–8.5)
RBC # BLD AUTO: 3.43 10*6/MM3 (ref 3.77–5.28)
SODIUM SERPL-SCNC: 137 MMOL/L (ref 136–145)
WBC NRBC COR # BLD: 6.21 10*3/MM3 (ref 3.4–10.8)

## 2022-12-06 PROCEDURE — 74018 RADEX ABDOMEN 1 VIEW: CPT

## 2022-12-06 PROCEDURE — 99231 SBSQ HOSP IP/OBS SF/LOW 25: CPT | Performed by: INTERNAL MEDICINE

## 2022-12-06 PROCEDURE — 82962 GLUCOSE BLOOD TEST: CPT

## 2022-12-06 PROCEDURE — 92526 ORAL FUNCTION THERAPY: CPT

## 2022-12-06 PROCEDURE — 85025 COMPLETE CBC W/AUTO DIFF WBC: CPT | Performed by: INTERNAL MEDICINE

## 2022-12-06 PROCEDURE — 80069 RENAL FUNCTION PANEL: CPT | Performed by: HOSPITALIST

## 2022-12-06 PROCEDURE — 84100 ASSAY OF PHOSPHORUS: CPT | Performed by: HOSPITALIST

## 2022-12-06 RX ADMIN — Medication 10 ML: at 15:01

## 2022-12-06 RX ADMIN — POTASSIUM PHOSPHATE, MONOBASIC AND POTASSIUM PHOSPHATE, DIBASIC 9 MMOL: 224; 236 INJECTION, SOLUTION, CONCENTRATE INTRAVENOUS at 15:00

## 2022-12-06 NOTE — THERAPY RE-EVALUATION
"Acute Care - Speech Language Pathology   Swallow Re-Evaluation [facility]     Patient Name: Mary Alice Sanders  : 1935  MRN: [id number]  Today's Date: 2022               Admit Date: 2022    Visit Dx:     ICD-10-CM ICD-9-CM   1. Hypernatremia  E87.0 276.0   2. Altered mental status, unspecified altered mental status type  R41.82 780.97   3. DUTCH (acute kidney injury) (HCC)  N17.9 584.9   4. Lactic acid acidosis  E87.20 276.2     Patient Active Problem List   Diagnosis   • Blues   • Acid reflux   • Blood glucose elevated   • Hypercholesterolemia   • BP (high blood pressure)   • OP (osteoporosis)   • Allergic rhinitis   • Alzheimer's disease (HCC)   • Vitamin B 12 deficiency   • Cystitis   • Bronchitis   • Vitamin D toxicity   • Complicated UTI (urinary tract infection)   • Hypertension   • Decreased responsiveness   • DNR (do not resuscitate)   • Vasovagal episode   • Malnutrition (HCC)   • Age-related physical debility   • Hypernatremia   • Severe malnutrition (HCC)     Past Medical History:   Diagnosis Date   • Allergic rhinitis    • Chest pain    • GERD (gastroesophageal reflux disease)    • Hx of migraine headaches    • Hx: UTI (urinary tract infection)    • Hypertension    • Varicose veins      Past Surgical History:   Procedure Laterality Date   •  SECTION         SLP Recommendation and Plan                  Outcome Evaluation: Pt seen for re-evaluation of swallowing. Pt alert and nodding head \"yes\" in response to SLP questions at times. Pt with munching oral movements at rest before and after p.o. trials. Attempted to complete swallowing assessment during trials of ice chips, thin liquids via spoon, cup, and straw, and puree applesauce. Pt with max cues and encouragement. pt accepted 1 trial of ice chip, thin liquid water via spooon x3, and minimal amounts of puree from tip of spoon x2. Pt exhibited prolonged A-P transit and very delayed swallow initiation during trials of puree and did " not appear to initiate swallow until attempting to give pt 3rd trial of puree. Pt appeared to exhibit adequate oral phase for trials of ice chip and thin via spoon. Pt exhibited no overt s/s of penetration/aspiration during any minimal p.o. trials completed. Pt made not attempt and turning head away when giving pt thin via cup and straw and further trials of p.o. Unable to adequately assess swallowing function. Per RN, plan is to palce a thea. Continue to recommend NPO with non-oral meds. Pt can have ice chips and sips of water. Pt appears to prefer water via spoon at this time.      SWALLOW EVALUATION (last 72 hours)     SLP Adult Swallow Evaluation     Row Name 12/06/22 0915          Document Type re-evaluation  -ML    Subjective Information --  no verbalizations  -ML    Patient Observations alert;poorly cooperative  -ML    Patient/Family/Caregiver Comments/Observations No family present  -ML    Patient Effort poor  -ML    Symptoms Noted During/After Treatment none  -ML          Patient Profile Reviewed yes  -ML          Swallow STGs diet tolerance goal selection (SLP)  -ML    Diet Tolerance Goal Selection (SLP) Patient will tolerate therapeutic trials of  -ML          Liquid viscosity (Demonstrate progress toward functional swallow) thin liquids  -ML    [place] (Demonstrate progress towards functional swallow) with minimal cues (75-90% accuracy)  -ML    Time Frame (Demonstrate progress toward functional swallow) 1 week  -ML          Consistencies Trialed (Tolerate therapeutic trials) pureed textures;thin liquids  ice chips  -ML    Desired Outcome (Tolerate therapeutic trials) without signs/symptoms of aspiration;with adequate oral prep/transit/clearance;without signs of distress  -ML    [place] (Tolerate therapeutic trials) with minimal cues (75-90% accuracy)  -ML    Time Frame (Tolerate therapeutic trials) 1 week  -ML    Progress/Outcomes (Tolerate therapeutic trials) goal ongoing  -ML    Comment  "(Tolerate therapeutic trials) Pt seen for re-evaluation of swallowing. Pt alert and nodding head \"yes\" in response to SLP questions at times. Pt with munching oral movements at rest before and after p.o. trials. Attempted to complete swallowing assessment during trials of ice chips, thin liquids via spoon, cup, and straw, and puree applesauce. Pt with max cues and encouragement. pt accepted 1 trial of ice chip, thin liquid water via spooon x3, and minimal amounts of puree from tip of spoon x2. Pt exhibited prolonged A-P transit and very delayed swallow initiation during trials of puree and did not appear to initiate swallow until attempting to give pt 3rd trial of puree. Pt appeared to exhibit adequate oral phase for trials of ice chip and thin via spoon. Pt exhibited no overt s/s of penetration/aspiration during any minimal p.o. trials completed. Pt made not attempt and turning head away when giving pt thin via cup and straw and further trials of p.o. Unable to adequately assess swallowing function. Per RN, plan is to mana sidhu. Continue to recommend NPO with non-oral meds. Pt can have ice chips and sips of water. Pt appears to prefer water via spoon at this time.  -ML          User Key  (r) = Recorded By, (t) = Taken By, (c) = Cosigned By    Initials Name Effective Dates    ML Elidia Coates, MS CCC-SLP 06/16/21 -                 EDUCATION  The patient has been educated in the following areas:   Dysphagia (Swallowing Impairment).        SLP GOALS     Row Name 12/06/22 0915             (LTG) Patient will demonstrate progress toward functional swallow for    Liquid viscosity (Demonstrate progress toward functional swallow) thin liquids  -ML      [place] (Demonstrate progress towards functional swallow) with minimal cues (75-90% accuracy)  -ML      Time Frame (Demonstrate progress toward functional swallow) 1 week  -ML         (STG) Patient will tolerate therapeutic trials of    Consistencies Trialed " "(Tolerate therapeutic trials) pureed textures;thin liquids  ice chips  -ML      Desired Outcome (Tolerate therapeutic trials) without signs/symptoms of aspiration;with adequate oral prep/transit/clearance;without signs of distress  -ML      [place] (Tolerate therapeutic trials) with minimal cues (75-90% accuracy)  -ML      Time Frame (Tolerate therapeutic trials) 1 week  -ML      Progress/Outcomes (Tolerate therapeutic trials) goal ongoing  -ML      Comment (Tolerate therapeutic trials) Pt seen for re-evaluation of swallowing. Pt alert and nodding head \"yes\" in response to SLP questions at times. Pt with munching oral movements at rest before and after p.o. trials. Attempted to complete swallowing assessment during trials of ice chips, thin liquids via spoon, cup, and straw, and puree applesauce. Pt with max cues and encouragement. pt accepted 1 trial of ice chip, thin liquid water via spooon x3, and minimal amounts of puree from tip of spoon x2. Pt exhibited prolonged A-P transit and very delayed swallow initiation during trials of puree and did not appear to initiate swallow until attempting to give pt 3rd trial of puree. Pt appeared to exhibit adequate oral phase for trials of ice chip and thin via spoon. Pt exhibited no overt s/s of penetration/aspiration during any minimal p.o. trials completed. Pt made not attempt and turning head away when giving pt thin via cup and straw and further trials of p.o. Unable to adequately assess swallowing function. Per RN, plan is to palce samantha sidhu. Continue to recommend NPO with non-oral meds. Pt can have ice chips and sips of water. Pt appears to prefer water via spoon at this time.  -ML            User Key  (r) = Recorded By, (t) = Taken By, (c) = Cosigned By    Initials Name Provider Type    Elidia Vizcarra MS CCC-SLP Speech and Language Pathologist                   Time Calculation:    Time Calculation- SLP     Row Name 12/06/22 1028             Time " Calculation- SLP    SLP Start Time 0915  -ML      SLP Received On 12/06/22  -ML         Untimed Charges    [id number] Treatment Swallow Minutes 45  -ML         Total Minutes    Untimed Charges Total Minutes 45  -ML       Total Minutes 45  -ML            User Key  (r) = Recorded By, (t) = Taken By, (c) = Cosigned By    Initials Name Provider Type    Elidia Vizcarra MS CCC-SLP Speech and Language Pathologist                Therapy Charges for Today     Code Description Service Date Service Provider Modifiers Qty    [id number]  ST TREATMENT SWALLOW 3 12/6/2022 Elidia Coates MS CCC-SLP GN 1               MS ALICIA BakerSLP  12/6/2022

## 2022-12-06 NOTE — PLAN OF CARE
"Goal Outcome Evaluation:              Outcome Evaluation: Pt seen for re-evaluation of swallowing. Pt alert and nodding head \"yes\" in response to SLP questions at times. Pt with munching oral movements at rest before and after p.o. trials. Attempted to complete swallowing assessment during trials of ice chips, thin liquids via spoon, cup, and straw, and puree applesauce. With max cues and encouragement. pt accepted 1 trial of ice chip, thin liquid water via spoon x3, and minimal amounts of puree from tip of spoon x2. Pt exhibited prolonged A-P transit and very delayed swallow initiation during trials of puree and did not appear to initiate swallow until attempting to give pt 3rd trial of puree. Pt appeared to exhibit adequate oral phase for trials of ice chip and thin via spoon. Pt exhibited no overt s/s of penetration/aspiration during any minimal p.o. trials completed and exhibited no verbalizations. Pt made no attempt and turning head away when giving pt thin via cup and straw and further trials of p.o. Unable to adequately assess swallowing function. Per RN, plan is to place a cortrak. Continue to recommend NPO with non-oral meds. Pt can have ice chips and sips of water. Pt appears to prefer water via spoon at this time.  "

## 2022-12-06 NOTE — PROGRESS NOTES
N[facility]    Patient Name:  Mary Alice Sanders  YOB: 1935  MRN: [id number]  Admit Date:  11/30/2022    Cortrak tube placed , KUB noted with progression of small bowel gas... poss ileus/obstruction. I did administer a few boluses of air when the tube was placed. Assoc RD to complete TF assessment.     Electronically signed by:  Stephany Diallo RD  12/06/22 13:34 EST

## 2022-12-06 NOTE — PROGRESS NOTES
[facility]IENT PROGRESS NOTE    Length of Stay:  6 days    CHIEF COMPLAINT:  Severe hyponatremia, DUTCH, anemia, thrombocytopenia    SUBJECTIVE:     Pt not verbal    ROS:  Review of Systems   Unable to perform ROS: Mental status change        OBJECTIVE:  Vitals:    12/05/22 2330 12/06/22 0552 12/06/22 0716 12/06/22 1251   BP: 109/89  113/79 108/75   BP Location: Left arm  Left arm Left arm   Patient Position: Lying  Lying Lying   Pulse: 87  109 91   Resp: 18  18 18   Temp: 98.7 °F (37.1 °C)  98.4 °F (36.9 °C) 98 °F (36.7 °C)   TempSrc: Oral  Oral    SpO2: 95%  97% 98%   Weight:  59.1 kg (130 lb 3.2 oz)     Height:             PHYSICAL EXAMINATION:    Laying in bed none verbal  DIAGNOSTIC DATA:  Results Review:     I reviewed the patient's new clinical results.    Results from last 7 days   Lab Units 12/06/22  0358 12/05/22  0359 12/04/22  0400   WBC 10*3/mm3 6.21 6.22 5.45   HEMOGLOBIN g/dL 11.1* 11.6* 11.1*   HEMATOCRIT % 31.0* 34.1 32.1*   PLATELETS 10*3/mm3 81* 66* 53*  53*      Results from last 7 days   Lab Units 12/06/22  0358 12/05/22  0359 12/04/22  0400 11/30/22  1826 11/30/22  1259   SODIUM mmol/L 137 137 140   < > >180*   POTASSIUM mmol/L 3.5 2.8* 3.3*   < > 3.6   CHLORIDE mmol/L 110* 105 110*   < > >140*   CO2 mmol/L 19.0* 20.9* 20.3*   < > 23.2   BUN mg/dL 7* 5* 7*   < > 68*   CREATININE mg/dL 0.48* 0.52* 0.50*   < > 1.50*   CALCIUM mg/dL 7.6* 7.4* 7.1*   < > 8.2*   BILIRUBIN mg/dL  --   --  1.0  --  1.1   ALK PHOS U/L  --   --  94  --  104   ALT (SGPT) U/L  --   --  42*  --  40*   AST (SGOT) U/L  --   --  55*  --  24   GLUCOSE mg/dL 89 99 96   < > 133*    < > = values in this interval not displayed.      Lab Results   Component Value Date    NEUTROABS 4.36 12/06/2022         Results from last 7 days   Lab Units 12/03/22  0413   MAGNESIUM mg/dL 1.9         Assessment & Plan   ASSESSMENT/PLAN:  This is a 87 y.o. female with:     *Severe hypernatremia  • Sodium on admission 11/30/2022  was greater than 180 in the setting of severe dehydration  • Nephrology managing, with hydration, sodium normalized     *DUTCH  • Previous normal creatinine  • On admission creatinine 1.5 11/30/2022 in the setting of severe dehydration  • Creatinine is normalized with hydration     *COVID-19 infection  • Identified on admission 11/30/2022  • Patient has had no specific manifestations of COVID-19 infection and no specific treatment has been administered.     *Thrombocytopenia  • Previous labs available prior to admission from 10/8/2021 with WBC 5.78, hemoglobin 12, platelet count 177,000 with normal differential on the white count.    • On admission 11/30/2022, WBC 11.42 with hemoglobin 17.2, hematocrit 52.5, platelet count 101,000.    • Patient presented with severe volume deficit producing hyponatremia and acute kidney injury as well as the elevated WBC and hematocrit and likely falsely elevated platelet count.  With hydration, counts have declined.    • Today, patient with WBC 4.97, normal differential, hemoglobin 9.9, MCV 94.5, platelet count 48,000.  • Unclear what her platelet count has been over the past year prior to admission.  This may be purely related to COVID-19 infection.    • Labs on 12/4/2022 with B12 1652, folate 14.6.  • IPF elevated at 27% on 12/4/2022 indicating marrow response from peripheral destructive process.  Unclear whether patient may have ITP or possibly virally mediated thrombocytopenia related to COVID-19.  • Platelet count today has improved slightly at 53,000.  We will monitor for now and consider need for further evaluation pending count over the next few days.  With coexisting anemia, will check serum protein electrophoresis, immunoelectrophoresis, quantitative immunoglobulins.   On 12/05/2022 serum protein immunoelectrophoresis has been sent, report is pending.  On 12/06/2022 the patient's platelet count is on the way up to 81,000 with no obvious external bleeding. The white count  and the hemoglobin are stable.      ·   *Normocytic anemia  • Previous labs available prior to admission from 10/8/2021 with hemoglobin 12  • On admission 11/30/2022, hemoglobin 17.2, hematocrit 52.5.    • Patient presented with severe volume deficit producing hyponatremia and acute kidney injury as well as the elevated hematocrit.  With hydration, counts have declined.    • There has been no clinical evidence of GI blood loss, stool for occult blood pending  • Additional labs on 12/4/2022 with iron 39, ferritin 445, iron saturation 26%, TIBC 152, folate 14.6, B12 1652.  • Labs consistent with anemia secondary to chronic disease.  Given the concurrent thrombocytopenia we will also check serum protein electrophoresis, immunoelectrophoresis, quantitative immunoglobulins.  If however this was abnormal, would likely not pursue additional evaluation given her underlying dementia.  On 12/05/2022 anemia remains stable with normal B12 and normal folate. Ferritin level is not interpretable given the fact that she has COVID with a sedimentation rate of 26 mm/h.          *CODE STATUS  • Patient is DNR/DNI     PLAN:     On 12/05/2022 I have nothing to add to this patient's care in the background of COVID infection that is not symptomatic and with 87-year-old female who had very severe hypernatremia. Therefore, I do not expect that we will be able to proceed with too much of anything. There is no room here for any other interpretation. Even if we find that she has an abnormal protein electrophoresis I do not recommend to pursue any other intervention.  On 12/06/2022 the patient's platelet count was on the way to recover to 86,000 with no external bleeding and the hemoglobin is stable in spite of having profound volume replacement therapy given her very dramatic hypernatremia.     I do believe at this time the patient will continue to cover hematological parameters and I find no need to continue following her up in the hospital.  We will not follow her in the office under the present circumstances. I will sign off on her care. Available if needed.    ·                      Kyle Mims MD

## 2022-12-06 NOTE — PROGRESS NOTES
[facility] Progress Note      Patient Name: Mary Alice Sanders  : 1935  MRN: [id number]  Primary Care Physician:  Gera Barker MD  Date of admission: 2022    Subjective     Interval History:   No issues noted per nursing staff overnight.  Continues to be lethargic.  Opens eyes.  Not communicative    Review of Systems:   As noted above    Objective     Vitals:   Temp:  [98 °F (36.7 °C)-98.8 °F (37.1 °C)] 98.4 °F (36.9 °C)  Heart Rate:  [] 109  Resp:  [18] 18  BP: (109-119)/(58-89) 113/79    Intake/Output Summary (Last 24 hours) at 2022 1001  Last data filed at 2022 1307  Gross per 24 hour   Intake --   Output 350 ml   Net -350 ml       Physical Exam:    General: Ill looking, on high flow oxygen  HEENT: Anicteric sclera, conjunctiva injected, no sinus tenderness, oral mucosa moist.  Neck: supple, no thyromegaly and trachea is midline. No JVD.  Heart: Regular rate and rhythm no murmur S1-S2 normal.  Lungs: Clear to auscultation bilaterally,  no wheezes and  no crackles. Nonlabored.  Abdomen:  soft, not distended, positive bowel sounds.  Nontender, no rebound tenderness.  No  CVA tenderness   Genitourinary: Deferred  Extremities no lower extremity edema  Neurologic  examination: Alert, oriented.  Moves all extremities    Scheduled Meds:     insulin regular, 0-7 Units, Subcutaneous, Q6H  potassium phosphate, 15 mmol, Intravenous, Once  sodium chloride, 10 mL, Intravenous, Q12H      IV Meds:   sodium chloride, 50 mL/hr, Last Rate: 50 mL/hr (22 1439)        Results Reviewed:   I have personally reviewed the results from the time of this admission to 2022 10:01 EST     Results from last 7 days   Lab Units 22  0358 22  0359 22  0400 22  1826 22  1259   SODIUM mmol/L 137 137 140   < > >180*   POTASSIUM mmol/L 3.5 2.8* 3.3*   < > 3.6   CHLORIDE mmol/L 110* 105 110*   < > >140*   CO2 mmol/L 19.0* 20.9* 20.3*   < > 23.2   BUN  mg/dL 7* 5* 7*   < > 68*   CREATININE mg/dL 0.48* 0.52* 0.50*   < > 1.50*   CALCIUM mg/dL 7.6* 7.4* 7.1*   < > 8.2*   BILIRUBIN mg/dL  --   --  1.0  --  1.1   ALK PHOS U/L  --   --  94  --  104   ALT (SGPT) U/L  --   --  42*  --  40*   AST (SGOT) U/L  --   --  55*  --  24   GLUCOSE mg/dL 89 99 96   < > 133*    < > = values in this interval not displayed.       Estimated Creatinine Clearance: 77 mL/min (A) (by C-G formula based on SCr of 0.48 mg/dL (L)).    Results from last 7 days   Lab Units 12/06/22 0358 12/05/22 0359 12/03/22  0413 12/01/22  1209 12/01/22  0222 11/30/22  1943 11/30/22  1826   MAGNESIUM mg/dL  --   --  1.9  --  3.1*  --  2.7*   PHOSPHORUS mg/dL 1.8* 1.8* 1.7*   < > 2.0*   < > 2.5    < > = values in this interval not displayed.             Results from last 7 days   Lab Units 12/06/22 0358 12/05/22 0359 12/04/22  0400 12/03/22  0413 12/02/22  0605   WBC 10*3/mm3 6.21 6.22 5.45 4.97 7.26   HEMOGLOBIN g/dL 11.1* 11.6* 11.1* 9.9* 10.8*   PLATELETS 10*3/mm3 81* 66* 53*  53* 48* 54*             Assessment / Plan     ASSESSMENT:  1.  DUTCH, nonoliguric, resolved:  prerenal due to severe volume depletion and hypotension.  Admission urinalysis: very concentrated, minimal proteinuria, no cells  2.  Hypernatremia due to severe water deficit, in association with change in mental status: slowly better  3.  Reported confusion superimposed on dementia  4.  Hypotension, likely from hypovolemia  5.  COVID infection  6.  Immobility syndrome with bedsores    PLAN:  Volume status seems to be acceptable.  She is hypophosphatemic today.  Will replace phosphorus  Plan for core track placement today      Thank you for involving us in the care of Mary Alice Sanders.  Please feel free to call with any questions.    Jeremy Stone MD  12/06/22  10:01 [facility]    [facility]  [phone]    Please note that portions of this note were completed with a voice recognition program.

## 2022-12-06 NOTE — CONSULTS
"Nutrition Services    Patient Name:  Mary Alice Sanders  YOB: 1935  MRN: [id number]  Admit Date:  11/30/2022      CLINICAL NUTRITION ASSESSMENT    Comments: Consult for tube feeding assessment and cortrak placement. TF ordered last week and cortrak placed. Tube pulled out due to kink and no trained staff available to replace over the weekend. 12/6: Associate RD placed cortrak tube, KUB noted with progression of small bowel gas... poss ileus/obstruction. Administer a few boluses of air when the tube was placed.       TF orders in place: Isosource HN at 10 mL/hr, advance by 10 mL Q 24 hours to goal rate of 55 mL/hr. Water flush 30 mL Q 4 hours. Monitor labs for refeeding syndrome as family had previously indicated pt was eating poorly and only drinking some Ensure (met criteria for severe malnutrition).      RD to follow tolerance, labs and clinical course.     Encounter Information         Reason for Encounter Tube feed assessment     Admitting Diagnosis COVID 19, dementia, hypernatremia, DUTCH, dehydration    Pertinent Medical History     Current Issues Altered mental status, severe malnutrition      Current Nutrition Orders & Evaluation of Intake       Oral Nutrition     Food Allergies Egg products   Current PO Diet NPO Diet NPO Type: Strict NPO   Supplement n/a   PO Evaluation      % PO Intake n/a   --  Anthropometrics          Height    Weight Height: 165.1 cm (65\")  Weight: 59.1 kg (130 lb 3.2 oz) (12/06/22 0552)    BMI kg/m2 Body mass index is 21.67 kg/m².    Weight History  Wt Readings from Last 15 Encounters:   12/06/22 0552 59.1 kg (130 lb 3.2 oz)   12/05/22 0700 56.4 kg (124 lb 6.4 oz)   12/04/22 0515 51.5 kg (113 lb 9.6 oz)   12/03/22 0500 58.9 kg (129 lb 13.6 oz)   12/02/22 0515 53.2 kg (117 lb 4.6 oz)   12/01/22 0423 54.4 kg (119 lb 14.9 oz)   10/07/21 2341 53.8 kg (118 lb 9.7 oz)   10/20/20 2111 66.1 kg (145 lb 11.6 oz)   10/20/20 1219 61.2 kg (135 lb)   09/18/19 1355 59.1 kg (130 lb 6.4 oz) " "  04/11/19 1319 61.5 kg (135 lb 9.6 oz)   06/07/18 1402 69.8 kg (153 lb 12.8 oz)   02/26/18 1402 70.3 kg (155 lb)   01/29/18 1429 68 kg (150 lb)   01/15/18 1407 72.6 kg (160 lb)   09/22/17 1329 69.9 kg (154 lb)   07/19/17 1211 68 kg (150 lb)   06/02/17 1308 70.9 kg (156 lb 6.4 oz)   04/03/17 1253 69.4 kg (153 lb)   09/26/16 1302 64.9 kg (143 lb)   08/08/16 1320 67.4 kg (148 lb 9.6 oz)        Estimated/Assessed Needs        Energy Requirements    Height for Calculation  Height: 165.1 cm (65\")   Weight for Calculation 53 kg (admission)    Method for Estimation  30 kcal/kg, 35 kcal/kg   EST Needs (kcal/day) 6407-8202 kcal/day         Protein Requirements    Weight for Calculation 53 kg    EST Protein Needs (g/kg) 1.2 gm/kg   EST Daily Needs (g/day) 64 gm         Fluid Requirements     Method for Estimation 1 mL/kcal    Estimated Needs (mL/day) 1590     Physical Findings          Physical Appearance generalized wasting, loss of muscle mass, loss of subcutaneous fat, underweight   Oral/Mouth Cavity WNL   Edema  no edema   Gastrointestinal last bowel movement: 126   Skin  pressure injury Bilateral medial sacral spine (deep tissue), bilateral thoracic (deep tissue)    Tubes/Drains NG tube   NFPE Date Completed:  12/1     Labs        Pertinent Labs Reviewed, listed below     Results from last 7 days   Lab Units 12/06/22  0358 12/05/22  0359 12/04/22  0400 11/30/22  1826 11/30/22  1259   SODIUM mmol/L 137 137 140   < > >180*   POTASSIUM mmol/L 3.5 2.8* 3.3*   < > 3.6   CHLORIDE mmol/L 110* 105 110*   < > >140*   CO2 mmol/L 19.0* 20.9* 20.3*   < > 23.2   BUN mg/dL 7* 5* 7*   < > 68*   CREATININE mg/dL 0.48* 0.52* 0.50*   < > 1.50*   CALCIUM mg/dL 7.6* 7.4* 7.1*   < > 8.2*   BILIRUBIN mg/dL  --   --  1.0  --  1.1   ALK PHOS U/L  --   --  94  --  104   ALT (SGPT) U/L  --   --  42*  --  40*   AST (SGOT) U/L  --   --  55*  --  24   GLUCOSE mg/dL 89 99 96   < > 133*    < > = values in this interval not displayed.     Results from " last 7 days   Lab Units 12/06/22  0358 12/04/22  0400 12/03/22  0413 12/01/22  1209 12/01/22  0222 11/30/22  1943 11/30/22  1826   MAGNESIUM mg/dL  --   --  1.9  --  3.1*  --  2.7*   PHOSPHORUS mg/dL 1.8*   < > 1.7*   < > 2.0*   < > 2.5   HEMOGLOBIN g/dL 11.1*   < > 9.9*   < > 15.2  --   --    HEMATOCRIT % 31.0*   < > 29.3*   < > 44.7  --   --    WBC 10*3/mm3 6.21   < > 4.97   < > 10.42  --   --    ALBUMIN g/dL 2.60*   < > 2.20*   < > 3.20*   < > 3.00*    < > = values in this interval not displayed.     Results from last 7 days   Lab Units 12/06/22 0358 12/05/22 0359 12/04/22 0400 12/03/22  0413 12/02/22  0605   PLATELETS 10*3/mm3 81* 66* 53*  53* 48* 54*     COVID19   Date Value Ref Range Status   11/30/2022 Detected (C) Not Detected - Ref. Range Final     Lab Results   Component Value Date    HGBA1C 5.20 03/05/2020          Medications            Scheduled Medications insulin regular, 0-7 Units, Subcutaneous, Q6H  potassium phosphate, 15 mmol, Intravenous, Once  sodium chloride, 10 mL, Intravenous, Q12H        Infusions sodium chloride, 50 mL/hr, Last Rate: 50 mL/hr (12/05/22 1439)        PRN Medications •  acetaminophen **OR** acetaminophen  •  dextrose  •  dextrose  •  glucagon (human recombinant)  •  ipratropium-albuterol  •  ondansetron **OR** ondansetron  •  potassium chloride  •  potassium phosphate infusion greater than 15 mMoles **OR** potassium phosphate **OR** potassium phosphate  •  sodium chloride  •  sodium chloride     PES STATEMENT / NUTRITION DIAGNOSIS      Nutrition Dx Problem  Problem: Needs Alternative Route  Etiology: Functional Diagnosis  Signs/Symptoms: NPO    Comment:      PLAN OF CARE  Intervention Goal        Intervention Goal(s) Meet estimated needs, Initiate TF/PN, Maintain weight and No significant weight loss     Nutrition Intervention         RD Action Follow Tx Progress and Care plan reviewed     Nutrition Prescription          Recommendation      Enteral Prescription:       Enteral Route NG    TF Delivery Method Continuous    Enteral Product Isosource HN    Modular      Propofol Rate/Kcal      TF Start Rate (mL/hr) 10    TF Goal Rate (mL/hr) 55    Free Water Flush (mL) [id number] plus IVF's    TF Provision at Goal: 1584kcal, 71gm protein, 1069mL free water + 180mL water flushes         Calories 99% needs met         Protein  100 % needs met         Fluid (mL) 1249 mL total     Prescription Ordered Yes     Monitor/Evaluation        Monitor Per protocol     RD to follow up per protocol.    Electronically signed by:  Sonal Prajapati RD  12/06/22 14:52 EST

## 2022-12-07 PROBLEM — R41.82 ALTERED MENTAL STATUS: Status: ACTIVE | Noted: 2022-12-07

## 2022-12-07 PROBLEM — N17.9 AKI (ACUTE KIDNEY INJURY) (HCC): Status: ACTIVE | Noted: 2022-12-07

## 2022-12-07 LAB
ALBUMIN SERPL-MCNC: 2.4 G/DL (ref 3.5–5.2)
ANION GAP SERPL CALCULATED.3IONS-SCNC: 11.9 MMOL/L (ref 5–15)
BASOPHILS # BLD AUTO: 0.01 10*3/MM3 (ref 0–0.2)
BASOPHILS NFR BLD AUTO: 0.2 % (ref 0–1.5)
BUN SERPL-MCNC: 9 MG/DL (ref 8–23)
BUN/CREAT SERPL: 22.5 (ref 7–25)
CALCIUM SPEC-SCNC: 7.6 MG/DL (ref 8.6–10.5)
CHLORIDE SERPL-SCNC: 112 MMOL/L (ref 98–107)
CO2 SERPL-SCNC: 18.1 MMOL/L (ref 22–29)
CREAT SERPL-MCNC: 0.4 MG/DL (ref 0.57–1)
DEPRECATED RDW RBC AUTO: 45.7 FL (ref 37–54)
EGFRCR SERPLBLD CKD-EPI 2021: 95.9 ML/MIN/1.73
EOSINOPHIL # BLD AUTO: 0.07 10*3/MM3 (ref 0–0.4)
EOSINOPHIL NFR BLD AUTO: 1.5 % (ref 0.3–6.2)
ERYTHROCYTE [DISTWIDTH] IN BLOOD BY AUTOMATED COUNT: 13.6 % (ref 12.3–15.4)
GLUCOSE BLDC GLUCOMTR-MCNC: 106 MG/DL (ref 70–130)
GLUCOSE BLDC GLUCOMTR-MCNC: 118 MG/DL (ref 70–130)
GLUCOSE BLDC GLUCOMTR-MCNC: 98 MG/DL (ref 70–130)
GLUCOSE SERPL-MCNC: 88 MG/DL (ref 65–99)
HCT VFR BLD AUTO: 29.9 % (ref 34–46.6)
HGB BLD-MCNC: 10.2 G/DL (ref 12–15.9)
LYMPHOCYTES # BLD AUTO: 1.07 10*3/MM3 (ref 0.7–3.1)
LYMPHOCYTES NFR BLD AUTO: 23.7 % (ref 19.6–45.3)
MAGNESIUM SERPL-MCNC: 2 MG/DL (ref 1.6–2.4)
MCH RBC QN AUTO: 32.4 PG (ref 26.6–33)
MCHC RBC AUTO-ENTMCNC: 34.1 G/DL (ref 31.5–35.7)
MCV RBC AUTO: 94.9 FL (ref 79–97)
MONOCYTES # BLD AUTO: 0.47 10*3/MM3 (ref 0.1–0.9)
MONOCYTES NFR BLD AUTO: 10.4 % (ref 5–12)
NEUTROPHILS NFR BLD AUTO: 2.82 10*3/MM3 (ref 1.7–7)
NEUTROPHILS NFR BLD AUTO: 62.4 % (ref 42.7–76)
PHOSPHATE SERPL-MCNC: 2 MG/DL (ref 2.5–4.5)
PHOSPHATE SERPL-MCNC: 2.2 MG/DL (ref 2.5–4.5)
PLATELET # BLD AUTO: 93 10*3/MM3 (ref 140–450)
PMV BLD AUTO: 13.3 FL (ref 6–12)
POTASSIUM SERPL-SCNC: 3.1 MMOL/L (ref 3.5–5.2)
RBC # BLD AUTO: 3.15 10*6/MM3 (ref 3.77–5.28)
SODIUM SERPL-SCNC: 142 MMOL/L (ref 136–145)
WBC NRBC COR # BLD: 4.52 10*3/MM3 (ref 3.4–10.8)

## 2022-12-07 PROCEDURE — 97530 THERAPEUTIC ACTIVITIES: CPT

## 2022-12-07 PROCEDURE — 84132 ASSAY OF SERUM POTASSIUM: CPT | Performed by: HOSPITALIST

## 2022-12-07 PROCEDURE — 84100 ASSAY OF PHOSPHORUS: CPT | Performed by: HOSPITALIST

## 2022-12-07 PROCEDURE — 82962 GLUCOSE BLOOD TEST: CPT

## 2022-12-07 PROCEDURE — 97110 THERAPEUTIC EXERCISES: CPT

## 2022-12-07 PROCEDURE — 83735 ASSAY OF MAGNESIUM: CPT | Performed by: INTERNAL MEDICINE

## 2022-12-07 PROCEDURE — 80069 RENAL FUNCTION PANEL: CPT | Performed by: HOSPITALIST

## 2022-12-07 PROCEDURE — 85025 COMPLETE CBC W/AUTO DIFF WBC: CPT | Performed by: INTERNAL MEDICINE

## 2022-12-07 RX ADMIN — POTASSIUM PHOSPHATE, MONOBASIC AND POTASSIUM PHOSPHATE, DIBASIC 9 MMOL: 224; 236 INJECTION, SOLUTION, CONCENTRATE INTRAVENOUS at 05:04

## 2022-12-07 RX ADMIN — SODIUM CHLORIDE 50 ML/HR: 9 INJECTION, SOLUTION INTRAVENOUS at 20:08

## 2022-12-07 RX ADMIN — Medication 10 ML: at 09:00

## 2022-12-07 NOTE — PROGRESS NOTES
"DAILY PROGRESS NOTE  [facility]    Patient Identification:  Name: Mary Alice Sanders  Age: 87 y.o.  Sex: female  :  1935  MRN: [id number]         Primary Care Physician: Gera Barker MD      Subjective  Patient not communicating.    Objective:  General Appearance:  Comfortable, in no acute distress and not in pain (Very thin and frail.).    Vital signs: (most recent): Blood pressure 108/75, pulse 91, temperature 98 °F (36.7 °C), resp. rate 18, height 165.1 cm (65\"), weight 59.1 kg (130 lb 3.2 oz), SpO2 98 %.    HEENT: (Core track in place.)    Lungs:  Normal effort and normal respiratory rate.  Breath sounds clear to auscultation.    Heart: Normal rate.  Regular rhythm.    Abdomen: Abdomen is soft and non-distended.  Bowel sounds are normal.   There is no abdominal tenderness.     Extremities: There is no dependent edema.    Neurological: (She appears awake.  Degree of eye contact a little better today.  She did actually shake her head up and down to indicate yes once or twice.  On occasion appears to try to mouth a word.  Flat affect.  For the most part tends to look away from examiner.  Not fully cooperating with exam but on the other hand not resisting either.).    Skin:  Warm and dry.                Vital signs in last 24 hours:  Temp:  [98 °F (36.7 °C)-98.8 °F (37.1 °C)] 98 °F (36.7 °C)  Heart Rate:  [] 91  Resp:  [18] 18  BP: (108-113)/(74-89) 108/75    Intake/Output:    Intake/Output Summary (Last 24 hours) at 2022 194  Last data filed at 2022 1830  Gross per 24 hour   Intake 5 ml   Output 550 ml   Net -545 ml         Results from last 7 days   Lab Units 22  0358 22  0359 22  0400 22  0413 22  0605 22  0222 22  1140   WBC 10*3/mm3 6.21 6.22 5.45 4.97 7.26 10.42 11.42*   HEMOGLOBIN g/dL 11.1* 11.6* 11.1* 9.9* 10.8* 15.2 17.2*   PLATELETS 10*3/mm3 81* 66* 53*  53* 48* 54* 73* 101*     Results from last 7 days   Lab Units " 12/06/22  0358 12/05/22  0359 12/04/22  0400 12/03/22  0413 12/02/22  1212 12/02/22  0605 12/02/22  0012   SODIUM mmol/L 137 137 140 142 146* 151* 156*   POTASSIUM mmol/L 3.5 2.8* 3.3* 3.1* 3.5 3.3* 3.5   CHLORIDE mmol/L 110* 105 110* 112* 118* 121* 127*   CO2 mmol/L 19.0* 20.9* 20.3* 19.0* 22.1 21.9* 19.5*   BUN mg/dL 7* 5* 7* 15 20 27* 33*   CREATININE mg/dL 0.48* 0.52* 0.50* 0.47* 0.59 0.56* 0.65   GLUCOSE mg/dL 89 99 96 96 91 117* 124*   Estimated Creatinine Clearance: 77 mL/min (A) (by C-G formula based on SCr of 0.48 mg/dL (L)).  Results from last 7 days   Lab Units 12/06/22  0358 12/05/22  0359 12/04/22  0941 12/04/22  0400 12/03/22  0413 12/02/22  1212 12/02/22  0605 12/02/22  0012 12/01/22  1813 12/01/22  1209 12/01/22  0222 11/30/22  1943 11/30/22  1826   CALCIUM mg/dL 7.6* 7.4*  --  7.1* 6.3* 6.3* 6.8* 6.7* 7.3*   < > 8.3*   < > 7.5*   ALBUMIN g/dL 2.60* 2.70* 2.5* 2.70* 2.20* 2.40* 2.70* 2.50* 2.80*   < > 3.20*   < > 3.00*   MAGNESIUM mg/dL  --   --   --   --  1.9  --   --   --   --   --  3.1*  --  2.7*   PHOSPHORUS mg/dL 1.8* 1.8*  --   --  1.7* 3.4 1.7* 2.0* 3.2   < > 2.0*   < > 2.5    < > = values in this interval not displayed.     Results from last 7 days   Lab Units 12/06/22  0358 12/05/22  0359 12/04/22  0941 12/04/22  0400 12/03/22  0413 12/02/22  1212 12/02/22  0605 11/30/22  1826 11/30/22  1259   ALBUMIN g/dL 2.60* 2.70* 2.5* 2.70* 2.20* 2.40* 2.70*   < > 3.30*   BILIRUBIN mg/dL  --   --   --  1.0  --   --   --   --  1.1   ALK PHOS U/L  --   --   --  94  --   --   --   --  104   AST (SGOT) U/L  --   --   --  55*  --   --   --   --  24   ALT (SGPT) U/L  --   --   --  42*  --   --   --   --  40*    < > = values in this interval not displayed.       Assessment:  Severe dehydration: Resolved.  DUTCH: Secondary to above.  Resolved.  Nephrology input greatly appreciated.  Hypernatremia- severe: Resolved.  Again secondary to severe dehydration.   Hypokalemia/hypophosphatemia: Both also being managed by  nephrology.  Input appreciated.  Potassium down again today.  Hypotension: Likely secondary to low intravascular volume.  Baseline not known but reportedly a history of high blood pressure.  Improved.  Continue hydration.  Severe malnutrition/dysphagia: Core track now in place.  Tube feedings begun.  So far tolerating them well but at a very low rate.  COVID-19 positive: No clinical indication of active disease.  Anemia: Hemoconcentration on presentation.  Now with low hemoglobin.    Improving again today.  Iron panel consistent with anemia of chronic disease.  B12 and folate are normal   Thrombocytopenia:  Beginning to improve again today.  IPF is elevated indicating either consumption or recovery.  Suspect the latter.  B12, folate etc. as above.  Hematology input appreciated.  History of vitamin D toxicity:  Resolved.  DNR:  Abnormal abdominal x-ray: Moderate small bowel dilatation on abdominal film.  However her abdominal exam is completely benign.  Nursing staff also notes normal bowel movements today.  Monitor.      Plan:  Please see above.  Discussed with RN.  Called and discussed with her son over the phone.  The plans are for nasogastric feedings to see if she will perk up.  If she does not then they will reconsider hospice/comfort measures.  They do not wish to pursue PEG placement.    Tony Gregory MD  12/6/2022  19:42 EST

## 2022-12-07 NOTE — NURSING NOTE
"During first round assessment, this RN witnessed pt continuously attempting to pull cortrak. Redirection, repositioning, and distraction methods attempted but pt continued to pull at cortrak. FELICIANO Martinez, notified and order obtained for soft rist restraints. Pts son, Mann, also notified and states he agrees with plan of care, \"she needs all the nutrition she can get.\"   "

## 2022-12-07 NOTE — NURSING NOTE
CWOCN- consult for patient's spine. 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 Today there is the maroon/purple tissue noted and then a few red areas that are blanching. Recommend to continue to protect with silicone border dressings to offer padding for the patient. Patient tolerates turns but is very stiff. RN is going to add a waffle mattress under the patient. She was in CCU on a low air loss and has had an accumax pump on 4S. Patient with poor nutrition, now with tube feeding. Recommend to RN to use a larger silicone border dressing. I placed 2, 4x4 silicone borders along her spine.   Please reconsult C team if additional issues arise.      12/07/22 1057   Wound 11/30/22 1838 Bilateral thoracic spine   Placement Date/Time: 11/30/22 1838   Present on Hospital Admission: Yes  Side: Bilateral  Location: thoracic spine   Wound Image    Pressure Injury Stage DTPI   Dressing Appearance dressing loose   Base maroon/purple   Periwound blanchable;redness   Periwound Temperature warm   Periwound Skin Turgor soft   Drainage Amount none   Care, Wound cleansed with;soap and water   Dressing Care dressing changed;border dressing;silicone

## 2022-12-07 NOTE — THERAPY TREATMENT NOTE
Patient Name: Mary Alice Sanders  : 1935    MRN: [id number]                              Today's Date: 2022       Admit Date: 2022    Visit Dx:     ICD-10-CM ICD-9-CM   1. Hypernatremia  E87.0 276.0   2. Altered mental status, unspecified altered mental status type  R41.82 780.97   3. DUTCH (acute kidney injury) (HCC)  N17.9 584.9   4. Lactic acid acidosis  E87.20 276.2     Patient Active Problem List   Diagnosis   • Blues   • Acid reflux   • Blood glucose elevated   • Hypercholesterolemia   • BP (high blood pressure)   • OP (osteoporosis)   • Allergic rhinitis   • Alzheimer's disease (HCC)   • Vitamin B 12 deficiency   • Cystitis   • Bronchitis   • Vitamin D toxicity   • Complicated UTI (urinary tract infection)   • Hypertension   • Decreased responsiveness   • DNR (do not resuscitate)   • Vasovagal episode   • Malnutrition (HCC)   • Age-related physical debility   • Hypernatremia   • Severe malnutrition (HCC)   • Altered mental status   • DUTCH (acute kidney injury) (HCC)     Past Medical History:   Diagnosis Date   • Allergic rhinitis    • Chest pain    • GERD (gastroesophageal reflux disease)    • Hx of migraine headaches    • Hx: UTI (urinary tract infection)    • Hypertension    • Varicose veins      Past Surgical History:   Procedure Laterality Date   •  SECTION        General Information     Row Name 22 153          Physical Therapy Time and Intention    Document Type therapy note (daily note)  -EB     Mode of Treatment physical therapy;individual therapy  -EB     Row Name 22 1539          General Information    Existing Precautions/Restrictions fall  -EB     Row Name 22 1539          Cognition    Orientation Status (Cognition) --  per nsg, pt non verbal at baseline. will make eye contact with you and will not follow commands.  -EB     Row Name 22 153          Safety Issues, Functional Mobility    Safety Issues Affecting Function (Mobility) ability to follow  commands  -EB     Impairments Affecting Function (Mobility) cognition;endurance/activity tolerance;strength;range of motion (ROM)  -EB           User Key  (r) = Recorded By, (t) = Taken By, (c) = Cosigned By    Initials Name Provider Type    Rhonda Narayan PTA Physical Therapist Assistant               Mobility     Row Name 12/07/22 1540          Bed Mobility    Bed Mobility rolling left;rolling right  -EB     Rolling Left Dale (Bed Mobility) dependent (less than 25% patient effort)  -EB     Rolling Right [place] (Bed Mobility) dependent (less than 25% patient effort)  -EB     Comment, (Bed Mobility) deferred further bed mobility as pt would not follow commands or assist with rolling. Nursing assistant needed to put air pad under patient.  -EB           User Key  (r) = Recorded By, (t) = Taken By, (c) = Cosigned By    Initials Name Provider Type    Rhonda Narayan PTA Physical Therapist Assistant               Obj/Interventions     Row Name 12/07/22 1542          Motor Skills    Therapeutic Exercise --  BLE: AP, HS, hip abd/add (X10) BUE: elbow flex/ext  -EB           User Key  (r) = Recorded By, (t) = Taken By, (c) = Cosigned By    Initials Name Provider Type    Rhonda Narayan PTA Physical Therapist Assistant               Goals/Plan    No documentation.                Clinical Impression     Row Name 12/07/22 1542          Plan of Care Review    Plan of Care Reviewed With patient  -EB     Progress no change  -EB     Outcome Evaluation Pt seen by PT today. Pt is non verbal and does not follow commands well. Assisted nursing staff with bedding needs. Pt would not assist with rolling and is dependent with rolling left or right. PROM performed on BLE and BUE today as pt would not assist despite max verbal and tactile cueing. Will continue to follow patient and progress as able.  -EB     Row Name 12/07/22 1542          Therapy Assessment/Plan (PT)    Therapy Frequency (PT) 5 times/wk  -EB     Row  Name 12/07/22 1542          Positioning and Restraints    Pre-Treatment Position in bed  -EB     Post Treatment Position bed  -EB     In Bed supine;call light within reach;encouraged to call for assist;exit alarm on  -EB     Restraints reapplied:;soft limb  bilateral wrist  -EB           User Key  (r) = Recorded By, (t) = Taken By, (c) = Cosigned By    Initials Name Provider Type    Rhonda Narayan PTA Physical Therapist Assistant               Outcome Measures     Row Name 12/07/22 1545 12/07/22 0856       How much help from another person do you currently need...    Turning from your back to your side while in flat bed without using bedrails? 1  -EB 1  -AH    Moving from lying on back to sitting on the side of a flat bed without bedrails? 1  -EB 1  -AH    Moving to and from a bed to a chair (including a wheelchair)? 1  -EB 1  -AH    Standing up from a chair using your arms (e.g., wheelchair, bedside chair)? 1  -EB 1  -AH    Climbing 3-5 steps with a railing? 1  -EB 1  -AH    To walk in hospital room? 1  -EB 1  -AH    AM-PAC 6 Clicks Score (PT) 6  -EB 6  -AH    Highest level of mobility 2 --> Bed activities/dependent transfer  -EB 2 --> Bed activities/dependent transfer  -AH          User Key  (r) = Recorded By, (t) = Taken By, (c) = Cosigned By    Initials Name Provider Type    Rhonda Narayan PTA Physical Therapist Assistant    Radha Jenkins RN Registered Nurse                             Physical Therapy Education     Title: PT OT SLP Therapies (In Progress)     Topic: Physical Therapy (In Progress)     Point: Mobility training (In Progress)     Learning Progress Summary           Patient Acceptance, E,D, NL,NR by  at 12/7/2022 1545    Acceptance, E,TB,D, VU,NR by  at 12/5/2022 1532                   Point: Home exercise program (In Progress)     Learning Progress Summary           Patient Acceptance, E,D, NL,NR by  at 12/7/2022 1545    Acceptance, E,TB,D, VU,NR by  at 12/5/2022 1532                    Point: Body mechanics (In Progress)     Learning Progress Summary           Patient Acceptance, E,D, NL,NR by  at 12/7/2022 1545    Acceptance, E,TB,D, VU,NR by  at 12/5/2022 1532                   Point: Precautions (Done)     Learning Progress Summary           Patient Acceptance, E,TB,D, VU,NR by  at 12/5/2022 1532                               User Key     Initials Effective Dates Name Provider Type Discipline     06/16/21 -  Cheyenne Devries PT Physical Therapist PT     06/16/21 -  Rhonda Scott PTA Physical Therapist Assistant PT              PT Recommendation and Plan     Plan of Care Reviewed With: patient  Progress: no change  Outcome Evaluation: Pt seen by PT today. Pt is non verbal and does not follow commands well. Assisted nursing staff with bedding needs. Pt would not assist with rolling and is dependent with rolling left or right. PROM performed on BLE and BUE today as pt would not assist despite max verbal and tactile cueing. Will continue to follow patient and progress as able.     Time Calculation:    PT Charges     Row Name 12/07/22 1538             Time Calculation    Start Time 1338  -EB      Stop Time 1403  -EB      Time Calculation (min) 25 min  -EB      PT Received On 12/07/22  -EB      PT - Next Appointment 12/08/22  -EB         Time Calculation- PT    Total Timed Code Minutes- PT 25 minute(s)  -            User Key  (r) = Recorded By, (t) = Taken By, (c) = Cosigned By    Initials Name Provider Type     Rhonda Scott PTA Physical Therapist Assistant              Therapy Charges for Today     Code Description Service Date Service Provider Modifiers Qty    57688177011 HC PT THER PROC EA 15 MIN 12/7/2022 Rhonda Scott PTA GP 1    93274146679 HC PT THERAPEUTIC ACT EA 15 MIN 12/7/2022 Rhonda Scott PTA GP 1    89877014087 HC PT THER SUPP EA 15 MIN 12/7/2022 Rhonda Scott PTA GP 1          PT G-Codes  Outcome Measure Options: AM-PAC 6 Clicks Basic Mobility  (PT)  AM-PAC 6 Clicks Score (PT): 6  AM-PAC 6 Clicks Score (OT): 6       Rhonda Scott PTA  12/7/2022

## 2022-12-07 NOTE — PROGRESS NOTES
[facility] Progress Note      Patient Name: Mary Alice Sanders  : 1935  MRN: [id number]  Primary Care Physician:  Gera Barker MD  Date of admission: 2022    Subjective     Interval History:   Follow up DUTCH, hypernatremia. In restraints.  On tube feeds. Not verbal.   Just completed bath.   Review of Systems:   As noted above    Objective     Vitals:   Temp:  [97.4 °F (36.3 °C)-99 °F (37.2 °C)] 99 °F (37.2 °C)  Heart Rate:  [87-93] 88  Resp:  [16-19] 18  BP: (101-114)/(55-75) 101/70  Flow (L/min):  [4] 4    Intake/Output Summary (Last 24 hours) at 2022 1102  Last data filed at 2022 2120  Gross per 24 hour   Intake 15 ml   Output 550 ml   Net -535 ml       Physical Exam:    General Appearance: Very frail. In restraints. Flat affect.   Skin: warm and dry  HEENT: oral mucosa dry. cortrak  Neck: supple, no JVD  Lungs: Clear to auscultation   Heart: RRR, normal S1 and S2  Abdomen: soft, nontender, non-distended. + bs  Extremities: no edema.   Neuro: eyes open. Does not verbalize.   : vangie  Scheduled Meds:     insulin regular, 0-7 Units, Subcutaneous, Q6H  potassium phosphate, 15 mmol, Intravenous, Once  sodium chloride, 10 mL, Intravenous, Q12H      IV Meds:   sodium chloride, 50 mL/hr, Last Rate: 50 mL/hr (22 1439)        Results Reviewed:   I have personally reviewed the results from the time of this admission to 2022 11:02 EST     Results from last 7 days   Lab Units 22  0406 22  0358 22  0359 22  0400 22  1826 22  1259   SODIUM mmol/L 142 137 137 140   < > >180*   POTASSIUM mmol/L 3.1* 3.5 2.8* 3.3*   < > 3.6   CHLORIDE mmol/L 112* 110* 105 110*   < > >140*   CO2 mmol/L 18.1* 19.0* 20.9* 20.3*   < > 23.2   BUN mg/dL 9 7* 5* 7*   < > 68*   CREATININE mg/dL 0.40* 0.48* 0.52* 0.50*   < > 1.50*   CALCIUM mg/dL 7.6* 7.6* 7.4* 7.1*   < > 8.2*   BILIRUBIN mg/dL  --   --   --  1.0  --  1.1   ALK PHOS U/L  --   --   --  94   --  104   ALT (SGPT) U/L  --   --   --  42*  --  40*   AST (SGOT) U/L  --   --   --  55*  --  24   GLUCOSE mg/dL 88 89 99 96   < > 133*    < > = values in this interval not displayed.       Estimated Creatinine Clearance: 93.9 mL/min (A) (by C-G formula based on SCr of 0.4 mg/dL (L)).    Results from last 7 days   Lab Units 12/07/22  0406 12/06/22  2300 12/06/22  0358 12/05/22  0359 12/03/22  0413 12/01/22  1209 12/01/22  0222 11/30/22  1943 11/30/22  1826   MAGNESIUM mg/dL  --   --   --   --  1.9  --  3.1*  --  2.7*   PHOSPHORUS mg/dL 2.0* 2.2* 1.8*   < > 1.7*   < > 2.0*   < > 2.5    < > = values in this interval not displayed.             Results from last 7 days   Lab Units 12/07/22  0406 12/06/22  0358 12/05/22  0359 12/04/22  0400 12/03/22 0413   WBC 10*3/mm3 4.52 6.21 6.22 5.45 4.97   HEMOGLOBIN g/dL 10.2* 11.1* 11.6* 11.1* 9.9*   PLATELETS 10*3/mm3 93* 81* 66* 53*  53* 48*             Assessment / Plan     ASSESSMENT:  1. Bibiana, severe volume depletion, hypotension. Resolved  Replace K and phos per protocol.  2. Hypernatremia. Getting free water per cortrak.   3. Anemia TCP  4. Protein calorie malnutrition.  Tube feeds.  5. COVID 19    PLAN:  1. Continue K and phos protocols.  2. Renal will sign off. Please call with questions.     Crystal Valenzuela MD  12/07/22  11:02 [facility]    [facility]  [phone]

## 2022-12-07 NOTE — PLAN OF CARE
Goal Outcome Evaluation:  Plan of Care Reviewed With: patient        Progress: no change  Outcome Evaluation: Pt seen by PT today. Pt is non verbal and does not follow commands well. Assisted nursing staff with bedding needs. Pt would not assist with rolling and is dependent with rolling left or right. PROM performed on BLE and BUE today as pt would not assist despite max verbal and tactile cueing. Will continue to follow patient and progress as able.    ..Patient was not wearing a face mask during this therapy encounter. Therapist used appropriate personal protective equipment including gown, eye protection, mask and gloves.  Mask used was N95/duckbill. Appropriate PPE was worn during the entire therapy session. Hand hygiene was completed before and after therapy session. Patient is in enhanced droplet precautions.

## 2022-12-07 NOTE — PLAN OF CARE
Goal Outcome Evaluation:  Plan of Care Reviewed With: patient           Outcome Evaluation: See previous note regarding indications for restraints, not tolerating discontinuation at this time. VSS. Yells out when turned. Incontince care PRN. Tolerating TF. IVF continued. Phos redrawn, remains low, replacing per protocol. Turning q2h.

## 2022-12-07 NOTE — PROGRESS NOTES
"DAILY PROGRESS NOTE  [facility]    Patient Identification:  Name: Mary Alice Sanders  Age: 87 y.o.  Sex: female  :  1935  MRN: [id number]         Primary Care Physician: Gera Barker MD    Subjective:  Interval History: The patient is confused and not talking.  Objective:    Scheduled Meds:insulin regular, 0-7 Units, Subcutaneous, Q6H  potassium phosphate, 15 mmol, Intravenous, Once  sodium chloride, 10 mL, Intravenous, Q12H      Continuous Infusions:sodium chloride, 50 mL/hr, Last Rate: 50 mL/hr (22 1439)        Vital signs in last 24 hours:  Temp:  [97.4 °F (36.3 °C)-99 °F (37.2 °C)] 99 °F (37.2 °C)  Heart Rate:  [87-93] 88  Resp:  [16-19] 18  BP: (101-114)/(55-73) 101/70    Intake/Output:    Intake/Output Summary (Last 24 hours) at 2022 1309  Last data filed at 2022 2120  Gross per 24 hour   Intake 10 ml   Output 550 ml   Net -540 ml       Exam:  /70 (BP Location: Left arm, Patient Position: Lying)   Pulse 88   Temp 99 °F (37.2 °C) (Oral)   Resp 18   Ht 165.1 cm (65\")   Wt 60 kg (132 lb 3.2 oz)   SpO2 95%   BMI 22.00 kg/m²     General Appearance:   Confused, no distress   Head:    Normocephalic, without obvious abnormality, atraumatic   Eyes:       Throat:   Lips, tongue, gums normal   Neck:   Supple, symmetrical, trachea midline, no JVD   Lungs:     Clear to auscultation bilaterally, respirations unlabored   Chest Wall:    No tenderness or deformity    Heart:    Regular rate and rhythm, S1 and S2 normal, no murmur,no  Rub or gallop   Abdomen:     Soft, nontender, bowel sounds active, no masses, no organomegaly    Extremities:   Extremities normal, atraumatic, no cyanosis or edema   Pulses:      Skin:   Skin is warm and dry,  no rashes or palpable lesions   Neurologic:   no focal deficits noted, confused and demented.  Patient is not talking.      Lab Results (last 72 hours)     Procedure Component Value Units Date/Time    Magnesium [[id number]]  (Normal) " Collected: 12/07/22 0406    Specimen: Blood Updated: 12/07/22 1131     Magnesium 2.0 mg/dL     POC Glucose Once [[id number]]  (Normal) Collected: 12/07/22 1104    Specimen: Blood Updated: 12/07/22 1105     Glucose 118 mg/dL      Comment: Meter: [id number] : [id number] Charlotte Damian NA       POC Glucose Once [[id number]]  (Normal) Collected: 12/07/22 0633    Specimen: Blood Updated: 12/07/22 0634     Glucose 98 mg/dL      Comment: Meter: [id number] : [id number] Osorio PLATT       Renal Function Panel [[id number]]  (Abnormal) Collected: 12/07/22 0406    Specimen: Blood Updated: 12/07/22 0447     Glucose 88 mg/dL      BUN 9 mg/dL      Creatinine 0.40 mg/dL      Sodium 142 mmol/L      Potassium 3.1 mmol/L      Chloride 112 mmol/L      CO2 18.1 mmol/L      Calcium 7.6 mg/dL      Albumin 2.40 g/dL      Phosphorus 2.0 mg/dL      Anion Gap 11.9 mmol/L      BUN/Creatinine Ratio 22.5     eGFR 95.9 mL/min/1.73      Comment: National Kidney Foundation and American Society of Nephrology (ASN) Task Force recommended calculation based on the Chronic Kidney Disease Epidemiology Collaboration (CKD-EPI) equation refit without adjustment for race.       Narrative:      GFR Normal >60  Chronic Kidney Disease <60  Kidney Failure <15    The GFR formula is only valid for adults with stable renal function between ages 18 and 70.    CBC & Differential [[id number]]  (Abnormal) Collected: 12/07/22 0406    Specimen: Blood Updated: 12/07/22 0440    Narrative:      The following orders were created for panel order CBC & Differential.  Procedure                               Abnormality         Status                     ---------                               -----------         ------                     CBC Auto Differential[[id number]]        Abnormal            Final result                 Please view results for these tests on the individual orders.    CBC Auto Differential [[id number]]  (Abnormal) Collected: 12/07/22 0406     Specimen: Blood Updated: 12/07/22 0440     WBC 4.52 10*3/mm3      RBC 3.15 10*6/mm3      Hemoglobin 10.2 g/dL      Hematocrit 29.9 %      MCV 94.9 fL      MCH 32.4 pg      MCHC 34.1 g/dL      RDW 13.6 %      RDW-SD 45.7 fl      MPV 13.3 fL      Platelets 93 10*3/mm3      Neutrophil % 62.4 %      Lymphocyte % 23.7 %      Monocyte % 10.4 %      Eosinophil % 1.5 %      Basophil % 0.2 %      Neutrophils, Absolute 2.82 10*3/mm3      Lymphocytes, Absolute 1.07 10*3/mm3      Monocytes, Absolute 0.47 10*3/mm3      Eosinophils, Absolute 0.07 10*3/mm3      Basophils, Absolute 0.01 10*3/mm3     Phosphorus [[id number]]  (Abnormal) Collected: 12/06/22 2300    Specimen: Blood Updated: 12/06/22 2333     Phosphorus 2.2 mg/dL     POC Glucose Once [[id number]]  (Normal) Collected: 12/06/22 2319    Specimen: Blood Updated: 12/06/22 2320     Glucose 96 mg/dL      Comment: Meter: [id number] : [id number] Osorio Kaye NA       POC Glucose Once [[id number]]  (Normal) Collected: 12/06/22 2014    Specimen: Blood Updated: 12/06/22 2016     Glucose 96 mg/dL      Comment: Meter: [id number] : [id number] Osorio Kaye NA       POC Glucose Once [[id number]]  (Normal) Collected: 12/06/22 1611    Specimen: Blood Updated: 12/06/22 1612     Glucose 88 mg/dL      Comment: Meter: [id number] : [id number] Aaerike Rickie NA       DESHAWN + PE [[id number]]  (Abnormal) Collected: 12/04/22 0941    Specimen: Blood Updated: 12/06/22 1414     IgG 593 mg/dL      IgA 161 mg/dL      IgM 159 mg/dL      Total Protein 4.8 g/dL      Albumin 2.5 g/dL      Alpha-1-Globulin 0.3 g/dL      Alpha-2-Globulin 0.7 g/dL      Beta Globulin 0.7 g/dL      Gamma Globulin 0.5 g/dL      M-Wing Not Observed g/dL      Globulin 2.3 g/dL      A/G Ratio 1.1     Immunofixation Reflex, Serum Comment     Comment: No monoclonality detected.        Please note Comment     Comment: Protein electrophoresis scan will follow via computer, mail, or   delivery.       Narrative:       Performed at:  [address] [place] [address]  [id number]  : Payam Monteiro PhD, Phone:  [phone]    POC Glucose Once [[id number]]  (Normal) Collected: 12/06/22 1100    Specimen: Blood Updated: 12/06/22 1101     Glucose 102 mg/dL      Comment: Meter: [id number] : [id number] Charlotte PLATT       CBC & Differential [[id number]]  (Abnormal) Collected: 12/06/22 0358    Specimen: Blood Updated: 12/06/22 0654    Narrative:      The following orders were created for panel order CBC & Differential.  Procedure                               Abnormality         Status                     ---------                               -----------         ------                     CBC Auto Differential[[id number]]        Abnormal            Final result                 Please view results for these tests on the individual orders.    CBC Auto Differential [[id number]]  (Abnormal) Collected: 12/06/22 0358    Specimen: Blood Updated: 12/06/22 0654     WBC 6.21 10*3/mm3      RBC 3.43 10*6/mm3      Hemoglobin 11.1 g/dL      Hematocrit 31.0 %      MCV 90.4 fL      MCH 32.4 pg      MCHC 35.8 g/dL      RDW 13.0 %      RDW-SD 42.2 fl      MPV 14.3 fL      Platelets 81 10*3/mm3      Neutrophil % 70.1 %      Lymphocyte % 18.4 %      Monocyte % 8.9 %      Eosinophil % 0.8 %      Basophil % 0.2 %      Neutrophils, Absolute 4.36 10*3/mm3      Lymphocytes, Absolute 1.14 10*3/mm3      Monocytes, Absolute 0.55 10*3/mm3      Eosinophils, Absolute 0.05 10*3/mm3      Basophils, Absolute 0.01 10*3/mm3     Renal Function Panel [[id number]]  (Abnormal) Collected: 12/06/22 0358    Specimen: Blood Updated: 12/06/22 0600     Glucose 89 mg/dL      BUN 7 mg/dL      Creatinine 0.48 mg/dL      Sodium 137 mmol/L      Potassium 3.5 mmol/L      Chloride 110 mmol/L      CO2 19.0 mmol/L      Calcium 7.6 mg/dL      Albumin 2.60 g/dL      Phosphorus 1.8 mg/dL      Anion Gap 8.0 mmol/L      BUN/Creatinine Ratio 14.6     eGFR 91.8  mL/min/1.73      Comment: National Kidney Foundation and American Society of Nephrology (ASN) Task Force recommended calculation based on the Chronic Kidney Disease Epidemiology Collaboration (CKD-EPI) equation refit without adjustment for race.       Narrative:      GFR Normal >60  Chronic Kidney Disease <60  Kidney Failure <15    The GFR formula is only valid for adults with stable renal function between ages 18 and 70.    POC Glucose Once [[id number]]  (Normal) Collected: 12/06/22 0533    Specimen: Blood Updated: 12/06/22 0534     Glucose 95 mg/dL      Comment: Meter: [id number] : [id number] Osorio Kaye NA       POC Glucose Once [[id number]]  (Normal) Collected: 12/06/22 0008    Specimen: Blood Updated: 12/06/22 0009     Glucose 113 mg/dL      Comment: Meter: [id number] : [id number] Osorio Kaye NA       POC Glucose Once [[id number]]  (Normal) Collected: 12/05/22 2006    Specimen: Blood Updated: 12/05/22 2007     Glucose 101 mg/dL      Comment: Meter: [id number] : [id number] Osorio Kaye NA       POC Glucose Once [[id number]]  (Normal) Collected: 12/05/22 1607    Specimen: Blood Updated: 12/05/22 1609     Glucose 100 mg/dL      Comment: Meter: [id number] : [id number] [employer]arden Pharmaceuticalsey PCA       Blood Culture - Blood, Arm, Left [[id number]]  (Normal) Collected: 11/30/22 1237    Specimen: Blood from Arm, Left Updated: 12/05/22 1315     Blood Culture No growth at 5 days    Blood Culture - Blood, Arm, Right [[id number]]  (Normal) Collected: 11/30/22 1140    Specimen: Blood from Arm, Right Updated: 12/05/22 1200     Blood Culture No growth at 5 days    POC Glucose Once [[id number]]  (Normal) Collected: 12/05/22 1115    Specimen: Blood Updated: 12/05/22 1117     Glucose 125 mg/dL      Comment: Meter: [id number] : [id number] [employer] PCA       POC Glucose Once [[id number]]  (Normal) Collected: 12/05/22 0706    Specimen: Blood Updated: 12/05/22 0707     Glucose 114 mg/dL      Comment: Meter:  [id number] : [id number] Finesse PLATT       Renal Function Panel [[id number]]  (Abnormal) Collected: 12/05/22 0359    Specimen: Blood Updated: 12/05/22 0458     Glucose 99 mg/dL      BUN 5 mg/dL      Creatinine 0.52 mg/dL      Sodium 137 mmol/L      Potassium 2.8 mmol/L      Chloride 105 mmol/L      CO2 20.9 mmol/L      Calcium 7.4 mg/dL      Albumin 2.70 g/dL      Phosphorus 1.8 mg/dL      Anion Gap 11.1 mmol/L      BUN/Creatinine Ratio 9.6     eGFR 90.1 mL/min/1.73      Comment: National Kidney Foundation and American Society of Nephrology (ASN) Task Force recommended calculation based on the Chronic Kidney Disease Epidemiology Collaboration (CKD-EPI) equation refit without adjustment for race.       Narrative:      GFR Normal >60  Chronic Kidney Disease <60  Kidney Failure <15    The GFR formula is only valid for adults with stable renal function between ages 18 and 70.    CBC & Differential [[id number]]  (Abnormal) Collected: 12/05/22 0359    Specimen: Blood Updated: 12/05/22 0450    Narrative:      The following orders were created for panel order CBC & Differential.  Procedure                               Abnormality         Status                     ---------                               -----------         ------                     CBC Auto Differential[[id number]]        Abnormal            Final result                 Please view results for these tests on the individual orders.    CBC Auto Differential [[id number]]  (Abnormal) Collected: 12/05/22 0359    Specimen: Blood Updated: 12/05/22 0450     WBC 6.22 10*3/mm3      RBC 3.61 10*6/mm3      Hemoglobin 11.6 g/dL      Hematocrit 34.1 %      MCV 94.5 fL      MCH 32.1 pg      MCHC 34.0 g/dL      RDW 13.1 %      RDW-SD 44.6 fl      MPV 14.3 fL      Platelets 66 10*3/mm3      Neutrophil % 68.2 %      Lymphocyte % 20.1 %      Monocyte % 8.5 %      Eosinophil % 1.1 %      Basophil % 0.3 %      Neutrophils, Absolute 4.24 10*3/mm3      Lymphocytes, Absolute  1.25 10*3/mm3      Monocytes, Absolute 0.53 10*3/mm3      Eosinophils, Absolute 0.07 10*3/mm3      Basophils, Absolute 0.02 10*3/mm3     POC Glucose Once [[id number]]  (Normal) Collected: 12/04/22 2119    Specimen: Blood Updated: 12/04/22 2120     Glucose 108 mg/dL      Comment: Meter: [id number] : [id number] Finesse PLATT       POC Glucose Once [[id number]]  (Normal) Collected: 12/04/22 1614    Specimen: Blood Updated: 12/04/22 1617     Glucose 122 mg/dL      Comment: Meter: [id number] : [id number] Lloyd Meraz CNA           Data Review:  Results from last 7 days   Lab Units 12/07/22  0406 12/06/22  0358 12/05/22 0359   SODIUM mmol/L 142 137 137   POTASSIUM mmol/L 3.1* 3.5 2.8*   CHLORIDE mmol/L 112* 110* 105   CO2 mmol/L 18.1* 19.0* 20.9*   BUN mg/dL 9 7* 5*   CREATININE mg/dL 0.40* 0.48* 0.52*   GLUCOSE mg/dL 88 89 99   CALCIUM mg/dL 7.6* 7.6* 7.4*     Results from last 7 days   Lab Units 12/07/22  0406 12/06/22  0358 12/05/22 0359   WBC 10*3/mm3 4.52 6.21 6.22   HEMOGLOBIN g/dL 10.2* 11.1* 11.6*   HEMATOCRIT % 29.9* 31.0* 34.1   PLATELETS 10*3/mm3 93* 81* 66*             Lab Results   Lab Value Date/Time    TROPONINT 0.028 11/30/2022 1140         Results from last 7 days   Lab Units 12/04/22  0400   ALK PHOS U/L 94   BILIRUBIN mg/dL 1.0   ALT (SGPT) U/L 42*   AST (SGOT) U/L 55*             Glucose   Date/Time Value Ref Range Status   12/07/2022 1104 118 70 - 130 mg/dL Final     Comment:     Meter: [id number] : [id number] Katieeriksam Rickie NA   12/07/2022 0633 98 70 - 130 mg/dL Final     Comment:     Meter: [id number] : [id number] Osorio Kaye NA   12/06/2022 2319 96 70 - 130 mg/dL Final     Comment:     Meter: [id number] : [id number] Osorio Kaye NA   12/06/2022 2014 96 70 - 130 mg/dL Final     Comment:     Meter: [id number] : [id number] Osorio Kaye NA   12/06/2022 1611 88 70 - 130 mg/dL Final     Comment:     Meter: [id number] : [id number] Aaluis eduardo Rickie NA   12/06/2022 1100  102 70 - 130 mg/dL Final     Comment:     Meter: [id number] : [id number] Charlotte Damian NA   12/06/2022 0533 95 70 - 130 mg/dL Final     Comment:     Meter: [id number] : [id number] Osorio Restrepo NA   12/06/2022 0008 113 70 - 130 mg/dL Final     Comment:     Meter: [id number] : [id number] Osorio Restrepo NA           Past Medical History:   Diagnosis Date   • Allergic rhinitis    • Chest pain    • GERD (gastroesophageal reflux disease)    • Hx of migraine headaches    • Hx: UTI (urinary tract infection)    • Hypertension    • Varicose veins        Assessment:  Active Hospital Problems    Diagnosis  POA   • **Hypernatremia [E87.0]  Yes   • Severe malnutrition (HCC) [E43]  Yes      Resolved Hospital Problems   No resolved problems to display.       Plan:  [place] appears to be poor.  Family wants to continue with tube feedings and see how she does.  We will continue and follow-up lab.  Family reportedly not ready to go for palliative care yet.    Morgan Mcrae MD  12/7/2022  13:09 EST

## 2022-12-08 LAB
ALBUMIN SERPL-MCNC: 2.5 G/DL (ref 3.5–5.2)
ANION GAP SERPL CALCULATED.3IONS-SCNC: 8.1 MMOL/L (ref 5–15)
BASOPHILS # BLD AUTO: 0.01 10*3/MM3 (ref 0–0.2)
BASOPHILS NFR BLD AUTO: 0.2 % (ref 0–1.5)
BUN SERPL-MCNC: 8 MG/DL (ref 8–23)
BUN/CREAT SERPL: 18.6 (ref 7–25)
CALCIUM SPEC-SCNC: 8 MG/DL (ref 8.6–10.5)
CHLORIDE SERPL-SCNC: 110 MMOL/L (ref 98–107)
CO2 SERPL-SCNC: 21.9 MMOL/L (ref 22–29)
CREAT SERPL-MCNC: 0.43 MG/DL (ref 0.57–1)
DEPRECATED RDW RBC AUTO: 45.4 FL (ref 37–54)
EGFRCR SERPLBLD CKD-EPI 2021: 94.3 ML/MIN/1.73
EOSINOPHIL # BLD AUTO: 0.09 10*3/MM3 (ref 0–0.4)
EOSINOPHIL NFR BLD AUTO: 1.8 % (ref 0.3–6.2)
ERYTHROCYTE [DISTWIDTH] IN BLOOD BY AUTOMATED COUNT: 13.4 % (ref 12.3–15.4)
GLUCOSE BLDC GLUCOMTR-MCNC: 104 MG/DL (ref 70–130)
GLUCOSE BLDC GLUCOMTR-MCNC: 108 MG/DL (ref 70–130)
GLUCOSE BLDC GLUCOMTR-MCNC: 112 MG/DL (ref 70–130)
GLUCOSE BLDC GLUCOMTR-MCNC: 115 MG/DL (ref 70–130)
GLUCOSE BLDC GLUCOMTR-MCNC: 117 MG/DL (ref 70–130)
GLUCOSE SERPL-MCNC: 104 MG/DL (ref 65–99)
HCT VFR BLD AUTO: 31.2 % (ref 34–46.6)
HGB BLD-MCNC: 10.5 G/DL (ref 12–15.9)
IMM GRANULOCYTES # BLD AUTO: 0.08 10*3/MM3 (ref 0–0.05)
IMM GRANULOCYTES NFR BLD AUTO: 1.6 % (ref 0–0.5)
LYMPHOCYTES # BLD AUTO: 1.37 10*3/MM3 (ref 0.7–3.1)
LYMPHOCYTES NFR BLD AUTO: 27.6 % (ref 19.6–45.3)
MCH RBC QN AUTO: 31.7 PG (ref 26.6–33)
MCHC RBC AUTO-ENTMCNC: 33.7 G/DL (ref 31.5–35.7)
MCV RBC AUTO: 94.3 FL (ref 79–97)
MONOCYTES # BLD AUTO: 0.51 10*3/MM3 (ref 0.1–0.9)
MONOCYTES NFR BLD AUTO: 10.3 % (ref 5–12)
NEUTROPHILS NFR BLD AUTO: 2.91 10*3/MM3 (ref 1.7–7)
NEUTROPHILS NFR BLD AUTO: 58.5 % (ref 42.7–76)
NRBC BLD AUTO-RTO: 0 /100 WBC (ref 0–0.2)
PHOSPHATE SERPL-MCNC: 1.9 MG/DL (ref 2.5–4.5)
PHOSPHATE SERPL-MCNC: 2.2 MG/DL (ref 2.5–4.5)
PHOSPHATE SERPL-MCNC: 2.7 MG/DL (ref 2.5–4.5)
PLATELET # BLD AUTO: 108 10*3/MM3 (ref 140–450)
PMV BLD AUTO: 12.5 FL (ref 6–12)
POTASSIUM SERPL-SCNC: 3.5 MMOL/L (ref 3.5–5.2)
POTASSIUM SERPL-SCNC: 3.5 MMOL/L (ref 3.5–5.2)
RBC # BLD AUTO: 3.31 10*6/MM3 (ref 3.77–5.28)
SODIUM SERPL-SCNC: 140 MMOL/L (ref 136–145)
WBC NRBC COR # BLD: 4.97 10*3/MM3 (ref 3.4–10.8)

## 2022-12-08 PROCEDURE — 80069 RENAL FUNCTION PANEL: CPT | Performed by: INTERNAL MEDICINE

## 2022-12-08 PROCEDURE — 84100 ASSAY OF PHOSPHORUS: CPT | Performed by: HOSPITALIST

## 2022-12-08 PROCEDURE — 85025 COMPLETE CBC W/AUTO DIFF WBC: CPT | Performed by: INTERNAL MEDICINE

## 2022-12-08 PROCEDURE — 82962 GLUCOSE BLOOD TEST: CPT

## 2022-12-08 RX ADMIN — POTASSIUM PHOSPHATE, MONOBASIC AND POTASSIUM PHOSPHATE, DIBASIC 9 MMOL: 224; 236 INJECTION, SOLUTION, CONCENTRATE INTRAVENOUS at 17:45

## 2022-12-08 RX ADMIN — Medication 10 ML: at 20:48

## 2022-12-08 RX ADMIN — Medication 10 ML: at 09:17

## 2022-12-08 RX ADMIN — SODIUM CHLORIDE 50 ML/HR: 9 INJECTION, SOLUTION INTRAVENOUS at 17:44

## 2022-12-08 RX ADMIN — POTASSIUM PHOSPHATE, MONOBASIC AND POTASSIUM PHOSPHATE, DIBASIC 9 MMOL: 224; 236 INJECTION, SOLUTION, CONCENTRATE INTRAVENOUS at 02:33

## 2022-12-08 NOTE — CASE MANAGEMENT/SOCIAL WORK
Continued Stay Note  [facility]     Patient Name: Mary Alice Sanders  MRN: [id number]  Today's Date: 12/8/2022    Admit Date: 11/30/2022    Plan: TBD   Discharge Plan     Row Name 12/08/22 1106       Plan    Plan TBD    Patient/Family in Agreement with Plan yes    Plan Comments Clinicals reviewed. CCP continues to follow for medical readiness for DC planning. CCP to follow up on Monday. Davion RN/CP               Discharge Codes    No documentation.               Expected Discharge Date and Time     Expected Discharge Date Expected Discharge Time    Dec 9, 2022             Ana Leslie RN

## 2022-12-08 NOTE — PLAN OF CARE
Goal Outcome Evaluation:           Progress: no change     Patient remains in bilateral wrist restraints due to pulling at Cortrak. Patient confused and non-verbal only will say simple words such as no and yes. Patient on Isosource HN @ 20ml continuous and has tolerated well at 1700 increased to 30ml with no issues. Has received potassium phosphate due to lab showing 2.2 on phos.

## 2022-12-08 NOTE — SIGNIFICANT NOTE
12/08/22 1414   OTHER   Discipline occupational therapist   Rehab Time/Intention   Session Not Performed   (discuss with RN and CCP. Pt has not been following commands,dependent for rolling.  Son wants to see if condition improves with tube feeds.  Will hold OT at this time and follow up for status in a few days.)   Recommendation   OT - Next Appointment 12/12/22

## 2022-12-08 NOTE — PROGRESS NOTES
"Nutrition Services    Patient Name:  Mary Alice Sanders  YOB: 1935  MRN: [id number]  Admit Date:  11/30/2022    PROGRESS NOTE - CLINICAL NUTRITION    Comments: Follow up for TF     Encounter Information         Reason for Encounter TF f/u     Current Issues Pt remains confused, and nonverbal. He is in restraints. TF's at 20 (goal is 55), no residuals. Na is WNL. K and phos replacement protocol in place. RD recommends to continue to replace K and phos. Progress TFs as tolerated. Will continue to follow      Current Nutrition Orders & Evaluation of Intake       Oral Nutrition     Current PO Diet NPO Diet NPO Type: Strict NPO   Supplement n/a   PO Evaluation     % PO Intake     # of Days Evaluated     Factors Affecting Intake  altered mental status   --   Enteral Nutrition     Enteral Route NG    TF Delivery Method Continuous    Enteral Product Isosource HN    Modular     Propofol Rate/Kcal     TF Current Rate (mL) 20    TF Goal Rate (mL) 55    Current Water Flush (mL) 30ml q4 hours     Current TF Provision  576 kcal, 26 gm protein, 388 mL free water + 180 mL water flushes         Calories  36% needs met         Protein  41 % needs met         TF Fluid (mL) 568 ml          IV Fluids     Avg Volume Delivered (mL)     % Goal Volume     TF Residual  no or minimal residual    TF Changes none    TF Tolerance tolerating     Anthropometrics          Height    Weight Height: 165.1 cm (65\")  Weight: 60.5 kg (133 lb 4.8 oz) (12/08/22 0535)    BMI kg/m2 Body mass index is 22.18 kg/m².    Weight trend Unknown d/t varying bed scale weights     Labs        Pertinent Labs Reviewed, listed below     Results from last 7 days   Lab Units 12/08/22  0434 12/07/22  2331 12/07/22  0406 12/06/22  0358 12/05/22  0359 12/04/22  0400   SODIUM mmol/L 140  --  142 137   < > 140   POTASSIUM mmol/L 3.5 3.5 3.1* 3.5   < > 3.3*   CHLORIDE mmol/L 110*  --  112* 110*   < > 110*   CO2 mmol/L 21.9*  --  18.1* 19.0*   < > 20.3*   BUN mg/dL 8  " --  9 7*   < > 7*   CREATININE mg/dL 0.43*  --  0.40* 0.48*   < > 0.50*   CALCIUM mg/dL 8.0*  --  7.6* 7.6*   < > 7.1*   BILIRUBIN mg/dL  --   --   --   --   --  1.0   ALK PHOS U/L  --   --   --   --   --  94   ALT (SGPT) U/L  --   --   --   --   --  42*   AST (SGOT) U/L  --   --   --   --   --  55*   GLUCOSE mg/dL 104*  --  88 89   < > 96    < > = values in this interval not displayed.     Results from last 7 days   Lab Units 12/08/22  1027 12/08/22  0434 12/07/22  1300 12/07/22  0406 12/04/22  0400 12/03/22  0413   MAGNESIUM mg/dL  --   --   --  2.0  --  1.9   PHOSPHORUS mg/dL 2.2* 2.7   < > 2.0*   < > 1.7*   HEMOGLOBIN g/dL  --  10.5*  --  10.2*   < > 9.9*   HEMATOCRIT %  --  31.2*  --  29.9*   < > 29.3*   WBC 10*3/mm3  --  4.97  --  4.52   < > 4.97   ALBUMIN g/dL  --  2.50*  --  2.40*   < > 2.20*    < > = values in this interval not displayed.     Results from last 7 days   Lab Units 12/08/22  0434 12/07/22  0406 12/06/22  0358 12/05/22  0359 12/04/22  0400   PLATELETS 10*3/mm3 108* 93* 81* 66* 53*  53*     COVID19   Date Value Ref Range Status   11/30/2022 Detected (C) Not Detected - Ref. Range Final     Lab Results   Component Value Date    HGBA1C 5.20 03/05/2020          Medications            Scheduled Medications insulin regular, 0-7 Units, Subcutaneous, Q6H  potassium phosphate, 15 mmol, Intravenous, Once  sodium chloride, 10 mL, Intravenous, Q12H        Infusions sodium chloride, 50 mL/hr, Last Rate: 50 mL/hr (12/07/22 2008)        PRN Medications •  acetaminophen **OR** acetaminophen  •  dextrose  •  dextrose  •  glucagon (human recombinant)  •  ipratropium-albuterol  •  ondansetron **OR** ondansetron  •  potassium chloride  •  potassium phosphate infusion greater than 15 mMoles **OR** potassium phosphate **OR** potassium phosphate  •  sodium chloride  •  sodium chloride     Physical Findings          Physical Appearance confused, disoriented, Other: in restrains, nonverbal   Oral/Mouth Cavity teeth  missing   Edema  no edema   Gastrointestinal last bowel movement:   Skin  pressure injury DTI thoracic and sacral spine    Tubes/Drains NG tube   NFPE Date Completed: 12/1   --  NUTRITION INTERVENTION / PLAN OF CARE  Intervention Goal         Intervention Goal(s) Nutrition support treatment, Reduce/improve symptoms, Meet estimated needs, Disease management/therapy, Tolerate TF/PN at goal and Maintain weight     Nutrition Intervention         RD Action Follow Tx Progress and Care plan reviewed     Nutrition Prescription         Diet Prescription     Supplement Prescription n/a   EN/PN Prescription    New Prescription Ordered? Continue same per protocol   --   Enteral Prescription:     Enteral Route NG    TF Delivery Method Continuous    Enteral Product Isosource HN    Modular     Propofol Rate/Kcal     TF Start Rate (mL/hr) 10    TF Goal Rate (mL/hr) 55    Free Water Flush (mL) 30ml q4 hours     TF Provision at Goal: 1584 kcal, 72 gm protein, 1066 mL free water + 180 mL water flushes         Calories 100 % needs met         Protein  100 % needs met         Fluid (mL) 1066 mL total     Prescription Ordered Continue same per protocol     Monitor/Evaluation        Monitor Per protocol, Pertinent labs, EN delivery/tolerance, Weight, Skin status, GI status, Symptoms, POC/GOC, Swallow function   Discharge Needs Pending clinical course   Education Education not appropriate at this time   --    RD to follow up per protocol.    Electronically signed by:  Mariah Darnell RD  12/08/22 12:11 EST

## 2022-12-08 NOTE — PLAN OF CARE
Goal Outcome Evaluation:  Plan of Care Reviewed With: patient          Problem: Restraint, Nonbehavioral (Nonviolent)  Goal: Absence of Harm or Injury  Outcome: Ongoing, Not Progressing  Intervention: Implement Least Restrictive Safety Strategies  Recent Flowsheet Documentation  Taken 12/8/2022 0400 by Vianey Chung, RN  Medical Device Protection:   IV pole/bag removed from visual field   tubing secured   torso covered  Less Restrictive Alternative:   bed alarm in use   calming techniques promoted   safety enhancements provided   sensory stimulation limited   surveillance provided   sensory stimulation provided  De-Escalation Techniques:   appropriate behavior reinforced   diversional activity encouraged   increased round frequency   quiet time facilitated   stimulation decreased   verbally redirected  Diversional Activities: television  Taken 12/8/2022 0200 by Vianey Chung, RN  Medical Device Protection:   IV pole/bag removed from visual field   torso covered   tubing secured  Less Restrictive Alternative:   bed alarm in use   calming techniques promoted   safety enhancements provided   sensory stimulation limited   surveillance provided   sensory stimulation provided  De-Escalation Techniques:   appropriate behavior reinforced   increased round frequency   medication administered   reoriented   stimulation decreased   verbally redirected  Diversional Activities: television  Taken 12/8/2022 0000 by Vianey Chung, RN  Medical Device Protection:   IV pole/bag removed from visual field   torso covered   tubing secured  Less Restrictive Alternative:   bed alarm in use   calming techniques promoted   safety enhancements provided   sensory stimulation limited   surveillance provided   sensory stimulation provided  De-Escalation Techniques:   appropriate behavior reinforced   increased round frequency   medication administered   reoriented   stimulation decreased   verbally redirected  Diversional Activities:  television  Taken 12/7/2022 2200 by Vianey Chung RN  Medical Device Protection:   IV pole/bag removed from visual field   torso covered   tubing secured  Less Restrictive Alternative:   bed alarm in use   calming techniques promoted   safety enhancements provided   sensory stimulation limited   surveillance provided   sensory stimulation provided  De-Escalation Techniques:   appropriate behavior reinforced   diversional activity encouraged   increased round frequency   quiet time facilitated   stimulation decreased   verbally redirected  Diversional Activities: television  Taken 12/7/2022 2009 by Vianey Chung RN  Medical Device Protection:   IV pole/bag removed from visual field   torso covered   tubing secured  Less Restrictive Alternative:   bed alarm in use   calming techniques promoted   safety enhancements provided   sensory stimulation limited   surveillance provided   sensory stimulation provided  De-Escalation Techniques:   appropriate behavior reinforced   diversional activity encouraged   increased round frequency   quiet time facilitated   reoriented   stimulation decreased   verbally redirected  Diversional Activities: television  Intervention: Protect Dignity, Rights, and Personal Wellbeing  Recent Flowsheet Documentation  Taken 12/8/2022 0000 by Vianey Chung RN  Trust Relationship/Rapport: care explained  Taken 12/7/2022 2009 by Vianey Chung RN  Trust Relationship/Rapport: care explained  Intervention: Protect Skin and Joint Integrity  Recent Flowsheet Documentation  Taken 12/8/2022 0409 by Vianey Chung RN  Body Position:   turned   left   tilted   weight shifting   legs elevated  Taken 12/8/2022 0000 by Vianey Chung RN  Body Position: (Simultaneous filing. User may be unaware of other data.)   turned   right   side-lying   legs elevated  Taken 12/7/2022 2200 by Vianey Chung RN  Body Position:   weight shifting   supine, legs elevated   sitting up in bed  Taken 12/7/2022  2009 by Vianey Chung, RN  Body Position:   turned   left   side-lying   legs elevated

## 2022-12-08 NOTE — PROGRESS NOTES
"DAILY PROGRESS NOTE  [facility]    Patient Identification:  Name: Mary Alice Sanders  Age: 87 y.o.  Sex: female  :  1935  MRN: [id number]         Primary Care Physician: Gera Barker MD    Subjective:  Interval History: The patient is confused and not talking.  Objective:    Scheduled Meds:insulin regular, 0-7 Units, Subcutaneous, Q6H  potassium phosphate, 15 mmol, Intravenous, Once  sodium chloride, 10 mL, Intravenous, Q12H      Continuous Infusions:sodium chloride, 50 mL/hr, Last Rate: 50 mL/hr (22)        Vital signs in last 24 hours:  Temp:  [97.1 °F (36.2 °C)-99 °F (37.2 °C)] 97.2 °F (36.2 °C)  Heart Rate:  [] 86  Resp:  [14-18] 16  BP: (113-128)/(78-91) 122/78    Intake/Output:    Intake/Output Summary (Last 24 hours) at 2022 1511  Last data filed at 2022 0535  Gross per 24 hour   Intake --   Output 600 ml   Net -600 ml       Exam:  /78 (BP Location: Right arm, Patient Position: Lying)   Pulse 86   Temp 97.2 °F (36.2 °C) (Oral)   Resp 16   Ht 165.1 cm (65\")   Wt 60.5 kg (133 lb 4.8 oz)   SpO2 96%   BMI 22.18 kg/m²     General Appearance:   Confused, no distress   Head:    Normocephalic, without obvious abnormality, atraumatic   Eyes:       Throat:   Lips, tongue, gums normal   Neck:   Supple, symmetrical, trachea midline, no JVD   Lungs:     Clear to auscultation bilaterally, respirations unlabored   Chest Wall:    No tenderness or deformity    Heart:    Regular rate and rhythm, S1 and S2 normal, no murmur,no  Rub or gallop   Abdomen:     Soft, nontender, bowel sounds active, no masses, no organomegaly    Extremities:   Extremities normal, atraumatic, no cyanosis or edema   Pulses:      Skin:   Skin is warm and dry,  no rashes or palpable lesions   Neurologic:   no focal deficits noted, confused and demented.  Patient is not talking.      Lab Results (last 72 hours)     Procedure Component Value Units Date/Time    Magnesium [[id number]]  (Normal) " Collected: 12/07/22 0406    Specimen: Blood Updated: 12/07/22 1131     Magnesium 2.0 mg/dL     POC Glucose Once [[id number]]  (Normal) Collected: 12/07/22 1104    Specimen: Blood Updated: 12/07/22 1105     Glucose 118 mg/dL      Comment: Meter: [id number] : [id number] Charlotte Damian NA       POC Glucose Once [[id number]]  (Normal) Collected: 12/07/22 0633    Specimen: Blood Updated: 12/07/22 0634     Glucose 98 mg/dL      Comment: Meter: [id number] : [id number] Osorio PLATT       Renal Function Panel [[id number]]  (Abnormal) Collected: 12/07/22 0406    Specimen: Blood Updated: 12/07/22 0447     Glucose 88 mg/dL      BUN 9 mg/dL      Creatinine 0.40 mg/dL      Sodium 142 mmol/L      Potassium 3.1 mmol/L      Chloride 112 mmol/L      CO2 18.1 mmol/L      Calcium 7.6 mg/dL      Albumin 2.40 g/dL      Phosphorus 2.0 mg/dL      Anion Gap 11.9 mmol/L      BUN/Creatinine Ratio 22.5     eGFR 95.9 mL/min/1.73      Comment: National Kidney Foundation and American Society of Nephrology (ASN) Task Force recommended calculation based on the Chronic Kidney Disease Epidemiology Collaboration (CKD-EPI) equation refit without adjustment for race.       Narrative:      GFR Normal >60  Chronic Kidney Disease <60  Kidney Failure <15    The GFR formula is only valid for adults with stable renal function between ages 18 and 70.    CBC & Differential [[id number]]  (Abnormal) Collected: 12/07/22 0406    Specimen: Blood Updated: 12/07/22 0440    Narrative:      The following orders were created for panel order CBC & Differential.  Procedure                               Abnormality         Status                     ---------                               -----------         ------                     CBC Auto Differential[[id number]]        Abnormal            Final result                 Please view results for these tests on the individual orders.    CBC Auto Differential [[id number]]  (Abnormal) Collected: 12/07/22 0406     Specimen: Blood Updated: 12/07/22 0440     WBC 4.52 10*3/mm3      RBC 3.15 10*6/mm3      Hemoglobin 10.2 g/dL      Hematocrit 29.9 %      MCV 94.9 fL      MCH 32.4 pg      MCHC 34.1 g/dL      RDW 13.6 %      RDW-SD 45.7 fl      MPV 13.3 fL      Platelets 93 10*3/mm3      Neutrophil % 62.4 %      Lymphocyte % 23.7 %      Monocyte % 10.4 %      Eosinophil % 1.5 %      Basophil % 0.2 %      Neutrophils, Absolute 2.82 10*3/mm3      Lymphocytes, Absolute 1.07 10*3/mm3      Monocytes, Absolute 0.47 10*3/mm3      Eosinophils, Absolute 0.07 10*3/mm3      Basophils, Absolute 0.01 10*3/mm3     Phosphorus [[id number]]  (Abnormal) Collected: 12/06/22 2300    Specimen: Blood Updated: 12/06/22 2333     Phosphorus 2.2 mg/dL     POC Glucose Once [[id number]]  (Normal) Collected: 12/06/22 2319    Specimen: Blood Updated: 12/06/22 2320     Glucose 96 mg/dL      Comment: Meter: [id number] : [id number] Osorio Kaye NA       POC Glucose Once [[id number]]  (Normal) Collected: 12/06/22 2014    Specimen: Blood Updated: 12/06/22 2016     Glucose 96 mg/dL      Comment: Meter: [id number] : [id number] Osorio Kaye NA       POC Glucose Once [[id number]]  (Normal) Collected: 12/06/22 1611    Specimen: Blood Updated: 12/06/22 1612     Glucose 88 mg/dL      Comment: Meter: [id number] : [id number] Aaerike Rickie NA       DESHAWN + PE [[id number]]  (Abnormal) Collected: 12/04/22 0941    Specimen: Blood Updated: 12/06/22 1414     IgG 593 mg/dL      IgA 161 mg/dL      IgM 159 mg/dL      Total Protein 4.8 g/dL      Albumin 2.5 g/dL      Alpha-1-Globulin 0.3 g/dL      Alpha-2-Globulin 0.7 g/dL      Beta Globulin 0.7 g/dL      Gamma Globulin 0.5 g/dL      M-Wing Not Observed g/dL      Globulin 2.3 g/dL      A/G Ratio 1.1     Immunofixation Reflex, Serum Comment     Comment: No monoclonality detected.        Please note Comment     Comment: Protein electrophoresis scan will follow via computer, mail, or   delivery.       Narrative:       Performed at:  [address] [place] [address]  [id number]  : Payam Monteiro PhD, Phone:  [phone]    POC Glucose Once [[id number]]  (Normal) Collected: 12/06/22 1100    Specimen: Blood Updated: 12/06/22 1101     Glucose 102 mg/dL      Comment: Meter: [id number] : [id number] Charlotte PLATT       CBC & Differential [[id number]]  (Abnormal) Collected: 12/06/22 0358    Specimen: Blood Updated: 12/06/22 0654    Narrative:      The following orders were created for panel order CBC & Differential.  Procedure                               Abnormality         Status                     ---------                               -----------         ------                     CBC Auto Differential[[id number]]        Abnormal            Final result                 Please view results for these tests on the individual orders.    CBC Auto Differential [[id number]]  (Abnormal) Collected: 12/06/22 0358    Specimen: Blood Updated: 12/06/22 0654     WBC 6.21 10*3/mm3      RBC 3.43 10*6/mm3      Hemoglobin 11.1 g/dL      Hematocrit 31.0 %      MCV 90.4 fL      MCH 32.4 pg      MCHC 35.8 g/dL      RDW 13.0 %      RDW-SD 42.2 fl      MPV 14.3 fL      Platelets 81 10*3/mm3      Neutrophil % 70.1 %      Lymphocyte % 18.4 %      Monocyte % 8.9 %      Eosinophil % 0.8 %      Basophil % 0.2 %      Neutrophils, Absolute 4.36 10*3/mm3      Lymphocytes, Absolute 1.14 10*3/mm3      Monocytes, Absolute 0.55 10*3/mm3      Eosinophils, Absolute 0.05 10*3/mm3      Basophils, Absolute 0.01 10*3/mm3     Renal Function Panel [[id number]]  (Abnormal) Collected: 12/06/22 0358    Specimen: Blood Updated: 12/06/22 0600     Glucose 89 mg/dL      BUN 7 mg/dL      Creatinine 0.48 mg/dL      Sodium 137 mmol/L      Potassium 3.5 mmol/L      Chloride 110 mmol/L      CO2 19.0 mmol/L      Calcium 7.6 mg/dL      Albumin 2.60 g/dL      Phosphorus 1.8 mg/dL      Anion Gap 8.0 mmol/L      BUN/Creatinine Ratio 14.6     eGFR 91.8  mL/min/1.73      Comment: National Kidney Foundation and American Society of Nephrology (ASN) Task Force recommended calculation based on the Chronic Kidney Disease Epidemiology Collaboration (CKD-EPI) equation refit without adjustment for race.       Narrative:      GFR Normal >60  Chronic Kidney Disease <60  Kidney Failure <15    The GFR formula is only valid for adults with stable renal function between ages 18 and 70.    POC Glucose Once [[id number]]  (Normal) Collected: 12/06/22 0533    Specimen: Blood Updated: 12/06/22 0534     Glucose 95 mg/dL      Comment: Meter: [id number] : [id number] Osorio Kaye NA       POC Glucose Once [[id number]]  (Normal) Collected: 12/06/22 0008    Specimen: Blood Updated: 12/06/22 0009     Glucose 113 mg/dL      Comment: Meter: [id number] : [id number] Osorio Kaye NA       POC Glucose Once [[id number]]  (Normal) Collected: 12/05/22 2006    Specimen: Blood Updated: 12/05/22 2007     Glucose 101 mg/dL      Comment: Meter: [id number] : [id number] Osorio Kaye NA       POC Glucose Once [[id number]]  (Normal) Collected: 12/05/22 1607    Specimen: Blood Updated: 12/05/22 1609     Glucose 100 mg/dL      Comment: Meter: [id number] : [id number] Pharos Innovationsey PCA       Blood Culture - Blood, Arm, Left [[id number]]  (Normal) Collected: 11/30/22 1237    Specimen: Blood from Arm, Left Updated: 12/05/22 1315     Blood Culture No growth at 5 days    Blood Culture - Blood, Arm, Right [[id number]]  (Normal) Collected: 11/30/22 1140    Specimen: Blood from Arm, Right Updated: 12/05/22 1200     Blood Culture No growth at 5 days    POC Glucose Once [[id number]]  (Normal) Collected: 12/05/22 1115    Specimen: Blood Updated: 12/05/22 1117     Glucose 125 mg/dL      Comment: Meter: [id number] : [id number] Pharos Innovationsey PCA       POC Glucose Once [[id number]]  (Normal) Collected: 12/05/22 0706    Specimen: Blood Updated: 12/05/22 0707     Glucose 114 mg/dL      Comment: Meter:  [id number] : [id number] Finesse PLATT       Renal Function Panel [[id number]]  (Abnormal) Collected: 12/05/22 0359    Specimen: Blood Updated: 12/05/22 0458     Glucose 99 mg/dL      BUN 5 mg/dL      Creatinine 0.52 mg/dL      Sodium 137 mmol/L      Potassium 2.8 mmol/L      Chloride 105 mmol/L      CO2 20.9 mmol/L      Calcium 7.4 mg/dL      Albumin 2.70 g/dL      Phosphorus 1.8 mg/dL      Anion Gap 11.1 mmol/L      BUN/Creatinine Ratio 9.6     eGFR 90.1 mL/min/1.73      Comment: National Kidney Foundation and American Society of Nephrology (ASN) Task Force recommended calculation based on the Chronic Kidney Disease Epidemiology Collaboration (CKD-EPI) equation refit without adjustment for race.       Narrative:      GFR Normal >60  Chronic Kidney Disease <60  Kidney Failure <15    The GFR formula is only valid for adults with stable renal function between ages 18 and 70.    CBC & Differential [[id number]]  (Abnormal) Collected: 12/05/22 0359    Specimen: Blood Updated: 12/05/22 0450    Narrative:      The following orders were created for panel order CBC & Differential.  Procedure                               Abnormality         Status                     ---------                               -----------         ------                     CBC Auto Differential[[id number]]        Abnormal            Final result                 Please view results for these tests on the individual orders.    CBC Auto Differential [[id number]]  (Abnormal) Collected: 12/05/22 0359    Specimen: Blood Updated: 12/05/22 0450     WBC 6.22 10*3/mm3      RBC 3.61 10*6/mm3      Hemoglobin 11.6 g/dL      Hematocrit 34.1 %      MCV 94.5 fL      MCH 32.1 pg      MCHC 34.0 g/dL      RDW 13.1 %      RDW-SD 44.6 fl      MPV 14.3 fL      Platelets 66 10*3/mm3      Neutrophil % 68.2 %      Lymphocyte % 20.1 %      Monocyte % 8.5 %      Eosinophil % 1.1 %      Basophil % 0.3 %      Neutrophils, Absolute 4.24 10*3/mm3      Lymphocytes, Absolute  1.25 10*3/mm3      Monocytes, Absolute 0.53 10*3/mm3      Eosinophils, Absolute 0.07 10*3/mm3      Basophils, Absolute 0.02 10*3/mm3     POC Glucose Once [[id number]]  (Normal) Collected: 12/04/22 2119    Specimen: Blood Updated: 12/04/22 2120     Glucose 108 mg/dL      Comment: Meter: [id number] : [id number] Finesse PLATT       POC Glucose Once [[id number]]  (Normal) Collected: 12/04/22 1614    Specimen: Blood Updated: 12/04/22 1617     Glucose 122 mg/dL      Comment: Meter: [id number] : [id number] Lloyd Meraz CNA           Data Review:  Results from last 7 days   Lab Units 12/08/22  0434 12/07/22  2331 12/07/22  0406 12/06/22  0358   SODIUM mmol/L 140  --  142 137   POTASSIUM mmol/L 3.5 3.5 3.1* 3.5   CHLORIDE mmol/L 110*  --  112* 110*   CO2 mmol/L 21.9*  --  18.1* 19.0*   BUN mg/dL 8  --  9 7*   CREATININE mg/dL 0.43*  --  0.40* 0.48*   GLUCOSE mg/dL 104*  --  88 89   CALCIUM mg/dL 8.0*  --  7.6* 7.6*     Results from last 7 days   Lab Units 12/08/22  0434 12/07/22  0406 12/06/22  0358   WBC 10*3/mm3 4.97 4.52 6.21   HEMOGLOBIN g/dL 10.5* 10.2* 11.1*   HEMATOCRIT % 31.2* 29.9* 31.0*   PLATELETS 10*3/mm3 108* 93* 81*             Lab Results   Lab Value Date/Time    TROPONINT 0.028 11/30/2022 1140         Results from last 7 days   Lab Units 12/04/22  0400   ALK PHOS U/L 94   BILIRUBIN mg/dL 1.0   ALT (SGPT) U/L 42*   AST (SGOT) U/L 55*             Glucose   Date/Time Value Ref Range Status   12/08/2022 1058 115 70 - 130 mg/dL Final     Comment:     Meter: [id number] : [id number] Mauro Fountain CNA   12/08/2022 0637 117 70 - 130 mg/dL Final     Comment:     Meter: [id number] : [id number] Ronna PLATT   12/08/2022 0020 112 70 - 130 mg/dL Final     Comment:     Meter: [id number] : [id number] Ronna PLATT   12/07/2022 1551 106 70 - 130 mg/dL Final     Comment:     Meter: [id number] : [id number] Charlotte Damian    12/07/2022 1104 118 70 - 130 mg/dL Final     Comment:     Meter: [id number]  : [id number] Charlotte Damian NA   12/07/2022 0633 98 70 - 130 mg/dL Final     Comment:     Meter: [id number] : [id number] Osorio Restrepo NA   12/06/2022 2319 96 70 - 130 mg/dL Final     Comment:     Meter: [id number] : [id number] Osorio Restrepo NA   12/06/2022 2014 96 70 - 130 mg/dL Final     Comment:     Meter: [id number] : [id number] Osorio PLATT           Past Medical History:   Diagnosis Date   • Allergic rhinitis    • Chest pain    • GERD (gastroesophageal reflux disease)    • Hx of migraine headaches    • Hx: UTI (urinary tract infection)    • Hypertension    • Varicose veins        Assessment:  Active Hospital Problems    Diagnosis  POA   • **Hypernatremia [E87.0]  Yes   • Altered mental status [R41.82]  Unknown   • DUTCH (acute kidney injury) (HCC) [N17.9]  Unknown   • Severe malnutrition (HCC) [E43]  Yes   • DNR (do not resuscitate) [Z66]  Yes   • Malnutrition (HCC) [E46]  Yes   • Age-related physical debility [R54]  Yes   • Alzheimer's disease (HCC) [G30.9, F02.80]  Yes   • Hypercholesterolemia [E78.00]  Yes   • BP (high blood pressure) [I10]  Yes   • OP (osteoporosis) [M81.0]  Yes      Resolved Hospital Problems   No resolved problems to display.       Plan:  [place] appears to be poor.  Family wants to continue with tube feedings and see how she does.  We will continue and follow-up lab.  Family reportedly not ready to go for palliative care yet.  [place] remains poor.  If no improvement in a day or so we need to revisit palliative care with the family.  Discussed with nurse and case management.  Morgan Mcrae MD  12/8/2022  15:11 EST

## 2022-12-09 LAB
ALBUMIN SERPL-MCNC: 2.5 G/DL (ref 3.5–5.2)
ANION GAP SERPL CALCULATED.3IONS-SCNC: 8.7 MMOL/L (ref 5–15)
BASOPHILS # BLD AUTO: 0.01 10*3/MM3 (ref 0–0.2)
BASOPHILS NFR BLD AUTO: 0.2 % (ref 0–1.5)
BUN SERPL-MCNC: 8 MG/DL (ref 8–23)
BUN/CREAT SERPL: 25.8 (ref 7–25)
CALCIUM SPEC-SCNC: 7.9 MG/DL (ref 8.6–10.5)
CHLORIDE SERPL-SCNC: 107 MMOL/L (ref 98–107)
CO2 SERPL-SCNC: 22.3 MMOL/L (ref 22–29)
CREAT SERPL-MCNC: 0.31 MG/DL (ref 0.57–1)
DEPRECATED RDW RBC AUTO: 45.3 FL (ref 37–54)
EGFRCR SERPLBLD CKD-EPI 2021: 102 ML/MIN/1.73
EOSINOPHIL # BLD AUTO: 0.09 10*3/MM3 (ref 0–0.4)
EOSINOPHIL NFR BLD AUTO: 1.7 % (ref 0.3–6.2)
ERYTHROCYTE [DISTWIDTH] IN BLOOD BY AUTOMATED COUNT: 13.7 % (ref 12.3–15.4)
GLUCOSE BLDC GLUCOMTR-MCNC: 112 MG/DL (ref 70–130)
GLUCOSE BLDC GLUCOMTR-MCNC: 116 MG/DL (ref 70–130)
GLUCOSE BLDC GLUCOMTR-MCNC: 121 MG/DL (ref 70–130)
GLUCOSE BLDC GLUCOMTR-MCNC: 125 MG/DL (ref 70–130)
GLUCOSE BLDC GLUCOMTR-MCNC: 131 MG/DL (ref 70–130)
GLUCOSE SERPL-MCNC: 108 MG/DL (ref 65–99)
HCT VFR BLD AUTO: 30.8 % (ref 34–46.6)
HGB BLD-MCNC: 10.7 G/DL (ref 12–15.9)
IMM GRANULOCYTES # BLD AUTO: 0.07 10*3/MM3 (ref 0–0.05)
IMM GRANULOCYTES NFR BLD AUTO: 1.3 % (ref 0–0.5)
LYMPHOCYTES # BLD AUTO: 1.27 10*3/MM3 (ref 0.7–3.1)
LYMPHOCYTES NFR BLD AUTO: 24.1 % (ref 19.6–45.3)
MCH RBC QN AUTO: 32.7 PG (ref 26.6–33)
MCHC RBC AUTO-ENTMCNC: 34.7 G/DL (ref 31.5–35.7)
MCV RBC AUTO: 94.2 FL (ref 79–97)
MONOCYTES # BLD AUTO: 0.57 10*3/MM3 (ref 0.1–0.9)
MONOCYTES NFR BLD AUTO: 10.8 % (ref 5–12)
NEUTROPHILS NFR BLD AUTO: 3.27 10*3/MM3 (ref 1.7–7)
NEUTROPHILS NFR BLD AUTO: 61.9 % (ref 42.7–76)
NRBC BLD AUTO-RTO: 0 /100 WBC (ref 0–0.2)
PHOSPHATE SERPL-MCNC: 2.2 MG/DL (ref 2.5–4.5)
PLATELET # BLD AUTO: 126 10*3/MM3 (ref 140–450)
PMV BLD AUTO: 12.1 FL (ref 6–12)
POTASSIUM SERPL-SCNC: 3.2 MMOL/L (ref 3.5–5.2)
POTASSIUM SERPL-SCNC: 4.1 MMOL/L (ref 3.5–5.2)
RBC # BLD AUTO: 3.27 10*6/MM3 (ref 3.77–5.28)
SODIUM SERPL-SCNC: 138 MMOL/L (ref 136–145)
WBC NRBC COR # BLD: 5.28 10*3/MM3 (ref 3.4–10.8)

## 2022-12-09 PROCEDURE — 0 POTASSIUM CHLORIDE 10 MEQ/100ML SOLUTION: Performed by: HOSPITALIST

## 2022-12-09 PROCEDURE — 82962 GLUCOSE BLOOD TEST: CPT

## 2022-12-09 PROCEDURE — 80069 RENAL FUNCTION PANEL: CPT | Performed by: INTERNAL MEDICINE

## 2022-12-09 PROCEDURE — 85025 COMPLETE CBC W/AUTO DIFF WBC: CPT | Performed by: INTERNAL MEDICINE

## 2022-12-09 PROCEDURE — 84132 ASSAY OF SERUM POTASSIUM: CPT | Performed by: HOSPITALIST

## 2022-12-09 RX ADMIN — SODIUM CHLORIDE 50 ML/HR: 9 INJECTION, SOLUTION INTRAVENOUS at 18:39

## 2022-12-09 RX ADMIN — POTASSIUM CHLORIDE 10 MEQ: 7.46 INJECTION, SOLUTION INTRAVENOUS at 18:39

## 2022-12-09 RX ADMIN — Medication 10 ML: at 09:36

## 2022-12-09 RX ADMIN — POTASSIUM CHLORIDE 10 MEQ: 7.46 INJECTION, SOLUTION INTRAVENOUS at 12:27

## 2022-12-09 RX ADMIN — POTASSIUM CHLORIDE 10 MEQ: 7.46 INJECTION, SOLUTION INTRAVENOUS at 16:59

## 2022-12-09 RX ADMIN — POTASSIUM CHLORIDE 10 MEQ: 7.46 INJECTION, SOLUTION INTRAVENOUS at 13:57

## 2022-12-09 RX ADMIN — Medication 10 ML: at 20:04

## 2022-12-09 RX ADMIN — POTASSIUM PHOSPHATE, MONOBASIC AND POTASSIUM PHOSPHATE, DIBASIC 9 MMOL: 224; 236 INJECTION, SOLUTION, CONCENTRATE INTRAVENOUS at 10:50

## 2022-12-09 NOTE — PLAN OF CARE
Problem: Restraint, Nonbehavioral (Nonviolent)  Goal: Absence of Harm or Injury  Outcome: Ongoing, Progressing  Intervention: Implement Least Restrictive Safety Strategies  Flowsheets  Taken 12/9/2022 0400  Medical Device Protection:   IV pole/bag removed from visual field   torso covered  Less Restrictive Alternative:   bed alarm in use   appropriate expression promoted   environment adjusted   sensory stimulation limited  De-Escalation Techniques:   quiet time facilitated   reoriented   stimulation decreased  Diversional Activities: television  Taken 12/9/2022 0200  Medical Device Protection:   IV pole/bag removed from visual field   tubing secured  Less Restrictive Alternative:   bed alarm in use   appropriate expression promoted   environment adjusted   sensory stimulation limited  De-Escalation Techniques:   stimulation decreased   reoriented   quiet time facilitated  Diversional Activities: television  Taken 12/9/2022 0000  Medical Device Protection:   IV pole/bag removed from visual field   tubing secured   torso covered  Less Restrictive Alternative:   bed alarm in use   appropriate expression promoted   environment adjusted   sensory stimulation limited  De-Escalation Techniques:   quiet time facilitated   reoriented   stimulation decreased  Diversional Activities: television  Taken 12/8/2022 2200  Medical Device Protection: IV pole/bag removed from visual field  Less Restrictive Alternative:   bed alarm in use   appropriate expression promoted   environment adjusted   sensory stimulation limited  De-Escalation Techniques:   quiet time facilitated   reoriented   stimulation decreased  Diversional Activities: television  Taken 12/8/2022 2000  Medical Device Protection:   IV pole/bag removed from visual field   torso covered   tubing secured  Less Restrictive Alternative:   bed alarm in use   appropriate expression promoted   environment adjusted   sensory stimulation limited  De-Escalation Techniques:  diversional activity encouraged  Diversional Activities: television  Intervention: Protect Skin and Joint Integrity  Recent Flowsheet Documentation  Taken 12/9/2022 0200 by Desiree Mcclellan RN  Body Position:   right   turned  Taken 12/9/2022 0000 by Desiree Mcclellan RN  Body Position:   left   turned  Taken 12/8/2022 2200 by Desiree Mcclellan RN  Body Position:   supine   turned  Taken 12/8/2022 2048 by Desiree Mcclellan RN  Body Position:   right   turned   Goal Outcome Evaluation:           Progress: no change  Outcome Evaluation: Pt has been nonverbal and does not follow commands. Cortrack and tube feeds continued. Remains in restraints, continues to try to pull at [place] without restraints. Oral care attempted, patient did not open mouth and was clenching down. Dressing changed. Incontinence care. IVF. Q2 turns. No acute distress noted. VSS

## 2022-12-09 NOTE — PROGRESS NOTES
"DAILY PROGRESS NOTE  [facility]    Patient Identification:  Name: Mary Alice Sanders  Age: 87 y.o.  Sex: female  :  1935  MRN: [id number]         Primary Care Physician: Gera Barker MD    Subjective:  Interval History: The patient is confused and not talking.  Objective:    Scheduled Meds:insulin regular, 0-7 Units, Subcutaneous, Q6H  potassium phosphate, 15 mmol, Intravenous, Once  sodium chloride, 10 mL, Intravenous, Q12H      Continuous Infusions:sodium chloride, 50 mL/hr, Last Rate: 50 mL/hr (22 1744)        Vital signs in last 24 hours:  Temp:  [97.3 °F (36.3 °C)-98.2 °F (36.8 °C)] 97.6 °F (36.4 °C)  Heart Rate:  [] 103  Resp:  [15-16] 16  BP: (102-134)/(61-93) 134/86    Intake/Output:    Intake/Output Summary (Last 24 hours) at 2022 1505  Last data filed at 2022 0407  Gross per 24 hour   Intake 1328 ml   Output 625 ml   Net 703 ml       Exam:  /86 (BP Location: Left arm, Patient Position: Lying)   Pulse 103   Temp 97.6 °F (36.4 °C) (Oral)   Resp 16   Ht 165.1 cm (65\")   Wt 60.3 kg (133 lb)   SpO2 98%   BMI 22.13 kg/m²     General Appearance:   Confused, no distress   Head:    Normocephalic, without obvious abnormality, atraumatic   Eyes:       Throat:   Lips, tongue, gums normal   Neck:   Supple, symmetrical, trachea midline, no JVD   Lungs:     Clear to auscultation bilaterally, respirations unlabored   Chest Wall:    No tenderness or deformity    Heart:    Regular rate and rhythm, S1 and S2 normal, no murmur,no  Rub or gallop   Abdomen:     Soft, nontender, bowel sounds active, no masses, no organomegaly    Extremities:   Extremities normal, atraumatic, no cyanosis or edema   Pulses:      Skin:   Skin is warm and dry,  no rashes or palpable lesions   Neurologic:   no focal deficits noted, confused and demented.  Patient is not talking.      Lab Results (last 72 hours)     Procedure Component Value Units Date/Time    Magnesium [[id number]]  (Normal) " Collected: 12/07/22 0406    Specimen: Blood Updated: 12/07/22 1131     Magnesium 2.0 mg/dL     POC Glucose Once [[id number]]  (Normal) Collected: 12/07/22 1104    Specimen: Blood Updated: 12/07/22 1105     Glucose 118 mg/dL      Comment: Meter: [id number] : [id number] Charlotte Damian NA       POC Glucose Once [[id number]]  (Normal) Collected: 12/07/22 0633    Specimen: Blood Updated: 12/07/22 0634     Glucose 98 mg/dL      Comment: Meter: [id number] : [id number] Osorio PLATT       Renal Function Panel [[id number]]  (Abnormal) Collected: 12/07/22 0406    Specimen: Blood Updated: 12/07/22 0447     Glucose 88 mg/dL      BUN 9 mg/dL      Creatinine 0.40 mg/dL      Sodium 142 mmol/L      Potassium 3.1 mmol/L      Chloride 112 mmol/L      CO2 18.1 mmol/L      Calcium 7.6 mg/dL      Albumin 2.40 g/dL      Phosphorus 2.0 mg/dL      Anion Gap 11.9 mmol/L      BUN/Creatinine Ratio 22.5     eGFR 95.9 mL/min/1.73      Comment: National Kidney Foundation and American Society of Nephrology (ASN) Task Force recommended calculation based on the Chronic Kidney Disease Epidemiology Collaboration (CKD-EPI) equation refit without adjustment for race.       Narrative:      GFR Normal >60  Chronic Kidney Disease <60  Kidney Failure <15    The GFR formula is only valid for adults with stable renal function between ages 18 and 70.    CBC & Differential [[id number]]  (Abnormal) Collected: 12/07/22 0406    Specimen: Blood Updated: 12/07/22 0440    Narrative:      The following orders were created for panel order CBC & Differential.  Procedure                               Abnormality         Status                     ---------                               -----------         ------                     CBC Auto Differential[[id number]]        Abnormal            Final result                 Please view results for these tests on the individual orders.    CBC Auto Differential [[id number]]  (Abnormal) Collected: 12/07/22 0406     Specimen: Blood Updated: 12/07/22 0440     WBC 4.52 10*3/mm3      RBC 3.15 10*6/mm3      Hemoglobin 10.2 g/dL      Hematocrit 29.9 %      MCV 94.9 fL      MCH 32.4 pg      MCHC 34.1 g/dL      RDW 13.6 %      RDW-SD 45.7 fl      MPV 13.3 fL      Platelets 93 10*3/mm3      Neutrophil % 62.4 %      Lymphocyte % 23.7 %      Monocyte % 10.4 %      Eosinophil % 1.5 %      Basophil % 0.2 %      Neutrophils, Absolute 2.82 10*3/mm3      Lymphocytes, Absolute 1.07 10*3/mm3      Monocytes, Absolute 0.47 10*3/mm3      Eosinophils, Absolute 0.07 10*3/mm3      Basophils, Absolute 0.01 10*3/mm3     Phosphorus [[id number]]  (Abnormal) Collected: 12/06/22 2300    Specimen: Blood Updated: 12/06/22 2333     Phosphorus 2.2 mg/dL     POC Glucose Once [[id number]]  (Normal) Collected: 12/06/22 2319    Specimen: Blood Updated: 12/06/22 2320     Glucose 96 mg/dL      Comment: Meter: [id number] : [id number] Osorio Kaye NA       POC Glucose Once [[id number]]  (Normal) Collected: 12/06/22 2014    Specimen: Blood Updated: 12/06/22 2016     Glucose 96 mg/dL      Comment: Meter: [id number] : [id number] Osorio Kaye NA       POC Glucose Once [[id number]]  (Normal) Collected: 12/06/22 1611    Specimen: Blood Updated: 12/06/22 1612     Glucose 88 mg/dL      Comment: Meter: [id number] : [id number] Aaerike Rickie NA       DESHAWN + PE [[id number]]  (Abnormal) Collected: 12/04/22 0941    Specimen: Blood Updated: 12/06/22 1414     IgG 593 mg/dL      IgA 161 mg/dL      IgM 159 mg/dL      Total Protein 4.8 g/dL      Albumin 2.5 g/dL      Alpha-1-Globulin 0.3 g/dL      Alpha-2-Globulin 0.7 g/dL      Beta Globulin 0.7 g/dL      Gamma Globulin 0.5 g/dL      M-Wing Not Observed g/dL      Globulin 2.3 g/dL      A/G Ratio 1.1     Immunofixation Reflex, Serum Comment     Comment: No monoclonality detected.        Please note Comment     Comment: Protein electrophoresis scan will follow via computer, mail, or   delivery.       Narrative:       Performed at:  [address] [place] [address]  [id number]  : Payam Monteiro PhD, Phone:  [phone]    POC Glucose Once [[id number]]  (Normal) Collected: 12/06/22 1100    Specimen: Blood Updated: 12/06/22 1101     Glucose 102 mg/dL      Comment: Meter: [id number] : [id number] Charlotte PLATT       CBC & Differential [[id number]]  (Abnormal) Collected: 12/06/22 0358    Specimen: Blood Updated: 12/06/22 0654    Narrative:      The following orders were created for panel order CBC & Differential.  Procedure                               Abnormality         Status                     ---------                               -----------         ------                     CBC Auto Differential[[id number]]        Abnormal            Final result                 Please view results for these tests on the individual orders.    CBC Auto Differential [[id number]]  (Abnormal) Collected: 12/06/22 0358    Specimen: Blood Updated: 12/06/22 0654     WBC 6.21 10*3/mm3      RBC 3.43 10*6/mm3      Hemoglobin 11.1 g/dL      Hematocrit 31.0 %      MCV 90.4 fL      MCH 32.4 pg      MCHC 35.8 g/dL      RDW 13.0 %      RDW-SD 42.2 fl      MPV 14.3 fL      Platelets 81 10*3/mm3      Neutrophil % 70.1 %      Lymphocyte % 18.4 %      Monocyte % 8.9 %      Eosinophil % 0.8 %      Basophil % 0.2 %      Neutrophils, Absolute 4.36 10*3/mm3      Lymphocytes, Absolute 1.14 10*3/mm3      Monocytes, Absolute 0.55 10*3/mm3      Eosinophils, Absolute 0.05 10*3/mm3      Basophils, Absolute 0.01 10*3/mm3     Renal Function Panel [[id number]]  (Abnormal) Collected: 12/06/22 0358    Specimen: Blood Updated: 12/06/22 0600     Glucose 89 mg/dL      BUN 7 mg/dL      Creatinine 0.48 mg/dL      Sodium 137 mmol/L      Potassium 3.5 mmol/L      Chloride 110 mmol/L      CO2 19.0 mmol/L      Calcium 7.6 mg/dL      Albumin 2.60 g/dL      Phosphorus 1.8 mg/dL      Anion Gap 8.0 mmol/L      BUN/Creatinine Ratio 14.6     eGFR 91.8  mL/min/1.73      Comment: National Kidney Foundation and American Society of Nephrology (ASN) Task Force recommended calculation based on the Chronic Kidney Disease Epidemiology Collaboration (CKD-EPI) equation refit without adjustment for race.       Narrative:      GFR Normal >60  Chronic Kidney Disease <60  Kidney Failure <15    The GFR formula is only valid for adults with stable renal function between ages 18 and 70.    POC Glucose Once [[id number]]  (Normal) Collected: 12/06/22 0533    Specimen: Blood Updated: 12/06/22 0534     Glucose 95 mg/dL      Comment: Meter: [id number] : [id number] Osorio Kaye NA       POC Glucose Once [[id number]]  (Normal) Collected: 12/06/22 0008    Specimen: Blood Updated: 12/06/22 0009     Glucose 113 mg/dL      Comment: Meter: [id number] : [id number] Osorio Kaye NA       POC Glucose Once [[id number]]  (Normal) Collected: 12/05/22 2006    Specimen: Blood Updated: 12/05/22 2007     Glucose 101 mg/dL      Comment: Meter: [id number] : [id number] Osorio Kaye NA       POC Glucose Once [[id number]]  (Normal) Collected: 12/05/22 1607    Specimen: Blood Updated: 12/05/22 1609     Glucose 100 mg/dL      Comment: Meter: [id number] : [id number] [facility]       Blood Culture - Blood, Arm, Left [[id number]]  (Normal) Collected: 11/30/22 1237    Specimen: Blood from Arm, Left Updated: 12/05/22 1315     Blood Culture No growth at 5 days    Blood Culture - Blood, Arm, Right [[id number]]  (Normal) Collected: 11/30/22 1140    Specimen: Blood from Arm, Right Updated: 12/05/22 1200     Blood Culture No growth at 5 days    POC Glucose Once [[id number]]  (Normal) Collected: 12/05/22 1115    Specimen: Blood Updated: 12/05/22 1117     Glucose 125 mg/dL      Comment: Meter: [id number] : [id number] [facility]       POC Glucose Once [[id number]]  (Normal) Collected: 12/05/22 0706    Specimen: Blood Updated: 12/05/22 0707     Glucose 114 mg/dL      Comment: Meter:  [id number] : [id number] Finesse PLATT       Renal Function Panel [[id number]]  (Abnormal) Collected: 12/05/22 0359    Specimen: Blood Updated: 12/05/22 0458     Glucose 99 mg/dL      BUN 5 mg/dL      Creatinine 0.52 mg/dL      Sodium 137 mmol/L      Potassium 2.8 mmol/L      Chloride 105 mmol/L      CO2 20.9 mmol/L      Calcium 7.4 mg/dL      Albumin 2.70 g/dL      Phosphorus 1.8 mg/dL      Anion Gap 11.1 mmol/L      BUN/Creatinine Ratio 9.6     eGFR 90.1 mL/min/1.73      Comment: National Kidney Foundation and American Society of Nephrology (ASN) Task Force recommended calculation based on the Chronic Kidney Disease Epidemiology Collaboration (CKD-EPI) equation refit without adjustment for race.       Narrative:      GFR Normal >60  Chronic Kidney Disease <60  Kidney Failure <15    The GFR formula is only valid for adults with stable renal function between ages 18 and 70.    CBC & Differential [[id number]]  (Abnormal) Collected: 12/05/22 0359    Specimen: Blood Updated: 12/05/22 0450    Narrative:      The following orders were created for panel order CBC & Differential.  Procedure                               Abnormality         Status                     ---------                               -----------         ------                     CBC Auto Differential[[id number]]        Abnormal            Final result                 Please view results for these tests on the individual orders.    CBC Auto Differential [[id number]]  (Abnormal) Collected: 12/05/22 0359    Specimen: Blood Updated: 12/05/22 0450     WBC 6.22 10*3/mm3      RBC 3.61 10*6/mm3      Hemoglobin 11.6 g/dL      Hematocrit 34.1 %      MCV 94.5 fL      MCH 32.1 pg      MCHC 34.0 g/dL      RDW 13.1 %      RDW-SD 44.6 fl      MPV 14.3 fL      Platelets 66 10*3/mm3      Neutrophil % 68.2 %      Lymphocyte % 20.1 %      Monocyte % 8.5 %      Eosinophil % 1.1 %      Basophil % 0.3 %      Neutrophils, Absolute 4.24 10*3/mm3      Lymphocytes, Absolute  1.25 10*3/mm3      Monocytes, Absolute 0.53 10*3/mm3      Eosinophils, Absolute 0.07 10*3/mm3      Basophils, Absolute 0.02 10*3/mm3     POC Glucose Once [[id number]]  (Normal) Collected: 12/04/22 2119    Specimen: Blood Updated: 12/04/22 2120     Glucose 108 mg/dL      Comment: Meter: [id number] : [id number] Finesse PLATT       POC Glucose Once [[id number]]  (Normal) Collected: 12/04/22 1614    Specimen: Blood Updated: 12/04/22 1617     Glucose 122 mg/dL      Comment: Meter: [id number] : [id number] Lloyd Meraz CNA           Data Review:  Results from last 7 days   Lab Units 12/09/22  0401 12/08/22  0434 12/07/22  2331 12/07/22  0406   SODIUM mmol/L 138 140  --  142   POTASSIUM mmol/L 3.2* 3.5 3.5 3.1*   CHLORIDE mmol/L 107 110*  --  112*   CO2 mmol/L 22.3 21.9*  --  18.1*   BUN mg/dL 8 8  --  9   CREATININE mg/dL 0.31* 0.43*  --  0.40*   GLUCOSE mg/dL 108* 104*  --  88   CALCIUM mg/dL 7.9* 8.0*  --  7.6*     Results from last 7 days   Lab Units 12/09/22  0401 12/08/22  0434 12/07/22  0406   WBC 10*3/mm3 5.28 4.97 4.52   HEMOGLOBIN g/dL 10.7* 10.5* 10.2*   HEMATOCRIT % 30.8* 31.2* 29.9*   PLATELETS 10*3/mm3 126* 108* 93*             Lab Results   Lab Value Date/Time    TROPONINT 0.028 11/30/2022 1140         Results from last 7 days   Lab Units 12/04/22  0400   ALK PHOS U/L 94   BILIRUBIN mg/dL 1.0   ALT (SGPT) U/L 42*   AST (SGOT) U/L 55*             Glucose   Date/Time Value Ref Range Status   12/09/2022 1121 112 70 - 130 mg/dL Final     Comment:     Meter: [id number] : [id number] Charlotte Damian NA   12/09/2022 0626 121 70 - 130 mg/dL Final     Comment:     Meter: [id number] : [id number] Osorio Kaye NA   12/09/2022 0010 116 70 - 130 mg/dL Final     Comment:     Meter: [id number] : [id number] Osorio Kaye NA   12/08/2022 2019 108 70 - 130 mg/dL Final     Comment:     Meter: [id number] : [id number] Osorio Kaye    12/08/2022 1827 104 70 - 130 mg/dL Final     Comment:     Meter:  [id number] : [id number] Horace Stewart NA   12/08/2022 1058 115 70 - 130 mg/dL Final     Comment:     Meter: [id number] : [id number] Mauro DE LA ROSAA   12/08/2022 0637 117 70 - 130 mg/dL Final     Comment:     Meter: [id number] : [id number] Ronna Rosenberg NA   12/08/2022 0020 112 70 - 130 mg/dL Final     Comment:     Meter: [id number] : [id number] Ronna Rosenberg NA           Past Medical History:   Diagnosis Date   • Allergic rhinitis    • Chest pain    • GERD (gastroesophageal reflux disease)    • Hx of migraine headaches    • Hx: UTI (urinary tract infection)    • Hypertension    • Varicose veins        Assessment:  Active Hospital Problems    Diagnosis  POA   • **Hypernatremia [E87.0]  Yes   • Altered mental status [R41.82]  Unknown   • DUTCH (acute kidney injury) (HCC) [N17.9]  Unknown   • Severe malnutrition (HCC) [E43]  Yes   • DNR (do not resuscitate) [Z66]  Yes   • Malnutrition (HCC) [E46]  Yes   • Age-related physical debility [R54]  Yes   • Alzheimer's disease (HCC) [G30.9, F02.80]  Yes   • Hypercholesterolemia [E78.00]  Yes   • BP (high blood pressure) [I10]  Yes   • OP (osteoporosis) [M81.0]  Yes      Resolved Hospital Problems   No resolved problems to display.       Plan:  [place] appears to be poor.  Family wants to continue with tube feedings and see how she does.  We will continue and follow-up lab.  Family reportedly not ready to go for palliative care yet.  [place] remains poor.  If no improvement in a day or so we need to revisit palliative care with the family.  Discussed with nurse and case management.  Morgan Mcrae MD  12/9/2022  15:05 EST

## 2022-12-09 NOTE — PLAN OF CARE
Goal Outcome Evaluation:           Progress: no change     Patient remains in 2 point bilateral wrist restraints due to confusion and pulling at [place], Patient has tube feeds continuing at [facility] going at 40cc now. Patient had some electrolytes replaced today her phos was 2.2 and potassium is 3.2. Had 4 runs of potassium and potassium phos 9mmol. Tolerated well    Strong peripheral pulses/Capillary refill less/equal to 2 seconds

## 2022-12-10 LAB
ALBUMIN SERPL-MCNC: 2.4 G/DL (ref 3.5–5.2)
ANION GAP SERPL CALCULATED.3IONS-SCNC: 5.3 MMOL/L (ref 5–15)
BASOPHILS # BLD AUTO: 0.01 10*3/MM3 (ref 0–0.2)
BASOPHILS NFR BLD AUTO: 0.2 % (ref 0–1.5)
BUN SERPL-MCNC: 8 MG/DL (ref 8–23)
BUN/CREAT SERPL: 25.8 (ref 7–25)
CALCIUM SPEC-SCNC: 7.7 MG/DL (ref 8.6–10.5)
CHLORIDE SERPL-SCNC: 106 MMOL/L (ref 98–107)
CO2 SERPL-SCNC: 22.7 MMOL/L (ref 22–29)
CREAT SERPL-MCNC: 0.31 MG/DL (ref 0.57–1)
DEPRECATED RDW RBC AUTO: 43.8 FL (ref 37–54)
EGFRCR SERPLBLD CKD-EPI 2021: 102 ML/MIN/1.73
EOSINOPHIL # BLD AUTO: 0.12 10*3/MM3 (ref 0–0.4)
EOSINOPHIL NFR BLD AUTO: 2.2 % (ref 0.3–6.2)
ERYTHROCYTE [DISTWIDTH] IN BLOOD BY AUTOMATED COUNT: 13.4 % (ref 12.3–15.4)
GLUCOSE BLDC GLUCOMTR-MCNC: 102 MG/DL (ref 70–130)
GLUCOSE BLDC GLUCOMTR-MCNC: 108 MG/DL (ref 70–130)
GLUCOSE BLDC GLUCOMTR-MCNC: 108 MG/DL (ref 70–130)
GLUCOSE BLDC GLUCOMTR-MCNC: 112 MG/DL (ref 70–130)
GLUCOSE BLDC GLUCOMTR-MCNC: 122 MG/DL (ref 70–130)
GLUCOSE SERPL-MCNC: 103 MG/DL (ref 65–99)
HCT VFR BLD AUTO: 28.1 % (ref 34–46.6)
HGB BLD-MCNC: 10 G/DL (ref 12–15.9)
LYMPHOCYTES # BLD AUTO: 1.5 10*3/MM3 (ref 0.7–3.1)
LYMPHOCYTES NFR BLD AUTO: 27.3 % (ref 19.6–45.3)
MCH RBC QN AUTO: 32.1 PG (ref 26.6–33)
MCHC RBC AUTO-ENTMCNC: 35.6 G/DL (ref 31.5–35.7)
MCV RBC AUTO: 90.1 FL (ref 79–97)
MONOCYTES # BLD AUTO: 0.55 10*3/MM3 (ref 0.1–0.9)
MONOCYTES NFR BLD AUTO: 10 % (ref 5–12)
NEUTROPHILS NFR BLD AUTO: 3.25 10*3/MM3 (ref 1.7–7)
NEUTROPHILS NFR BLD AUTO: 59.2 % (ref 42.7–76)
PHOSPHATE SERPL-MCNC: 2 MG/DL (ref 2.5–4.5)
PLATELET # BLD AUTO: 137 10*3/MM3 (ref 140–450)
PMV BLD AUTO: 11.9 FL (ref 6–12)
POTASSIUM SERPL-SCNC: 3.9 MMOL/L (ref 3.5–5.2)
RBC # BLD AUTO: 3.12 10*6/MM3 (ref 3.77–5.28)
SODIUM SERPL-SCNC: 134 MMOL/L (ref 136–145)
WBC NRBC COR # BLD: 5.49 10*3/MM3 (ref 3.4–10.8)

## 2022-12-10 PROCEDURE — 82962 GLUCOSE BLOOD TEST: CPT

## 2022-12-10 PROCEDURE — 85025 COMPLETE CBC W/AUTO DIFF WBC: CPT | Performed by: INTERNAL MEDICINE

## 2022-12-10 PROCEDURE — 80069 RENAL FUNCTION PANEL: CPT | Performed by: INTERNAL MEDICINE

## 2022-12-10 RX ADMIN — SODIUM CHLORIDE 500 ML: 9 INJECTION, SOLUTION INTRAVENOUS at 08:52

## 2022-12-10 RX ADMIN — Medication 10 ML: at 20:11

## 2022-12-10 RX ADMIN — SODIUM CHLORIDE 50 ML/HR: 9 INJECTION, SOLUTION INTRAVENOUS at 17:35

## 2022-12-10 RX ADMIN — POTASSIUM PHOSPHATE, MONOBASIC AND POTASSIUM PHOSPHATE, DIBASIC 9 MMOL: 224; 236 INJECTION, SOLUTION, CONCENTRATE INTRAVENOUS at 11:36

## 2022-12-10 RX ADMIN — Medication 10 ML: at 14:29

## 2022-12-10 NOTE — PROGRESS NOTES
"DAILY PROGRESS NOTE  [facility]    Patient Identification:  Name: Mary Alice Sanders  Age: 87 y.o.  Sex: female  :  1935  MRN: [id number]         Primary Care Physician: Gera Barker MD    Subjective:  Interval History: The patient is confused and not talking.  Objective:    Scheduled Meds:insulin regular, 0-7 Units, Subcutaneous, Q6H  potassium phosphate, 15 mmol, Intravenous, Once  sodium chloride, 10 mL, Intravenous, Q12H      Continuous Infusions:sodium chloride, 50 mL/hr, Last Rate: 50 mL/hr (22 1839)        Vital signs in last 24 hours:  Temp:  [97.6 °F (36.4 °C)-99.2 °F (37.3 °C)] 98.4 °F (36.9 °C)  Heart Rate:  [] 77  Resp:  [16-18] 18  BP: ()/(62-80) 104/74    Intake/Output:    Intake/Output Summary (Last 24 hours) at 12/10/2022 1410  Last data filed at 12/10/2022 0952  Gross per 24 hour   Intake 1306 ml   Output 1750 ml   Net -444 ml       Exam:  /74 (BP Location: Left arm, Patient Position: Lying)   Pulse 77   Temp 98.4 °F (36.9 °C) (Oral)   Resp 18   Ht 165.1 cm (65\")   Wt 59.3 kg (130 lb 12.8 oz)   SpO2 96%   BMI 21.77 kg/m²     General Appearance:   Confused, no distress   Head:    Normocephalic, without obvious abnormality, atraumatic   Eyes:       Throat:   Lips, tongue, gums normal   Neck:   Supple, symmetrical, trachea midline, no JVD   Lungs:     Clear to auscultation bilaterally, respirations unlabored   Chest Wall:    No tenderness or deformity    Heart:    Regular rate and rhythm, S1 and S2 normal, no murmur,no  Rub or gallop   Abdomen:     Soft, nontender, bowel sounds active, no masses, no organomegaly    Extremities:   Extremities normal, atraumatic, no cyanosis or edema   Pulses:      Skin:   Skin is warm and dry,  no rashes or palpable lesions   Neurologic:   no focal deficits noted, confused and demented.  Patient is not talking.      Lab Results (last 72 hours)     Procedure Component Value Units Date/Time    Magnesium [[id number]]  " Last OV  7-18-17    Next OV 1-22-17      Last written 7-31-17 (Normal) Collected: 12/07/22 0406    Specimen: Blood Updated: 12/07/22 1131     Magnesium 2.0 mg/dL     POC Glucose Once [[id number]]  (Normal) Collected: 12/07/22 1104    Specimen: Blood Updated: 12/07/22 1105     Glucose 118 mg/dL      Comment: Meter: [id number] : [id number] Charlotte Damian NA       POC Glucose Once [[id number]]  (Normal) Collected: 12/07/22 0633    Specimen: Blood Updated: 12/07/22 0634     Glucose 98 mg/dL      Comment: Meter: [id number] : [id number] Osorio PLATT       Renal Function Panel [[id number]]  (Abnormal) Collected: 12/07/22 0406    Specimen: Blood Updated: 12/07/22 0447     Glucose 88 mg/dL      BUN 9 mg/dL      Creatinine 0.40 mg/dL      Sodium 142 mmol/L      Potassium 3.1 mmol/L      Chloride 112 mmol/L      CO2 18.1 mmol/L      Calcium 7.6 mg/dL      Albumin 2.40 g/dL      Phosphorus 2.0 mg/dL      Anion Gap 11.9 mmol/L      BUN/Creatinine Ratio 22.5     eGFR 95.9 mL/min/1.73      Comment: National Kidney Foundation and American Society of Nephrology (ASN) Task Force recommended calculation based on the Chronic Kidney Disease Epidemiology Collaboration (CKD-EPI) equation refit without adjustment for race.       Narrative:      GFR Normal >60  Chronic Kidney Disease <60  Kidney Failure <15    The GFR formula is only valid for adults with stable renal function between ages 18 and 70.    CBC & Differential [[id number]]  (Abnormal) Collected: 12/07/22 0406    Specimen: Blood Updated: 12/07/22 0440    Narrative:      The following orders were created for panel order CBC & Differential.  Procedure                               Abnormality         Status                     ---------                               -----------         ------                     CBC Auto Differential[[id number]]        Abnormal            Final result                 Please view results for these tests on the individual orders.    CBC Auto Differential [[id number]]  (Abnormal) Collected: 12/07/22 0406     Specimen: Blood Updated: 12/07/22 0440     WBC 4.52 10*3/mm3      RBC 3.15 10*6/mm3      Hemoglobin 10.2 g/dL      Hematocrit 29.9 %      MCV 94.9 fL      MCH 32.4 pg      MCHC 34.1 g/dL      RDW 13.6 %      RDW-SD 45.7 fl      MPV 13.3 fL      Platelets 93 10*3/mm3      Neutrophil % 62.4 %      Lymphocyte % 23.7 %      Monocyte % 10.4 %      Eosinophil % 1.5 %      Basophil % 0.2 %      Neutrophils, Absolute 2.82 10*3/mm3      Lymphocytes, Absolute 1.07 10*3/mm3      Monocytes, Absolute 0.47 10*3/mm3      Eosinophils, Absolute 0.07 10*3/mm3      Basophils, Absolute 0.01 10*3/mm3     Phosphorus [[id number]]  (Abnormal) Collected: 12/06/22 2300    Specimen: Blood Updated: 12/06/22 2333     Phosphorus 2.2 mg/dL     POC Glucose Once [[id number]]  (Normal) Collected: 12/06/22 2319    Specimen: Blood Updated: 12/06/22 2320     Glucose 96 mg/dL      Comment: Meter: [id number] : [id number] Osorio Kaye NA       POC Glucose Once [[id number]]  (Normal) Collected: 12/06/22 2014    Specimen: Blood Updated: 12/06/22 2016     Glucose 96 mg/dL      Comment: Meter: [id number] : [id number] Osorio Kaye NA       POC Glucose Once [[id number]]  (Normal) Collected: 12/06/22 1611    Specimen: Blood Updated: 12/06/22 1612     Glucose 88 mg/dL      Comment: Meter: [id number] : [id number] Aaerike Rickie NA       DESHAWN + PE [[id number]]  (Abnormal) Collected: 12/04/22 0941    Specimen: Blood Updated: 12/06/22 1414     IgG 593 mg/dL      IgA 161 mg/dL      IgM 159 mg/dL      Total Protein 4.8 g/dL      Albumin 2.5 g/dL      Alpha-1-Globulin 0.3 g/dL      Alpha-2-Globulin 0.7 g/dL      Beta Globulin 0.7 g/dL      Gamma Globulin 0.5 g/dL      M-Wing Not Observed g/dL      Globulin 2.3 g/dL      A/G Ratio 1.1     Immunofixation Reflex, Serum Comment     Comment: No monoclonality detected.        Please note Comment     Comment: Protein electrophoresis scan will follow via computer, mail, or   delivery.       Narrative:       Performed at:  [address] [place] [address]  [id number]  : Payam Monteiro PhD, Phone:  [phone]    POC Glucose Once [[id number]]  (Normal) Collected: 12/06/22 1100    Specimen: Blood Updated: 12/06/22 1101     Glucose 102 mg/dL      Comment: Meter: [id number] : [id number] Charlotte PLATT       CBC & Differential [[id number]]  (Abnormal) Collected: 12/06/22 0358    Specimen: Blood Updated: 12/06/22 0654    Narrative:      The following orders were created for panel order CBC & Differential.  Procedure                               Abnormality         Status                     ---------                               -----------         ------                     CBC Auto Differential[[id number]]        Abnormal            Final result                 Please view results for these tests on the individual orders.    CBC Auto Differential [[id number]]  (Abnormal) Collected: 12/06/22 0358    Specimen: Blood Updated: 12/06/22 0654     WBC 6.21 10*3/mm3      RBC 3.43 10*6/mm3      Hemoglobin 11.1 g/dL      Hematocrit 31.0 %      MCV 90.4 fL      MCH 32.4 pg      MCHC 35.8 g/dL      RDW 13.0 %      RDW-SD 42.2 fl      MPV 14.3 fL      Platelets 81 10*3/mm3      Neutrophil % 70.1 %      Lymphocyte % 18.4 %      Monocyte % 8.9 %      Eosinophil % 0.8 %      Basophil % 0.2 %      Neutrophils, Absolute 4.36 10*3/mm3      Lymphocytes, Absolute 1.14 10*3/mm3      Monocytes, Absolute 0.55 10*3/mm3      Eosinophils, Absolute 0.05 10*3/mm3      Basophils, Absolute 0.01 10*3/mm3     Renal Function Panel [[id number]]  (Abnormal) Collected: 12/06/22 0358    Specimen: Blood Updated: 12/06/22 0600     Glucose 89 mg/dL      BUN 7 mg/dL      Creatinine 0.48 mg/dL      Sodium 137 mmol/L      Potassium 3.5 mmol/L      Chloride 110 mmol/L      CO2 19.0 mmol/L      Calcium 7.6 mg/dL      Albumin 2.60 g/dL      Phosphorus 1.8 mg/dL      Anion Gap 8.0 mmol/L      BUN/Creatinine Ratio 14.6     eGFR 91.8  mL/min/1.73      Comment: National Kidney Foundation and American Society of Nephrology (ASN) Task Force recommended calculation based on the Chronic Kidney Disease Epidemiology Collaboration (CKD-EPI) equation refit without adjustment for race.       Narrative:      GFR Normal >60  Chronic Kidney Disease <60  Kidney Failure <15    The GFR formula is only valid for adults with stable renal function between ages 18 and 70.    POC Glucose Once [[id number]]  (Normal) Collected: 12/06/22 0533    Specimen: Blood Updated: 12/06/22 0534     Glucose 95 mg/dL      Comment: Meter: [id number] : [id number] Osorio Kaye NA       POC Glucose Once [[id number]]  (Normal) Collected: 12/06/22 0008    Specimen: Blood Updated: 12/06/22 0009     Glucose 113 mg/dL      Comment: Meter: [id number] : [id number] Osorio Kaye NA       POC Glucose Once [[id number]]  (Normal) Collected: 12/05/22 2006    Specimen: Blood Updated: 12/05/22 2007     Glucose 101 mg/dL      Comment: Meter: [id number] : [id number] Osorio Kaye NA       POC Glucose Once [[id number]]  (Normal) Collected: 12/05/22 1607    Specimen: Blood Updated: 12/05/22 1609     Glucose 100 mg/dL      Comment: Meter: [id number] : [id number] Skyepackey PCA       Blood Culture - Blood, Arm, Left [[id number]]  (Normal) Collected: 11/30/22 1237    Specimen: Blood from Arm, Left Updated: 12/05/22 1315     Blood Culture No growth at 5 days    Blood Culture - Blood, Arm, Right [[id number]]  (Normal) Collected: 11/30/22 1140    Specimen: Blood from Arm, Right Updated: 12/05/22 1200     Blood Culture No growth at 5 days    POC Glucose Once [[id number]]  (Normal) Collected: 12/05/22 1115    Specimen: Blood Updated: 12/05/22 1117     Glucose 125 mg/dL      Comment: Meter: [id number] : [id number] Skyepackey PCA       POC Glucose Once [[id number]]  (Normal) Collected: 12/05/22 0706    Specimen: Blood Updated: 12/05/22 0707     Glucose 114 mg/dL      Comment: Meter:  [id number] : [id number] Finesse PLATT       Renal Function Panel [[id number]]  (Abnormal) Collected: 12/05/22 0359    Specimen: Blood Updated: 12/05/22 0458     Glucose 99 mg/dL      BUN 5 mg/dL      Creatinine 0.52 mg/dL      Sodium 137 mmol/L      Potassium 2.8 mmol/L      Chloride 105 mmol/L      CO2 20.9 mmol/L      Calcium 7.4 mg/dL      Albumin 2.70 g/dL      Phosphorus 1.8 mg/dL      Anion Gap 11.1 mmol/L      BUN/Creatinine Ratio 9.6     eGFR 90.1 mL/min/1.73      Comment: National Kidney Foundation and American Society of Nephrology (ASN) Task Force recommended calculation based on the Chronic Kidney Disease Epidemiology Collaboration (CKD-EPI) equation refit without adjustment for race.       Narrative:      GFR Normal >60  Chronic Kidney Disease <60  Kidney Failure <15    The GFR formula is only valid for adults with stable renal function between ages 18 and 70.    CBC & Differential [[id number]]  (Abnormal) Collected: 12/05/22 0359    Specimen: Blood Updated: 12/05/22 0450    Narrative:      The following orders were created for panel order CBC & Differential.  Procedure                               Abnormality         Status                     ---------                               -----------         ------                     CBC Auto Differential[[id number]]        Abnormal            Final result                 Please view results for these tests on the individual orders.    CBC Auto Differential [[id number]]  (Abnormal) Collected: 12/05/22 0359    Specimen: Blood Updated: 12/05/22 0450     WBC 6.22 10*3/mm3      RBC 3.61 10*6/mm3      Hemoglobin 11.6 g/dL      Hematocrit 34.1 %      MCV 94.5 fL      MCH 32.1 pg      MCHC 34.0 g/dL      RDW 13.1 %      RDW-SD 44.6 fl      MPV 14.3 fL      Platelets 66 10*3/mm3      Neutrophil % 68.2 %      Lymphocyte % 20.1 %      Monocyte % 8.5 %      Eosinophil % 1.1 %      Basophil % 0.3 %      Neutrophils, Absolute 4.24 10*3/mm3      Lymphocytes, Absolute  1.25 10*3/mm3      Monocytes, Absolute 0.53 10*3/mm3      Eosinophils, Absolute 0.07 10*3/mm3      Basophils, Absolute 0.02 10*3/mm3     POC Glucose Once [[id number]]  (Normal) Collected: 12/04/22 2119    Specimen: Blood Updated: 12/04/22 2120     Glucose 108 mg/dL      Comment: Meter: [id number] : [id number] Finesse PLATT       POC Glucose Once [[id number]]  (Normal) Collected: 12/04/22 1614    Specimen: Blood Updated: 12/04/22 1617     Glucose 122 mg/dL      Comment: Meter: [id number] : [id number] Lloyd Meraz CNA           Data Review:  Results from last 7 days   Lab Units 12/10/22  0416 12/09/22  2313 12/09/22  0401 12/08/22  0434   SODIUM mmol/L 134*  --  138 140   POTASSIUM mmol/L 3.9 4.1 3.2* 3.5   CHLORIDE mmol/L 106  --  107 110*   CO2 mmol/L 22.7  --  22.3 21.9*   BUN mg/dL 8  --  8 8   CREATININE mg/dL 0.31*  --  0.31* 0.43*   GLUCOSE mg/dL 103*  --  108* 104*   CALCIUM mg/dL 7.7*  --  7.9* 8.0*     Results from last 7 days   Lab Units 12/10/22  0416 12/09/22  0401 12/08/22  0434   WBC 10*3/mm3 5.49 5.28 4.97   HEMOGLOBIN g/dL 10.0* 10.7* 10.5*   HEMATOCRIT % 28.1* 30.8* 31.2*   PLATELETS 10*3/mm3 137* 126* 108*             Lab Results   Lab Value Date/Time    TROPONINT 0.028 11/30/2022 1140         Results from last 7 days   Lab Units 12/04/22  0400   ALK PHOS U/L 94   BILIRUBIN mg/dL 1.0   ALT (SGPT) U/L 42*   AST (SGOT) U/L 55*             Glucose   Date/Time Value Ref Range Status   12/10/2022 1120 108 70 - 130 mg/dL Final     Comment:     Meter: [id number] : [id number] Beni PLATT   12/10/2022 0621 122 70 - 130 mg/dL Final     Comment:     Meter: [id number] : [id number] Latonya Saez NA   12/10/2022 0002 102 70 - 130 mg/dL Final     Comment:     Meter: [id number] : [id number] Latonya Saez NA   12/09/2022 2119 131 (H) 70 - 130 mg/dL Final     Comment:     Meter: [id number] : [id number] Latonya PLATT   12/09/2022 1600 125 70 - 130 mg/dL Final     Comment:     Meter: [id number]  : [id number] Charlotte Damian NA   12/09/2022 1121 112 70 - 130 mg/dL Final     Comment:     Meter: [id number] : [id number] Charlotte Damian NA   12/09/2022 0626 121 70 - 130 mg/dL Final     Comment:     Meter: [id number] : [id number] Osorio Restrepo NA   12/09/2022 0010 116 70 - 130 mg/dL Final     Comment:     Meter: [id number] : [id number] Osorio PLATT           Past Medical History:   Diagnosis Date   • Allergic rhinitis    • Chest pain    • GERD (gastroesophageal reflux disease)    • Hx of migraine headaches    • Hx: UTI (urinary tract infection)    • Hypertension    • Varicose veins        Assessment:  Active Hospital Problems    Diagnosis  POA   • **Hypernatremia [E87.0]  Yes   • Altered mental status [R41.82]  Unknown   • DUTCH (acute kidney injury) (HCC) [N17.9]  Unknown   • Severe malnutrition (HCC) [E43]  Yes   • DNR (do not resuscitate) [Z66]  Yes   • Malnutrition (HCC) [E46]  Yes   • Age-related physical debility [R54]  Yes   • Alzheimer's disease (HCC) [G30.9, F02.80]  Yes   • Hypercholesterolemia [E78.00]  Yes   • BP (high blood pressure) [I10]  Yes   • OP (osteoporosis) [M81.0]  Yes      Resolved Hospital Problems   No resolved problems to display.       Plan:  [place] appears to be poor.  Family wants to continue with tube feedings and see how she does.  We will continue and follow-up lab.  Family reportedly not ready to go for palliative care yet.  [place] remains poor.  If no improvement in a day or so we need to revisit palliative care with the family.  Discussed with nurse and case management.  Morgan Mcrae MD  12/10/2022  14:10 EST

## 2022-12-10 NOTE — PLAN OF CARE
Problem: Restraint, Nonbehavioral (Nonviolent)  Goal: Absence of Harm or Injury  Outcome: Ongoing, Progressing  Intervention: Implement Least Restrictive Safety Strategies  Description: Consider personal and environmental factors contributing to nonviolent or self-destructive behavior.  Utilize diversional activity/alternative device protection.  Document alternative strategies and less restrictive intervention attempts and their effects.  Clearly describe/record behavior leading to need for restraint.  Use the least restrictive method of restraint.  Recognize restraint should only be used if needed to improve the patient's wellbeing and less restrictive interventions have been determined to be ineffective.  Reassess continued need frequently. Remove restraint as soon as unsafe situation is resolved.  Recent Flowsheet Documentation  Taken 12/10/2022 1800 by Radha Ruiz, RN  Medical Device Protection:  • IV pole/bag removed from visual field  • torso covered  • tubing secured  Less Restrictive Alternative:  • calming techniques promoted  • emotional support provided  • family presence promoted  • positive reinforcement provided  • appropriate expression promoted  • coping techniques promoted  • environment adjusted  • bed alarm in use  • sensory stimulation limited  • safety enhancements provided  • physical activity promoted  De-Escalation Techniques:  • appropriate behavior reinforced  • diversional activity encouraged  • family involvement requested  • increased round frequency  • physical activity promoted  • quiet time facilitated  • reoriented  • stimulation decreased  • verbally redirected  Diversional Activities: television  Taken 12/10/2022 1734 by Radha Ruiz, RN  Medical Device Protection:  • IV pole/bag removed from visual field  • torso covered  • tubing secured  Taken 12/10/2022 1605 by Radha Ruiz, RN  Medical Device Protection:  • IV pole/bag removed from visual field  • torso covered  •  tubing secured  Taken 12/10/2022 1600 by Radha Ruiz RN  Medical Device Protection:  • IV pole/bag removed from visual field  • torso covered  • tubing secured  Less Restrictive Alternative:  • calming techniques promoted  • emotional support provided  • family presence promoted  • positive reinforcement provided  • appropriate expression promoted  • coping techniques promoted  • environment adjusted  • bed alarm in use  • sensory stimulation limited  • safety enhancements provided  • physical activity promoted  De-Escalation Techniques:  • appropriate behavior reinforced  • diversional activity encouraged  • family involvement requested  • increased round frequency  • physical activity promoted  • reoriented  • quiet time facilitated  • stimulation decreased  • verbally redirected  Diversional Activities: television  Taken 12/10/2022 1432 by Radha Ruiz RN  Medical Device Protection:  • IV pole/bag removed from visual field  • tubing secured  • torso covered  Diversional Activities: television  Taken 12/10/2022 1400 by Radha Ruiz RN  Medical Device Protection:  • IV pole/bag removed from visual field  • tubing secured  • torso covered  Less Restrictive Alternative:  • calming techniques promoted  • emotional support provided  • family presence promoted  • positive reinforcement provided  • appropriate expression promoted  • coping techniques promoted  • environment adjusted  • bed alarm in use  • sensory stimulation limited  • safety enhancements provided  • physical activity promoted  De-Escalation Techniques:  • appropriate behavior reinforced  • diversional activity encouraged  • family involvement requested  • increased round frequency  • physical activity promoted  • quiet time facilitated  • reoriented  • stimulation decreased  • verbally redirected  Diversional Activities: television  Taken 12/10/2022 1200 by Radha Ruiz RN  Medical Device Protection:  • IV pole/bag removed from visual  field  • torso covered  • tubing secured  Less Restrictive Alternative:  • calming techniques promoted  • emotional support provided  • family presence promoted  • positive reinforcement provided  • appropriate expression promoted  • coping techniques promoted  • environment adjusted  • bed alarm in use  • sensory stimulation limited  • safety enhancements provided  • physical activity promoted  De-Escalation Techniques:  • verbally redirected  • stimulation decreased  • reoriented  • quiet time facilitated  • physical activity promoted  • increased round frequency  • family involvement requested  • diversional activity encouraged  • appropriate behavior reinforced  Diversional Activities: television  Taken 12/10/2022 1135 by Radha Ruiz RN  Medical Device Protection:  • IV pole/bag removed from visual field  • torso covered  • tubing secured  Taken 12/10/2022 1000 by Radha Ruiz RN  Medical Device Protection:  • IV pole/bag removed from visual field  • torso covered  • tubing secured  Less Restrictive Alternative:  • calming techniques promoted  • emotional support provided  • family presence promoted  • positive reinforcement provided  • appropriate expression promoted  • coping techniques promoted  • environment adjusted  • bed alarm in use  • sensory stimulation limited  • safety enhancements provided  • physical activity promoted  De-Escalation Techniques:  • appropriate behavior reinforced  • diversional activity encouraged  • family involvement requested  • increased round frequency  • physical activity promoted  • quiet time facilitated  • reoriented  • stimulation decreased  • verbally redirected  Diversional Activities: television  Taken 12/10/2022 0800 by Radha Ruiz, RN  Medical Device Protection:  • IV pole/bag removed from visual field  • torso covered  • tubing secured  Less Restrictive Alternative:  • calming techniques promoted  • emotional support provided  • family presence  promoted  • positive reinforcement provided  • appropriate expression promoted  • coping techniques promoted  • environment adjusted  • bed alarm in use  • sensory stimulation limited  • safety enhancements provided  • physical activity promoted  De-Escalation Techniques:  • appropriate behavior reinforced  • diversional activity encouraged  • family involvement requested  • increased round frequency  • physical activity promoted  • quiet time facilitated  • reoriented  • stimulation decreased  • verbally redirected  Diversional Activities: television  Intervention: Protect Dignity, Rights, and Personal Wellbeing  Description: Explain restraint rationale and procedure to patient and family/support person prior to application.  Regularly assess personal needs and for changes in behavior and clinical condition, as well as physical and emotional wellbeing.  Provide reassurance and emotional support.  Recent Flowsheet Documentation  Taken 12/10/2022 1432 by Radha Ruiz RN  Trust Relationship/Rapport:  • thoughts/feelings acknowledged  • reassurance provided  • questions encouraged  • questions answered  • empathic listening provided  • emotional support provided  • choices provided  • care explained  Taken 12/10/2022 0800 by Radha Ruiz RN  Trust Relationship/Rapport:  • thoughts/feelings acknowledged  • reassurance provided  • questions encouraged  • questions answered  • empathic listening provided  • emotional support provided  • care explained  • choices provided  Intervention: Protect Skin and Joint Integrity  Description: Frequently check restraint application site and document findings.  Release and replace at regular intervals per facility protocol.  Assist with frequent joint range of motion activity.  Recent Flowsheet Documentation  Taken 12/10/2022 1734 by Radha Ruiz, RN  Body Position:  • tilted  • left  • 30 degrees  Taken 12/10/2022 1605 by Radha Ruiz, RN  Body Position:  • tilted  •  left  • 30 degrees  Taken 12/10/2022 1432 by Radha Ruiz RN  Body Position:  • tilted  • right  • 30 degrees  Range of Motion: ROM (range of motion) performed  Taken 12/10/2022 1135 by Radha Ruiz RN  Body Position:  • tilted  • right  • 30 degrees  Taken 12/10/2022 0800 by Radha Ruiz RN  Body Position:  • tilted  • right  Range of Motion: ROM (range of motion) performed   Goal Outcome Evaluation:

## 2022-12-10 NOTE — PLAN OF CARE
Problem: Restraint, Nonbehavioral (Nonviolent)  Goal: Absence of Harm or Injury  Outcome: Ongoing, Progressing  Intervention: Implement Least Restrictive Safety Strategies  Recent Flowsheet Documentation  Taken 12/10/2022 0400 by Desiree Mcclellan RN  Medical Device Protection:   IV pole/bag removed from visual field   tubing secured  Less Restrictive Alternative:   bed alarm in use   calming techniques promoted   sensory stimulation limited  De-Escalation Techniques:   reoriented   stimulation decreased   time out facilitated  Diversional Activities: television  Taken 12/10/2022 0200 by Desiree Mcclellan RN  Medical Device Protection: IV pole/bag removed from visual field  Less Restrictive Alternative:   bed alarm in use   calming techniques promoted   sensory stimulation limited  De-Escalation Techniques:   reoriented   stimulation decreased   quiet time facilitated  Diversional Activities: television  Taken 12/10/2022 0000 by Desiree Mcclellan RN  Medical Device Protection: IV pole/bag removed from visual field  Less Restrictive Alternative:   bed alarm in use   calming techniques promoted   sensory stimulation limited  De-Escalation Techniques:   reoriented   stimulation decreased   quiet time facilitated  Diversional Activities: television  Taken 12/9/2022 2200 by Desiree Mcclellan RN  Medical Device Protection:   IV pole/bag removed from visual field   torso covered   tubing secured  Less Restrictive Alternative:   bed alarm in use   calming techniques promoted   sensory stimulation limited  De-Escalation Techniques:   reoriented   quiet time facilitated   stimulation decreased  Diversional Activities: television  Taken 12/9/2022 2000 by Desiree Mcclellan RN  Medical Device Protection: IV pole/bag removed from visual field  Less Restrictive Alternative:   bed alarm in use   calming techniques promoted   sensory stimulation limited  De-Escalation Techniques:   reoriented   stimulation decreased   quiet time  facilitated  Diversional Activities: television  Intervention: Protect Skin and Joint Integrity  Recent Flowsheet Documentation  Taken 12/10/2022 0000 by Desiree Mcclellan RN  Body Position:   left   tilted  Taken 12/9/2022 2200 by Desiree Mcclellan RN  Body Position:   right   tilted   Goal Outcome Evaluation:           Progress: no change  Outcome Evaluation: Pt has been non verbal and does not follow commands. Cortack and tube feeds continued. Restraints to prevent pulling out Cortrack, Incontinence care. IVF. Q2. VSS. No acute distress noted.

## 2022-12-11 LAB
ALBUMIN SERPL-MCNC: 2.3 G/DL (ref 3.5–5.2)
ANION GAP SERPL CALCULATED.3IONS-SCNC: 7 MMOL/L (ref 5–15)
BASOPHILS # BLD AUTO: 0.01 10*3/MM3 (ref 0–0.2)
BASOPHILS NFR BLD AUTO: 0.2 % (ref 0–1.5)
BUN SERPL-MCNC: 8 MG/DL (ref 8–23)
BUN/CREAT SERPL: 32 (ref 7–25)
CALCIUM SPEC-SCNC: 7.8 MG/DL (ref 8.6–10.5)
CHLORIDE SERPL-SCNC: 108 MMOL/L (ref 98–107)
CO2 SERPL-SCNC: 22 MMOL/L (ref 22–29)
CREAT SERPL-MCNC: 0.25 MG/DL (ref 0.57–1)
DEPRECATED RDW RBC AUTO: 45.3 FL (ref 37–54)
EGFRCR SERPLBLD CKD-EPI 2021: 107.4 ML/MIN/1.73
EOSINOPHIL # BLD AUTO: 0.1 10*3/MM3 (ref 0–0.4)
EOSINOPHIL NFR BLD AUTO: 2 % (ref 0.3–6.2)
ERYTHROCYTE [DISTWIDTH] IN BLOOD BY AUTOMATED COUNT: 13.6 % (ref 12.3–15.4)
GLUCOSE BLDC GLUCOMTR-MCNC: 108 MG/DL (ref 70–130)
GLUCOSE BLDC GLUCOMTR-MCNC: 113 MG/DL (ref 70–130)
GLUCOSE BLDC GLUCOMTR-MCNC: 122 MG/DL (ref 70–130)
GLUCOSE SERPL-MCNC: 106 MG/DL (ref 65–99)
HCT VFR BLD AUTO: 27.1 % (ref 34–46.6)
HGB BLD-MCNC: 9.3 G/DL (ref 12–15.9)
LYMPHOCYTES # BLD AUTO: 1.27 10*3/MM3 (ref 0.7–3.1)
LYMPHOCYTES NFR BLD AUTO: 25.4 % (ref 19.6–45.3)
MCH RBC QN AUTO: 32 PG (ref 26.6–33)
MCHC RBC AUTO-ENTMCNC: 34.3 G/DL (ref 31.5–35.7)
MCV RBC AUTO: 93.1 FL (ref 79–97)
MONOCYTES # BLD AUTO: 0.49 10*3/MM3 (ref 0.1–0.9)
MONOCYTES NFR BLD AUTO: 9.8 % (ref 5–12)
NEUTROPHILS NFR BLD AUTO: 3.09 10*3/MM3 (ref 1.7–7)
NEUTROPHILS NFR BLD AUTO: 61.8 % (ref 42.7–76)
PHOSPHATE SERPL-MCNC: 2.3 MG/DL (ref 2.5–4.5)
PHOSPHATE SERPL-MCNC: 2.3 MG/DL (ref 2.5–4.5)
PLATELET # BLD AUTO: 146 10*3/MM3 (ref 140–450)
PMV BLD AUTO: 11.9 FL (ref 6–12)
POTASSIUM SERPL-SCNC: 3.6 MMOL/L (ref 3.5–5.2)
RBC # BLD AUTO: 2.91 10*6/MM3 (ref 3.77–5.28)
SODIUM SERPL-SCNC: 137 MMOL/L (ref 136–145)
WBC NRBC COR # BLD: 5 10*3/MM3 (ref 3.4–10.8)

## 2022-12-11 PROCEDURE — 85025 COMPLETE CBC W/AUTO DIFF WBC: CPT | Performed by: INTERNAL MEDICINE

## 2022-12-11 PROCEDURE — 84100 ASSAY OF PHOSPHORUS: CPT | Performed by: HOSPITALIST

## 2022-12-11 PROCEDURE — 82962 GLUCOSE BLOOD TEST: CPT

## 2022-12-11 PROCEDURE — 80069 RENAL FUNCTION PANEL: CPT | Performed by: INTERNAL MEDICINE

## 2022-12-11 RX ADMIN — SODIUM CHLORIDE 50 ML/HR: 9 INJECTION, SOLUTION INTRAVENOUS at 16:47

## 2022-12-11 RX ADMIN — POTASSIUM PHOSPHATE, MONOBASIC AND POTASSIUM PHOSPHATE, DIBASIC 9 MMOL: 224; 236 INJECTION, SOLUTION, CONCENTRATE INTRAVENOUS at 11:32

## 2022-12-11 RX ADMIN — Medication 10 ML: at 14:15

## 2022-12-11 NOTE — PROGRESS NOTES
"DAILY PROGRESS NOTE  [facility]    Patient Identification:  Name: MaryA lice Sanders  Age: 87 y.o.  Sex: female  :  1935  MRN: [id number]         Primary Care Physician: Gera Barker MD    Subjective:  Interval History: The patient is confused and not talking.  Objective:    Scheduled Meds:insulin regular, 0-7 Units, Subcutaneous, Q6H  potassium phosphate, 15 mmol, Intravenous, Once  sodium chloride, 10 mL, Intravenous, Q12H      Continuous Infusions:sodium chloride, 50 mL/hr, Last Rate: 50 mL/hr (12/10/22 1735)        Vital signs in last 24 hours:  Temp:  [98.4 °F (36.9 °C)-98.8 °F (37.1 °C)] 98.6 °F (37 °C)  Heart Rate:  [74-94] 94  Resp:  [14-18] 18  BP: ()/(61-92) 113/64    Intake/Output:    Intake/Output Summary (Last 24 hours) at 2022 1619  Last data filed at 2022 0503  Gross per 24 hour   Intake 0 ml   Output 800 ml   Net -800 ml       Exam:  /64 (BP Location: Left arm, Patient Position: Lying)   Pulse 94   Temp 98.6 °F (37 °C) (Oral)   Resp 18   Ht 165.1 cm (65\")   Wt 59.4 kg (131 lb)   SpO2 95%   BMI 21.80 kg/m²     General Appearance:   Confused, no distress   Head:    Normocephalic, without obvious abnormality, atraumatic   Eyes:       Throat:   Lips, tongue, gums normal   Neck:   Supple, symmetrical, trachea midline, no JVD   Lungs:     Clear to auscultation bilaterally, respirations unlabored   Chest Wall:    No tenderness or deformity    Heart:    Regular rate and rhythm, S1 and S2 normal, no murmur,no  Rub or gallop   Abdomen:     Soft, nontender, bowel sounds active, no masses, no organomegaly    Extremities:   Extremities normal, atraumatic, no cyanosis or edema   Pulses:      Skin:   Skin is warm and dry,  no rashes or palpable lesions   Neurologic:   no focal deficits noted, confused and demented.  Patient is not  talking.      Lab Results (last 72 hours)     Procedure Component Value Units Date/Time    Magnesium [[id number]]  (Normal) " Collected: 12/07/22 0406    Specimen: Blood Updated: 12/07/22 1131     Magnesium 2.0 mg/dL     POC Glucose Once [[id number]]  (Normal) Collected: 12/07/22 1104    Specimen: Blood Updated: 12/07/22 1105     Glucose 118 mg/dL      Comment: Meter: [id number] : [id number] Charlotte Damian NA       POC Glucose Once [[id number]]  (Normal) Collected: 12/07/22 0633    Specimen: Blood Updated: 12/07/22 0634     Glucose 98 mg/dL      Comment: Meter: [id number] : [id number] Osorio PLATT       Renal Function Panel [[id number]]  (Abnormal) Collected: 12/07/22 0406    Specimen: Blood Updated: 12/07/22 0447     Glucose 88 mg/dL      BUN 9 mg/dL      Creatinine 0.40 mg/dL      Sodium 142 mmol/L      Potassium 3.1 mmol/L      Chloride 112 mmol/L      CO2 18.1 mmol/L      Calcium 7.6 mg/dL      Albumin 2.40 g/dL      Phosphorus 2.0 mg/dL      Anion Gap 11.9 mmol/L      BUN/Creatinine Ratio 22.5     eGFR 95.9 mL/min/1.73      Comment: National Kidney Foundation and American Society of Nephrology (ASN) Task Force recommended calculation based on the Chronic Kidney Disease Epidemiology Collaboration (CKD-EPI) equation refit without adjustment for race.       Narrative:      GFR Normal >60  Chronic Kidney Disease <60  Kidney Failure <15    The GFR formula is only valid for adults with stable renal function between ages 18 and 70.    CBC & Differential [[id number]]  (Abnormal) Collected: 12/07/22 0406    Specimen: Blood Updated: 12/07/22 0440    Narrative:      The following orders were created for panel order CBC & Differential.  Procedure                               Abnormality         Status                     ---------                               -----------         ------                     CBC Auto Differential[[id number]]        Abnormal            Final result                 Please view results for these tests on the individual orders.    CBC Auto Differential [[id number]]  (Abnormal) Collected: 12/07/22 0406     Specimen: Blood Updated: 12/07/22 0440     WBC 4.52 10*3/mm3      RBC 3.15 10*6/mm3      Hemoglobin 10.2 g/dL      Hematocrit 29.9 %      MCV 94.9 fL      MCH 32.4 pg      MCHC 34.1 g/dL      RDW 13.6 %      RDW-SD 45.7 fl      MPV 13.3 fL      Platelets 93 10*3/mm3      Neutrophil % 62.4 %      Lymphocyte % 23.7 %      Monocyte % 10.4 %      Eosinophil % 1.5 %      Basophil % 0.2 %      Neutrophils, Absolute 2.82 10*3/mm3      Lymphocytes, Absolute 1.07 10*3/mm3      Monocytes, Absolute 0.47 10*3/mm3      Eosinophils, Absolute 0.07 10*3/mm3      Basophils, Absolute 0.01 10*3/mm3     Phosphorus [[id number]]  (Abnormal) Collected: 12/06/22 2300    Specimen: Blood Updated: 12/06/22 2333     Phosphorus 2.2 mg/dL     POC Glucose Once [[id number]]  (Normal) Collected: 12/06/22 2319    Specimen: Blood Updated: 12/06/22 2320     Glucose 96 mg/dL      Comment: Meter: [id number] : [id number] Osorio Kaye NA       POC Glucose Once [[id number]]  (Normal) Collected: 12/06/22 2014    Specimen: Blood Updated: 12/06/22 2016     Glucose 96 mg/dL      Comment: Meter: [id number] : [id number] Osorio Kaye NA       POC Glucose Once [[id number]]  (Normal) Collected: 12/06/22 1611    Specimen: Blood Updated: 12/06/22 1612     Glucose 88 mg/dL      Comment: Meter: [id number] : [id number] Aaerike Rickie NA       DESHAWN + PE [[id number]]  (Abnormal) Collected: 12/04/22 0941    Specimen: Blood Updated: 12/06/22 1414     IgG 593 mg/dL      IgA 161 mg/dL      IgM 159 mg/dL      Total Protein 4.8 g/dL      Albumin 2.5 g/dL      Alpha-1-Globulin 0.3 g/dL      Alpha-2-Globulin 0.7 g/dL      Beta Globulin 0.7 g/dL      Gamma Globulin 0.5 g/dL      M-Wing Not Observed g/dL      Globulin 2.3 g/dL      A/G Ratio 1.1     Immunofixation Reflex, Serum Comment     Comment: No monoclonality detected.        Please note Comment     Comment: Protein electrophoresis scan will follow via computer, mail, or   delivery.       Narrative:       Performed at:  [address] [place] [address]  [id number]  : Payam Monteiro PhD, Phone:  [phone]    POC Glucose Once [[id number]]  (Normal) Collected: 12/06/22 1100    Specimen: Blood Updated: 12/06/22 1101     Glucose 102 mg/dL      Comment: Meter: [id number] : [id number] Charlotte LPATT       CBC & Differential [[id number]]  (Abnormal) Collected: 12/06/22 0358    Specimen: Blood Updated: 12/06/22 0654    Narrative:      The following orders were created for panel order CBC & Differential.  Procedure                               Abnormality         Status                     ---------                               -----------         ------                     CBC Auto Differential[[id number]]        Abnormal            Final result                 Please view results for these tests on the individual orders.    CBC Auto Differential [[id number]]  (Abnormal) Collected: 12/06/22 0358    Specimen: Blood Updated: 12/06/22 0654     WBC 6.21 10*3/mm3      RBC 3.43 10*6/mm3      Hemoglobin 11.1 g/dL      Hematocrit 31.0 %      MCV 90.4 fL      MCH 32.4 pg      MCHC 35.8 g/dL      RDW 13.0 %      RDW-SD 42.2 fl      MPV 14.3 fL      Platelets 81 10*3/mm3      Neutrophil % 70.1 %      Lymphocyte % 18.4 %      Monocyte % 8.9 %      Eosinophil % 0.8 %      Basophil % 0.2 %      Neutrophils, Absolute 4.36 10*3/mm3      Lymphocytes, Absolute 1.14 10*3/mm3      Monocytes, Absolute 0.55 10*3/mm3      Eosinophils, Absolute 0.05 10*3/mm3      Basophils, Absolute 0.01 10*3/mm3     Renal Function Panel [[id number]]  (Abnormal) Collected: 12/06/22 0358    Specimen: Blood Updated: 12/06/22 0600     Glucose 89 mg/dL      BUN 7 mg/dL      Creatinine 0.48 mg/dL      Sodium 137 mmol/L      Potassium 3.5 mmol/L      Chloride 110 mmol/L      CO2 19.0 mmol/L      Calcium 7.6 mg/dL      Albumin 2.60 g/dL      Phosphorus 1.8 mg/dL      Anion Gap 8.0 mmol/L      BUN/Creatinine Ratio 14.6     eGFR 91.8  mL/min/1.73      Comment: National Kidney Foundation and American Society of Nephrology (ASN) Task Force recommended calculation based on the Chronic Kidney Disease Epidemiology Collaboration (CKD-EPI) equation refit without adjustment for race.       Narrative:      GFR Normal >60  Chronic Kidney Disease <60  Kidney Failure <15    The GFR formula is only valid for adults with stable renal function between ages 18 and 70.    POC Glucose Once [[id number]]  (Normal) Collected: 12/06/22 0533    Specimen: Blood Updated: 12/06/22 0534     Glucose 95 mg/dL      Comment: Meter: [id number] : [id number] Osorio Kaye NA       POC Glucose Once [[id number]]  (Normal) Collected: 12/06/22 0008    Specimen: Blood Updated: 12/06/22 0009     Glucose 113 mg/dL      Comment: Meter: [id number] : [id number] Osorio Kaye NA       POC Glucose Once [[id number]]  (Normal) Collected: 12/05/22 2006    Specimen: Blood Updated: 12/05/22 2007     Glucose 101 mg/dL      Comment: Meter: [id number] : [id number] Osorio Kaye NA       POC Glucose Once [[id number]]  (Normal) Collected: 12/05/22 1607    Specimen: Blood Updated: 12/05/22 1609     Glucose 100 mg/dL      Comment: Meter: [id number] : [id number] Topadmitey PCA       Blood Culture - Blood, Arm, Left [[id number]]  (Normal) Collected: 11/30/22 1237    Specimen: Blood from Arm, Left Updated: 12/05/22 1315     Blood Culture No growth at 5 days    Blood Culture - Blood, Arm, Right [[id number]]  (Normal) Collected: 11/30/22 1140    Specimen: Blood from Arm, Right Updated: 12/05/22 1200     Blood Culture No growth at 5 days    POC Glucose Once [[id number]]  (Normal) Collected: 12/05/22 1115    Specimen: Blood Updated: 12/05/22 1117     Glucose 125 mg/dL      Comment: Meter: [id number] : [id number] Topadmitey PCA       POC Glucose Once [[id number]]  (Normal) Collected: 12/05/22 0706    Specimen: Blood Updated: 12/05/22 0707     Glucose 114 mg/dL      Comment: Meter:  [id number] : [id number] Finesse PLATT       Renal Function Panel [[id number]]  (Abnormal) Collected: 12/05/22 0359    Specimen: Blood Updated: 12/05/22 0458     Glucose 99 mg/dL      BUN 5 mg/dL      Creatinine 0.52 mg/dL      Sodium 137 mmol/L      Potassium 2.8 mmol/L      Chloride 105 mmol/L      CO2 20.9 mmol/L      Calcium 7.4 mg/dL      Albumin 2.70 g/dL      Phosphorus 1.8 mg/dL      Anion Gap 11.1 mmol/L      BUN/Creatinine Ratio 9.6     eGFR 90.1 mL/min/1.73      Comment: National Kidney Foundation and American Society of Nephrology (ASN) Task Force recommended calculation based on the Chronic Kidney Disease Epidemiology Collaboration (CKD-EPI) equation refit without adjustment for race.       Narrative:      GFR Normal >60  Chronic Kidney Disease <60  Kidney Failure <15    The GFR formula is only valid for adults with stable renal function between ages 18 and 70.    CBC & Differential [[id number]]  (Abnormal) Collected: 12/05/22 0359    Specimen: Blood Updated: 12/05/22 0450    Narrative:      The following orders were created for panel order CBC & Differential.  Procedure                               Abnormality         Status                     ---------                               -----------         ------                     CBC Auto Differential[[id number]]        Abnormal            Final result                 Please view results for these tests on the individual orders.    CBC Auto Differential [[id number]]  (Abnormal) Collected: 12/05/22 0359    Specimen: Blood Updated: 12/05/22 0450     WBC 6.22 10*3/mm3      RBC 3.61 10*6/mm3      Hemoglobin 11.6 g/dL      Hematocrit 34.1 %      MCV 94.5 fL      MCH 32.1 pg      MCHC 34.0 g/dL      RDW 13.1 %      RDW-SD 44.6 fl      MPV 14.3 fL      Platelets 66 10*3/mm3      Neutrophil % 68.2 %      Lymphocyte % 20.1 %      Monocyte % 8.5 %      Eosinophil % 1.1 %      Basophil % 0.3 %      Neutrophils, Absolute 4.24 10*3/mm3      Lymphocytes, Absolute  1.25 10*3/mm3      Monocytes, Absolute 0.53 10*3/mm3      Eosinophils, Absolute 0.07 10*3/mm3      Basophils, Absolute 0.02 10*3/mm3     POC Glucose Once [[id number]]  (Normal) Collected: 12/04/22 2119    Specimen: Blood Updated: 12/04/22 2120     Glucose 108 mg/dL      Comment: Meter: [id number] : [id number] Finesse Ott NA       POC Glucose Once [[id number]]  (Normal) Collected: 12/04/22 1614    Specimen: Blood Updated: 12/04/22 1617     Glucose 122 mg/dL      Comment: Meter: [id number] : [id number] Lloyd Meraz CNA           Data Review:  Results from last 7 days   Lab Units 12/11/22  0406 12/10/22  0416 12/09/22  2313 12/09/22  0401   SODIUM mmol/L 137 134*  --  138   POTASSIUM mmol/L 3.6 3.9 4.1 3.2*   CHLORIDE mmol/L 108* 106  --  107   CO2 mmol/L 22.0 22.7  --  22.3   BUN mg/dL 8 8  --  8   CREATININE mg/dL 0.25* 0.31*  --  0.31*   GLUCOSE mg/dL 106* 103*  --  108*   CALCIUM mg/dL 7.8* 7.7*  --  7.9*     Results from last 7 days   Lab Units 12/11/22  0406 12/10/22  0416 12/09/22  0401   WBC 10*3/mm3 5.00 5.49 5.28   HEMOGLOBIN g/dL 9.3* 10.0* 10.7*   HEMATOCRIT % 27.1* 28.1* 30.8*   PLATELETS 10*3/mm3 146 137* 126*             Lab Results   Lab Value Date/Time    TROPONINT 0.028 11/30/2022 1140               Invalid input(s): PROT, LABALBU          Glucose   Date/Time Value Ref Range Status   12/11/2022 1122 122 70 - 130 mg/dL Final     Comment:     Meter: [id number] : [id number] Mauro Fountain CNA   12/11/2022 0603 113 70 - 130 mg/dL Final     Comment:     Meter: [id number] : [id number] Latonya PLATT   12/10/2022 2338 108 70 - 130 mg/dL Final     Comment:     Meter: [id number] : [id number] Latonya Saez NA   12/10/2022 1631 112 70 - 130 mg/dL Final     Comment:     Meter: [id number] : [id number] Beni PLATT   12/10/2022 1120 108 70 - 130 mg/dL Final     Comment:     Meter: [id number] : [id number] Beni PLATT   12/10/2022 0621 122 70 - 130 mg/dL Final     Comment:     Meter:  [id number] : [id number] Latonya Saez NA   12/10/2022 0002 102 70 - 130 mg/dL Final     Comment:     Meter: [id number] : [id number] Latonya Saez NA   12/09/2022 2119 131 (H) 70 - 130 mg/dL Final     Comment:     Meter: [id number] : [id number] Latonya Saez NA           Past Medical History:   Diagnosis Date   • Allergic rhinitis    • Chest pain    • GERD (gastroesophageal reflux disease)    • Hx of migraine headaches    • Hx: UTI (urinary tract infection)    • Hypertension    • Varicose veins        Assessment:  Active Hospital Problems    Diagnosis  POA   • **Hypernatremia [E87.0]  Yes   • Altered mental status [R41.82]  Unknown   • DUTCH (acute kidney injury) (HCC) [N17.9]  Unknown   • Severe malnutrition (HCC) [E43]  Yes   • DNR (do not resuscitate) [Z66]  Yes   • Malnutrition (HCC) [E46]  Yes   • Age-related physical debility [R54]  Yes   • Alzheimer's disease (HCC) [G30.9, F02.80]  Yes   • Hypercholesterolemia [E78.00]  Yes   • BP (high blood pressure) [I10]  Yes   • OP (osteoporosis) [M81.0]  Yes      Resolved Hospital Problems   No resolved problems to display.       Plan:  [place] appears to be poor.  Family wants to continue with tube feedings and see how she does.  We will continue and follow-up lab.  Family reportedly not ready to go for palliative care yet.  [place] remains poor.  If no improvement in a day or so we need to revisit palliative care with the family.  Discussed with nurse and case management.  Morgan Mcrae MD  12/11/2022  16:19 EST

## 2022-12-11 NOTE — PLAN OF CARE
Problem: Restraint, Nonbehavioral (Nonviolent)  Goal: Absence of Harm or Injury  Outcome: Ongoing, Progressing  Intervention: Implement Least Restrictive Safety Strategies  Recent Flowsheet Documentation  Taken 12/11/2022 0400 by Desiree Mcclellan RN  Medical Device Protection:   IV pole/bag removed from visual field   torso covered  Less Restrictive Alternative:   bed alarm in use   environment adjusted   family presence promoted   calming techniques promoted   coping techniques promoted  De-Escalation Techniques:   quiet time facilitated   reoriented   stimulation decreased  Diversional Activities: television  Taken 12/11/2022 0200 by Desiree Mcclellan RN  Medical Device Protection:   IV pole/bag removed from visual field   torso covered  Less Restrictive Alternative:   bed alarm in use   environment adjusted   family presence promoted   calming techniques promoted   coping techniques promoted  De-Escalation Techniques:   quiet time facilitated   reoriented   stimulation decreased  Diversional Activities: television  Taken 12/11/2022 0000 by Desiree Mcclellan RN  Medical Device Protection:   IV pole/bag removed from visual field   torso covered  Less Restrictive Alternative:   bed alarm in use   environment adjusted   family presence promoted   calming techniques promoted   coping techniques promoted  De-Escalation Techniques:   quiet time facilitated   reoriented   stimulation decreased  Diversional Activities: television  Taken 12/10/2022 2200 by Desiree Mcclellan RN  Medical Device Protection:   IV pole/bag removed from visual field   torso covered  Less Restrictive Alternative:   bed alarm in use   environment adjusted   family presence promoted   calming techniques promoted   coping techniques promoted  De-Escalation Techniques:   quiet time facilitated   reoriented   stimulation decreased  Diversional Activities: television  Taken 12/10/2022 2000 by Desiree Mcclellan RN  Medical Device Protection:   torso covered    tubing secured   IV pole/bag removed from visual field  Less Restrictive Alternative:   calming techniques promoted   emotional support provided   family presence promoted   positive reinforcement provided   appropriate expression promoted   coping techniques promoted   environment adjusted   bed alarm in use   sensory stimulation limited   safety enhancements provided   physical activity promoted  De-Escalation Techniques:   quiet time facilitated   stimulation decreased  Diversional Activities: television  Intervention: Protect Skin and Joint Integrity  Recent Flowsheet Documentation  Taken 12/11/2022 0000 by Desiree Mcclellan RN  Body Position:   right   tilted  Taken 12/10/2022 2200 by Desiree Mcclellan RN  Body Position:   left   tilted  Taken 12/10/2022 2000 by Desiree Mcclellan RN  Body Position:   right   tilted   Goal Outcome Evaluation:           Progress: improving  Outcome Evaluation: Pt has been non verbal and does not follow commands. Cortack and tube feeds continued. Restraints to prevent pulling out Cortrack, Incontinence care. IVF. Q2. VSS. No acute distress noted.

## 2022-12-12 LAB
ALBUMIN SERPL-MCNC: 2.6 G/DL (ref 3.5–5.2)
ANION GAP SERPL CALCULATED.3IONS-SCNC: 8.1 MMOL/L (ref 5–15)
BASOPHILS # BLD AUTO: 0.03 10*3/MM3 (ref 0–0.2)
BASOPHILS NFR BLD AUTO: 0.4 % (ref 0–1.5)
BUN SERPL-MCNC: 10 MG/DL (ref 8–23)
BUN/CREAT SERPL: 31.3 (ref 7–25)
CALCIUM SPEC-SCNC: 8.7 MG/DL (ref 8.6–10.5)
CHLORIDE SERPL-SCNC: 104 MMOL/L (ref 98–107)
CO2 SERPL-SCNC: 23.9 MMOL/L (ref 22–29)
CREAT SERPL-MCNC: 0.32 MG/DL (ref 0.57–1)
DEPRECATED RDW RBC AUTO: 48.2 FL (ref 37–54)
EGFRCR SERPLBLD CKD-EPI 2021: 101.2 ML/MIN/1.73
EOSINOPHIL # BLD AUTO: 0.13 10*3/MM3 (ref 0–0.4)
EOSINOPHIL NFR BLD AUTO: 1.7 % (ref 0.3–6.2)
ERYTHROCYTE [DISTWIDTH] IN BLOOD BY AUTOMATED COUNT: 13.8 % (ref 12.3–15.4)
GLUCOSE BLDC GLUCOMTR-MCNC: 121 MG/DL (ref 70–130)
GLUCOSE BLDC GLUCOMTR-MCNC: 127 MG/DL (ref 70–130)
GLUCOSE BLDC GLUCOMTR-MCNC: 99 MG/DL (ref 70–130)
GLUCOSE SERPL-MCNC: 110 MG/DL (ref 65–99)
HCT VFR BLD AUTO: 34.1 % (ref 34–46.6)
HGB BLD-MCNC: 11.4 G/DL (ref 12–15.9)
IMM GRANULOCYTES # BLD AUTO: 0.06 10*3/MM3 (ref 0–0.05)
IMM GRANULOCYTES NFR BLD AUTO: 0.8 % (ref 0–0.5)
LYMPHOCYTES # BLD AUTO: 1.5 10*3/MM3 (ref 0.7–3.1)
LYMPHOCYTES NFR BLD AUTO: 19.2 % (ref 19.6–45.3)
MCH RBC QN AUTO: 32.1 PG (ref 26.6–33)
MCHC RBC AUTO-ENTMCNC: 33.4 G/DL (ref 31.5–35.7)
MCV RBC AUTO: 96.1 FL (ref 79–97)
MONOCYTES # BLD AUTO: 0.62 10*3/MM3 (ref 0.1–0.9)
MONOCYTES NFR BLD AUTO: 7.9 % (ref 5–12)
NEUTROPHILS NFR BLD AUTO: 5.46 10*3/MM3 (ref 1.7–7)
NEUTROPHILS NFR BLD AUTO: 70 % (ref 42.7–76)
NRBC BLD AUTO-RTO: 0 /100 WBC (ref 0–0.2)
PHOSPHATE SERPL-MCNC: 2.8 MG/DL (ref 2.5–4.5)
PHOSPHATE SERPL-MCNC: 2.9 MG/DL (ref 2.5–4.5)
PLATELET # BLD AUTO: 191 10*3/MM3 (ref 140–450)
PMV BLD AUTO: 11.8 FL (ref 6–12)
POTASSIUM SERPL-SCNC: 4.6 MMOL/L (ref 3.5–5.2)
RBC # BLD AUTO: 3.55 10*6/MM3 (ref 3.77–5.28)
SODIUM SERPL-SCNC: 136 MMOL/L (ref 136–145)
WBC NRBC COR # BLD: 7.8 10*3/MM3 (ref 3.4–10.8)

## 2022-12-12 PROCEDURE — 85025 COMPLETE CBC W/AUTO DIFF WBC: CPT | Performed by: INTERNAL MEDICINE

## 2022-12-12 PROCEDURE — 82962 GLUCOSE BLOOD TEST: CPT

## 2022-12-12 PROCEDURE — 84100 ASSAY OF PHOSPHORUS: CPT | Performed by: HOSPITALIST

## 2022-12-12 PROCEDURE — 80069 RENAL FUNCTION PANEL: CPT | Performed by: INTERNAL MEDICINE

## 2022-12-12 PROCEDURE — 97530 THERAPEUTIC ACTIVITIES: CPT | Performed by: PHYSICAL THERAPIST

## 2022-12-12 RX ADMIN — POTASSIUM PHOSPHATE, MONOBASIC AND POTASSIUM PHOSPHATE, DIBASIC 9 MMOL: 224; 236 INJECTION, SOLUTION, CONCENTRATE INTRAVENOUS at 03:09

## 2022-12-12 RX ADMIN — SODIUM CHLORIDE 50 ML/HR: 9 INJECTION, SOLUTION INTRAVENOUS at 14:12

## 2022-12-12 RX ADMIN — Medication 10 ML: at 09:53

## 2022-12-12 NOTE — PLAN OF CARE
Goal Outcome Evaluation:              Outcome Evaluation: Attempted to see patient for swallow re-evaluation; however, patient would not participate in feeding trials. She was asleep upon arrival at 1130. When awoken, patient turned head away from therapists' attempts. She currently has a Cortrak in place. ST to follow as appropriate.

## 2022-12-12 NOTE — PROGRESS NOTES
"Nutrition Services    Patient Name:  Mary Alice Sanders  YOB: 1935  MRN: [id number]  Admit Date:  11/30/2022    PROGRESS NOTE - CLINICAL NUTRITION    Comments: Follow up for TF     Encounter Information         Reason for Encounter TF f/u     Current Issues Pt remains confused, and nonverbal, in restraints. TF's at 50 (goal is 55), no residuals. Na is WNL. K and phos replacement protocol in place. RD recommends to continue to replace K and phos. Progress TFs as tolerated. Patient;s family has deferred palliative for now and wish to continue tube feeds.     Will continue to follow      Current Nutrition Orders & Evaluation of Intake       Oral Nutrition     Current PO Diet NPO Diet NPO Type: Strict NPO   Supplement n/a   PO Evaluation     % PO Intake     # of Days Evaluated     Factors Affecting Intake  altered mental status   --   Enteral Nutrition     Enteral Route NG    TF Delivery Method Continuous    Enteral Product Isosource HN    Modular     Propofol Rate/Kcal     TF Current Rate (mL) 50    TF Goal Rate (mL) 55    Current Water Flush (mL) 30ml q4 hours     Current TF Provision  1440 kcal, 65 gm protein, 970 mL free water + 180 mL water flushes         Calories 90.5% needs met         Protein  100 % needs met         TF Fluid (mL) 1150 ml          IV Fluids     Avg Volume Delivered (mL)     % Goal Volume     TF Residual  no or minimal residual    TF Changes none    TF Tolerance tolerating     Anthropometrics          Height    Weight Height: 165.1 cm (65\")  Weight: 59 kg (130 lb) (12/12/22 0514)    BMI kg/m2 Body mass index is 21.63 kg/m².    Weight trend Unknown d/t varying bed scale weights ..  Documented weights    12/01/22 0423 12/02/22 0515 12/03/22 0500 12/04/22 0515   Weight: 54.4 kg (119 lb 14.9 oz) 53.2 kg (117 lb 4.6 oz) 58.9 kg (129 lb 13.6 oz) 51.5 kg (113 lb 9.6 oz)    12/05/22 0700 12/06/22 0552 12/07/22 0506 12/07/22 0635   Weight: 56.4 kg (124 lb 6.4 oz) 59.1 kg (130 lb 3.2 oz) 56.5 " kg (124 lb 9.6 oz) 60 kg (132 lb 3.2 oz)    12/08/22 0535 12/09/22 0600 12/10/22 0628 12/11/22 0503   Weight: 60.5 kg (133 lb 4.8 oz) 60.3 kg (133 lb) 59.3 kg (130 lb 12.8 oz) 59.4 kg (131 lb)    12/12/22 0514   Weight: 59 kg (130 lb)          Labs        Pertinent Labs Reviewed, listed below     Results from last 7 days   Lab Units 12/12/22 0411 12/11/22  0406 12/10/22  0416   SODIUM mmol/L 136 137 134*   POTASSIUM mmol/L 4.6 3.6 3.9   CHLORIDE mmol/L 104 108* 106   CO2 mmol/L 23.9 22.0 22.7   BUN mg/dL 10 8 8   CREATININE mg/dL 0.32* 0.25* 0.31*   CALCIUM mg/dL 8.7 7.8* 7.7*   GLUCOSE mg/dL 110* 106* 103*     Results from last 7 days   Lab Units 12/12/22  0411 12/07/22  1300 12/07/22  0406   MAGNESIUM mg/dL  --   --  2.0   PHOSPHORUS mg/dL 2.8   < > 2.0*   HEMOGLOBIN g/dL 11.4*   < > 10.2*   HEMATOCRIT % 34.1   < > 29.9*   WBC 10*3/mm3 7.80   < > 4.52   ALBUMIN g/dL 2.60*   < > 2.40*    < > = values in this interval not displayed.     Results from last 7 days   Lab Units 12/12/22 0411 12/11/22  0406 12/10/22  0416 12/09/22  0401 12/08/22  0434   PLATELETS 10*3/mm3 191 146 137* 126* 108*     COVID19   Date Value Ref Range Status   11/30/2022 Detected (C) Not Detected - Ref. Range Final     Lab Results   Component Value Date    HGBA1C 5.20 03/05/2020          Medications            Scheduled Medications insulin regular, 0-7 Units, Subcutaneous, Q6H  potassium phosphate, 15 mmol, Intravenous, Once  sodium chloride, 10 mL, Intravenous, Q12H        Infusions sodium chloride, 50 mL/hr, Last Rate: 50 mL/hr (12/11/22 1647)        PRN Medications •  acetaminophen **OR** acetaminophen  •  dextrose  •  dextrose  •  glucagon (human recombinant)  •  ipratropium-albuterol  •  ondansetron **OR** ondansetron  •  potassium chloride  •  potassium phosphate infusion greater than 15 mMoles **OR** potassium phosphate **OR** potassium phosphate  •  sodium chloride  •  sodium chloride     Physical Findings          Physical Appearance  confused, disoriented, Other: in restrains, nonverbal   Oral/Mouth Cavity teeth missing   Edema  no edema   Gastrointestinal last bowel movement:12/10   Skin  pressure injury DTI thoracic and sacral spine    Tubes/Drains NG tube   NFPE Date Completed: 12/1   --  NUTRITION INTERVENTION / PLAN OF CARE  Intervention Goal         Intervention Goal(s) Nutrition support treatment, Reduce/improve symptoms, Meet estimated needs, Disease management/therapy, Tolerate TF/PN at goal and Maintain weight     Nutrition Intervention         RD Action Follow Tx Progress and Care plan reviewed     Nutrition Prescription         Diet Prescription     Supplement Prescription n/a   EN/PN Prescription    New Prescription Ordered? Continue same per protocol   --   Enteral Prescription:     Enteral Route NG    TF Delivery Method Continuous    Enteral Product Isosource HN    Modular     Propofol Rate/Kcal     TF Start Rate (mL/hr) 10    TF Goal Rate (mL/hr) 55    Free Water Flush (mL) 30ml q4 hours     TF Provision at Goal: 1584 kcal, 72 gm protein, 1066 mL free water + 180 mL water flushes         Calories 100 % needs met         Protein  100 % needs met         Fluid (mL) 1066 mL total     Prescription Ordered Continue same per protocol     Monitor/Evaluation        Monitor Per protocol, Pertinent labs, EN delivery/tolerance, Weight, Skin status, GI status, Symptoms, POC/GOC, Swallow function   Discharge Needs Pending clinical course   Education Education not appropriate at this time   --    RD to follow up per protocol.    Electronically signed by:  Mariah Darnell RD  12/12/22 11:57 EST

## 2022-12-12 NOTE — THERAPY PROGRESS REPORT/RE-CERT
Patient Name: Mary Alice Sanders  : 1935    MRN: [id number]                              Today's Date: 2022       Admit Date: 2022    Visit Dx:     ICD-10-CM ICD-9-CM   1. Hypernatremia  E87.0 276.0   2. Altered mental status, unspecified altered mental status type  R41.82 780.97   3. DUTCH (acute kidney injury) (HCC)  N17.9 584.9   4. Lactic acid acidosis  E87.20 276.2     Patient Active Problem List   Diagnosis   • Blues   • Acid reflux   • Blood glucose elevated   • Hypercholesterolemia   • BP (high blood pressure)   • OP (osteoporosis)   • Allergic rhinitis   • Alzheimer's disease (HCC)   • Vitamin B 12 deficiency   • Cystitis   • Bronchitis   • Vitamin D toxicity   • Complicated UTI (urinary tract infection)   • Hypertension   • Decreased responsiveness   • DNR (do not resuscitate)   • Vasovagal episode   • Malnutrition (HCC)   • Age-related physical debility   • Hypernatremia   • Severe malnutrition (HCC)   • Altered mental status   • DUTCH (acute kidney injury) (HCC)     Past Medical History:   Diagnosis Date   • Allergic rhinitis    • Chest pain    • GERD (gastroesophageal reflux disease)    • Hx of migraine headaches    • Hx: UTI (urinary tract infection)    • Hypertension    • Varicose veins      Past Surgical History:   Procedure Laterality Date   •  SECTION        General Information     Row Name 22 1518          Physical Therapy Time and Intention    Document Type progress note/recertification  -EL     Mode of Treatment individual therapy;physical therapy  -EL           User Key  (r) = Recorded By, (t) = Taken By, (c) = Cosigned By    Initials Name Provider Type    Orin Lewis PT Physical Therapist               Mobility     Row Name 22 1519          Bed Mobility    Bed Mobility rolling right  -EL     Rolling Right [place] (Bed Mobility) dependent (less than 25% patient effort);1 person to manage equipment;1 person assist  -EL     Supine-Sit [place] (Bed  Mobility) verbal cues;nonverbal cues (demo/gesture);maximum assist (25% patient effort);1 person to manage equipment;1 person assist  -EL     Sit-Supine [place] (Bed Mobility) verbal cues;nonverbal cues (demo/gesture);maximum assist (25% patient effort);1 person to manage equipment;1 person assist  -EL     Assistive Device (Bed Mobility) bed rails  -EL           User Key  (r) = Recorded By, (t) = Taken By, (c) = Cosigned By    Initials Name Provider Type    Orin Lewis, PT Physical Therapist               Obj/Interventions     Row Name 12/12/22 1520          Range of Motion Comprehensive    Comment, General Range of Motion Patient appears to be able to move LEs through range of motion but requires significant encouragement/asssitance  -     Row Name 12/12/22 1520          Strength Comprehensive (MMT)    Comment, General Manual Muscle Testing (MMT) Assessment Patient does not follow commands to assess MMTs  -EL           User Key  (r) = Recorded By, (t) = Taken By, (c) = Cosigned By    Initials Name Provider Type    Orin Lewis, PT Physical Therapist               Goals/Plan     Row Name 12/12/22 1524          Bed Mobility Goal 1 (PT)    Activity/Assistive Device (Bed Mobility Goal 1, PT) bed mobility activities, all  -EL     [place] Level/Cues Needed (Bed Mobility Goal 1, PT) minimum assist (75% or more patient effort)  -EL     Time Frame (Bed Mobility Goal 1, PT) 1 week  -EL     Progress/Outcomes (Bed Mobility Goal 1, PT) goal revised this date  -     Row Name 12/12/22 1524          Transfer Goal 1 (PT)    Activity/Assistive Device (Transfer Goal 1, PT) sit-to-stand/stand-to-sit;walker, rolling  -EL     [place] Level/Cues Needed (Transfer Goal 1, PT) minimum assist (75% or more patient effort)  -EL     Time Frame (Transfer Goal 1, PT) 1 week  -EL     Progress/Outcome (Transfer Goal 1, PT) goal revised this date  -     Row Name 12/12/22 1524          Gait Training Goal 1 (PT)     Activity/Assistive Device (Gait Training Goal 1, PT) gait (walking locomotion);walker, rolling  -EL     [place] Level (Gait Training Goal 1, PT) minimum assist (75% or more patient effort)  -EL     Distance (Gait Training Goal 1, PT) 15  -EL     Time Frame (Gait Training Goal 1, PT) 1 week  -EL     Progress/Outcome (Gait Training Goal 1, PT) goal revised this date  -EL           User Key  (r) = Recorded By, (t) = Taken By, (c) = Cosigned By    Initials Name Provider Type    Orin Lewis, PT Physical Therapist               Clinical Impression     Row Name 12/12/22 1521          Pain    Pretreatment Pain Rating 0/10 - no pain  shakes head no when inquired about pain  -EL     Row Name 12/12/22 1521          Plan of Care Review    Plan of Care Reviewed With patient  -EL     Progress no change  -EL     Outcome Evaluation The patient continues to be resistant with rehab efforts. She required total assistance for bed mobility and transitioning from supine <> sit. She would likely benefit from continued skilled physical therapy at a reduced frequency to return to previous level of function. Inpatient rehab/SNF may be beneficial at discharge, but will continue to monitor patient's functional progress.  -EL     Row Name 12/12/22 1521          Therapy Assessment/Plan (PT)    Rehab Potential (PT) fair, will monitor progress closely  -EL     Criteria for Skilled Interventions Met (PT) skilled treatment is necessary;yes;meets criteria  -EL     Therapy Frequency (PT) 3 times/wk  -EL     Row Name 12/12/22 1521          Positioning and Restraints    Pre-Treatment Position in bed  -EL     Post Treatment Position bed  -EL     In Bed supine;exit alarm on  -EL     Restraints reapplied:;soft limb  -EL           User Key  (r) = Recorded By, (t) = Taken By, (c) = Cosigned By    Initials Name Provider Type    Orin Lewis, RIGO Physical Therapist               Outcome Measures     Row Name 12/12/22 1525 12/12/22 0938        How much help from another person do you currently need...    Turning from your back to your side while in flat bed without using bedrails? 2  -EL 1  -JL    Moving from lying on back to sitting on the side of a flat bed without bedrails? 1  -EL 1  -JL    Moving to and from a bed to a chair (including a wheelchair)? 1  -EL 1  -JL    Standing up from a chair using your arms (e.g., wheelchair, bedside chair)? 1  -EL 1  -JL    Climbing 3-5 steps with a railing? 1  -EL 1  -JL    To walk in hospital room? 1  -EL 1  -JL    AM-PAC 6 Clicks Score (PT) 7  -EL 6  -JL    Highest level of mobility 2 --> Bed activities/dependent transfer  -EL 2 --> Bed activities/dependent transfer  -JL    Row Name 12/12/22 1525          Functional Assessment    Outcome Measure Options AM-PAC 6 Clicks Basic Mobility (PT)  -EL           User Key  (r) = Recorded By, (t) = Taken By, (c) = Cosigned By    Initials Name Provider Type    Eduin Marley, RN Registered Nurse    Orin Lewis, PT Physical Therapist                             Physical Therapy Education     Title: PT OT SLP Therapies (In Progress)     Topic: Physical Therapy (In Progress)     Point: Mobility training (In Progress)     Learning Progress Summary           Patient Nonacceptance, E, NR by  at 12/12/2022 1526    Acceptance, E,D, NL,NR by  at 12/7/2022 1545    Acceptance, E,TB,D, VU,NR by  at 12/5/2022 1532                   Point: Home exercise program (In Progress)     Learning Progress Summary           Patient Acceptance, E,D, NL,NR by  at 12/7/2022 1545    Acceptance, E,TB,D, VU,NR by  at 12/5/2022 1532                   Point: Body mechanics (In Progress)     Learning Progress Summary           Patient Acceptance, E,D, NL,NR by  at 12/7/2022 1545    Acceptance, E,TB,D, VU,NR by  at 12/5/2022 1532                   Point: Precautions (Done)     Learning Progress Summary           Patient Acceptance, E,TB,D, VU,NR by  at 12/5/2022 1532                                User Key     Initials Effective Dates Name Provider Type Discipline     06/16/21 -  Cheyenne Devries, PT Physical Therapist PT    EB 06/16/21 -  Rhonda Scott PTA Physical Therapist Assistant PT    EL 11/16/21 -  Orin Spain PT Physical Therapist PT              PT Recommendation and Plan     Plan of Care Reviewed With: patient  Progress: no change  Outcome Evaluation: The patient continues to be resistant with rehab efforts. She required total assistance for bed mobility and transitioning from supine <> sit. She would likely benefit from continued skilled physical therapy at a reduced frequency to return to previous level of function. Inpatient rehab/SNF may be beneficial at discharge, but will continue to monitor patient's functional progress.     Time Calculation:    PT Charges     Row Name 12/12/22 1526             Time Calculation    Start Time 1440  -EL      Stop Time 1456  -EL      Time Calculation (min) 16 min  -EL      PT Received On 12/12/22  -EL      PT - Next Appointment 12/14/22  -EL      PT Goal Re-Cert Due Date 12/19/22  -EL         Time Calculation- PT    Total Timed Code Minutes- PT 16 minute(s)  -EL            User Key  (r) = Recorded By, (t) = Taken By, (c) = Cosigned By    Initials Name Provider Type    EL Orin Spain, RIGO Physical Therapist              Therapy Charges for Today     Code Description Service Date Service Provider Modifiers Qty    [id number]  PT THERAPEUTIC ACT EA 15 MIN 12/12/2022 Orin Spain, PT GP 1          PT G-Codes  Outcome Measure Options: AM-PAC 6 Clicks Basic Mobility (PT)  AM-PAC 6 Clicks Score (PT): 7  AM-PAC 6 Clicks Score (OT): 6  PT Discharge Summary  Anticipated Discharge Disposition (PT): home with 24/7 care, skilled nursing facility    Orin Spain PT  12/12/2022

## 2022-12-12 NOTE — PROGRESS NOTES
"DAILY PROGRESS NOTE  [facility]    Patient Identification:  Name: Mary Alice Sanders  Age: 87 y.o.  Sex: female  :  1935  MRN: [id number]         Primary Care Physician: Gera Barker MD    Subjective:  Interval History: The patient is confused and not talking.  Objective:    Scheduled Meds:insulin regular, 0-7 Units, Subcutaneous, Q6H  potassium phosphate, 15 mmol, Intravenous, Once  sodium chloride, 10 mL, Intravenous, Q12H      Continuous Infusions:sodium chloride, 50 mL/hr, Last Rate: 50 mL/hr (22 1647)        Vital signs in last 24 hours:  Temp:  [97.9 °F (36.6 °C)-98.4 °F (36.9 °C)] 97.9 °F (36.6 °C)  Heart Rate:  [] 86  Resp:  [18] 18  BP: ()/(67-91) 90/67    Intake/Output:    Intake/Output Summary (Last 24 hours) at 2022 1312  Last data filed at 2022 0844  Gross per 24 hour   Intake --   Output 1550 ml   Net -1550 ml       Exam:  BP 90/67 (BP Location: Left arm, Patient Position: Lying)   Pulse 86   Temp 97.9 °F (36.6 °C) (Oral)   Resp 18   Ht 165.1 cm (65\")   Wt 59 kg (130 lb)   SpO2 95%   BMI 21.63 kg/m²     General Appearance:   Confused, no distress   Head:    Normocephalic, without obvious abnormality, atraumatic   Eyes:       Throat:   Lips, tongue, gums normal   Neck:   Supple, symmetrical, trachea midline, no JVD   Lungs:     Clear to auscultation bilaterally, respirations unlabored   Chest Wall:    No tenderness or deformity    Heart:    Regular rate and rhythm, S1 and S2 normal, no murmur,no  Rub or gallop   Abdomen:     Soft, nontender, bowel sounds active, no masses, no organomegaly    Extremities:   Extremities normal, atraumatic, no cyanosis or edema   Pulses:      Skin:   Skin is warm and dry,  no rashes or palpable lesions   Neurologic:   no focal deficits noted, confused and demented.  Patient is not  talking.      Lab Results (last 72 hours)     Procedure Component Value Units Date/Time    Magnesium [[id number]]  (Normal) " Collected: 12/07/22 0406    Specimen: Blood Updated: 12/07/22 1131     Magnesium 2.0 mg/dL     POC Glucose Once [[id number]]  (Normal) Collected: 12/07/22 1104    Specimen: Blood Updated: 12/07/22 1105     Glucose 118 mg/dL      Comment: Meter: [id number] : [id number] Charlotte Damian NA       POC Glucose Once [[id number]]  (Normal) Collected: 12/07/22 0633    Specimen: Blood Updated: 12/07/22 0634     Glucose 98 mg/dL      Comment: Meter: [id number] : [id number] Osorio PLATT       Renal Function Panel [[id number]]  (Abnormal) Collected: 12/07/22 0406    Specimen: Blood Updated: 12/07/22 0447     Glucose 88 mg/dL      BUN 9 mg/dL      Creatinine 0.40 mg/dL      Sodium 142 mmol/L      Potassium 3.1 mmol/L      Chloride 112 mmol/L      CO2 18.1 mmol/L      Calcium 7.6 mg/dL      Albumin 2.40 g/dL      Phosphorus 2.0 mg/dL      Anion Gap 11.9 mmol/L      BUN/Creatinine Ratio 22.5     eGFR 95.9 mL/min/1.73      Comment: National Kidney Foundation and American Society of Nephrology (ASN) Task Force recommended calculation based on the Chronic Kidney Disease Epidemiology Collaboration (CKD-EPI) equation refit without adjustment for race.       Narrative:      GFR Normal >60  Chronic Kidney Disease <60  Kidney Failure <15    The GFR formula is only valid for adults with stable renal function between ages 18 and 70.    CBC & Differential [[id number]]  (Abnormal) Collected: 12/07/22 0406    Specimen: Blood Updated: 12/07/22 0440    Narrative:      The following orders were created for panel order CBC & Differential.  Procedure                               Abnormality         Status                     ---------                               -----------         ------                     CBC Auto Differential[[id number]]        Abnormal            Final result                 Please view results for these tests on the individual orders.    CBC Auto Differential [[id number]]  (Abnormal) Collected: 12/07/22 0406     Specimen: Blood Updated: 12/07/22 0440     WBC 4.52 10*3/mm3      RBC 3.15 10*6/mm3      Hemoglobin 10.2 g/dL      Hematocrit 29.9 %      MCV 94.9 fL      MCH 32.4 pg      MCHC 34.1 g/dL      RDW 13.6 %      RDW-SD 45.7 fl      MPV 13.3 fL      Platelets 93 10*3/mm3      Neutrophil % 62.4 %      Lymphocyte % 23.7 %      Monocyte % 10.4 %      Eosinophil % 1.5 %      Basophil % 0.2 %      Neutrophils, Absolute 2.82 10*3/mm3      Lymphocytes, Absolute 1.07 10*3/mm3      Monocytes, Absolute 0.47 10*3/mm3      Eosinophils, Absolute 0.07 10*3/mm3      Basophils, Absolute 0.01 10*3/mm3     Phosphorus [[id number]]  (Abnormal) Collected: 12/06/22 2300    Specimen: Blood Updated: 12/06/22 2333     Phosphorus 2.2 mg/dL     POC Glucose Once [[id number]]  (Normal) Collected: 12/06/22 2319    Specimen: Blood Updated: 12/06/22 2320     Glucose 96 mg/dL      Comment: Meter: [id number] : [id number] Osorio Kaye NA       POC Glucose Once [[id number]]  (Normal) Collected: 12/06/22 2014    Specimen: Blood Updated: 12/06/22 2016     Glucose 96 mg/dL      Comment: Meter: [id number] : [id number] Osorio Kaye NA       POC Glucose Once [[id number]]  (Normal) Collected: 12/06/22 1611    Specimen: Blood Updated: 12/06/22 1612     Glucose 88 mg/dL      Comment: Meter: [id number] : [id number] Aaerike Rickie NA       DESHAWN + PE [[id number]]  (Abnormal) Collected: 12/04/22 0941    Specimen: Blood Updated: 12/06/22 1414     IgG 593 mg/dL      IgA 161 mg/dL      IgM 159 mg/dL      Total Protein 4.8 g/dL      Albumin 2.5 g/dL      Alpha-1-Globulin 0.3 g/dL      Alpha-2-Globulin 0.7 g/dL      Beta Globulin 0.7 g/dL      Gamma Globulin 0.5 g/dL      M-Wing Not Observed g/dL      Globulin 2.3 g/dL      A/G Ratio 1.1     Immunofixation Reflex, Serum Comment     Comment: No monoclonality detected.        Please note Comment     Comment: Protein electrophoresis scan will follow via computer, mail, or   delivery.       Narrative:       Performed at:  [address] [place] [address]  [id number]  : Payam Monteiro PhD, Phone:  [phone]    POC Glucose Once [[id number]]  (Normal) Collected: 12/06/22 1100    Specimen: Blood Updated: 12/06/22 1101     Glucose 102 mg/dL      Comment: Meter: [id number] : [id number] Charlotte PLATT       CBC & Differential [[id number]]  (Abnormal) Collected: 12/06/22 0358    Specimen: Blood Updated: 12/06/22 0654    Narrative:      The following orders were created for panel order CBC & Differential.  Procedure                               Abnormality         Status                     ---------                               -----------         ------                     CBC Auto Differential[[id number]]        Abnormal            Final result                 Please view results for these tests on the individual orders.    CBC Auto Differential [[id number]]  (Abnormal) Collected: 12/06/22 0358    Specimen: Blood Updated: 12/06/22 0654     WBC 6.21 10*3/mm3      RBC 3.43 10*6/mm3      Hemoglobin 11.1 g/dL      Hematocrit 31.0 %      MCV 90.4 fL      MCH 32.4 pg      MCHC 35.8 g/dL      RDW 13.0 %      RDW-SD 42.2 fl      MPV 14.3 fL      Platelets 81 10*3/mm3      Neutrophil % 70.1 %      Lymphocyte % 18.4 %      Monocyte % 8.9 %      Eosinophil % 0.8 %      Basophil % 0.2 %      Neutrophils, Absolute 4.36 10*3/mm3      Lymphocytes, Absolute 1.14 10*3/mm3      Monocytes, Absolute 0.55 10*3/mm3      Eosinophils, Absolute 0.05 10*3/mm3      Basophils, Absolute 0.01 10*3/mm3     Renal Function Panel [[id number]]  (Abnormal) Collected: 12/06/22 0358    Specimen: Blood Updated: 12/06/22 0600     Glucose 89 mg/dL      BUN 7 mg/dL      Creatinine 0.48 mg/dL      Sodium 137 mmol/L      Potassium 3.5 mmol/L      Chloride 110 mmol/L      CO2 19.0 mmol/L      Calcium 7.6 mg/dL      Albumin 2.60 g/dL      Phosphorus 1.8 mg/dL      Anion Gap 8.0 mmol/L      BUN/Creatinine Ratio 14.6     eGFR 91.8  mL/min/1.73      Comment: National Kidney Foundation and American Society of Nephrology (ASN) Task Force recommended calculation based on the Chronic Kidney Disease Epidemiology Collaboration (CKD-EPI) equation refit without adjustment for race.       Narrative:      GFR Normal >60  Chronic Kidney Disease <60  Kidney Failure <15    The GFR formula is only valid for adults with stable renal function between ages 18 and 70.    POC Glucose Once [[id number]]  (Normal) Collected: 12/06/22 0533    Specimen: Blood Updated: 12/06/22 0534     Glucose 95 mg/dL      Comment: Meter: [id number] : [id number] Osorio Kaye NA       POC Glucose Once [[id number]]  (Normal) Collected: 12/06/22 0008    Specimen: Blood Updated: 12/06/22 0009     Glucose 113 mg/dL      Comment: Meter: [id number] : [id number] Osorio Kaye NA       POC Glucose Once [[id number]]  (Normal) Collected: 12/05/22 2006    Specimen: Blood Updated: 12/05/22 2007     Glucose 101 mg/dL      Comment: Meter: [id number] : [id number] Osorio Kaye NA       POC Glucose Once [[id number]]  (Normal) Collected: 12/05/22 1607    Specimen: Blood Updated: 12/05/22 1609     Glucose 100 mg/dL      Comment: Meter: [id number] : [id number] Acquiaey PCA       Blood Culture - Blood, Arm, Left [[id number]]  (Normal) Collected: 11/30/22 1237    Specimen: Blood from Arm, Left Updated: 12/05/22 1315     Blood Culture No growth at 5 days    Blood Culture - Blood, Arm, Right [[id number]]  (Normal) Collected: 11/30/22 1140    Specimen: Blood from Arm, Right Updated: 12/05/22 1200     Blood Culture No growth at 5 days    POC Glucose Once [[id number]]  (Normal) Collected: 12/05/22 1115    Specimen: Blood Updated: 12/05/22 1117     Glucose 125 mg/dL      Comment: Meter: [id number] : [id number] Acquiaey PCA       POC Glucose Once [[id number]]  (Normal) Collected: 12/05/22 0706    Specimen: Blood Updated: 12/05/22 0707     Glucose 114 mg/dL      Comment: Meter:  [id number] : [id number] Finesse PLATT       Renal Function Panel [[id number]]  (Abnormal) Collected: 12/05/22 0359    Specimen: Blood Updated: 12/05/22 0458     Glucose 99 mg/dL      BUN 5 mg/dL      Creatinine 0.52 mg/dL      Sodium 137 mmol/L      Potassium 2.8 mmol/L      Chloride 105 mmol/L      CO2 20.9 mmol/L      Calcium 7.4 mg/dL      Albumin 2.70 g/dL      Phosphorus 1.8 mg/dL      Anion Gap 11.1 mmol/L      BUN/Creatinine Ratio 9.6     eGFR 90.1 mL/min/1.73      Comment: National Kidney Foundation and American Society of Nephrology (ASN) Task Force recommended calculation based on the Chronic Kidney Disease Epidemiology Collaboration (CKD-EPI) equation refit without adjustment for race.       Narrative:      GFR Normal >60  Chronic Kidney Disease <60  Kidney Failure <15    The GFR formula is only valid for adults with stable renal function between ages 18 and 70.    CBC & Differential [[id number]]  (Abnormal) Collected: 12/05/22 0359    Specimen: Blood Updated: 12/05/22 0450    Narrative:      The following orders were created for panel order CBC & Differential.  Procedure                               Abnormality         Status                     ---------                               -----------         ------                     CBC Auto Differential[[id number]]        Abnormal            Final result                 Please view results for these tests on the individual orders.    CBC Auto Differential [[id number]]  (Abnormal) Collected: 12/05/22 0359    Specimen: Blood Updated: 12/05/22 0450     WBC 6.22 10*3/mm3      RBC 3.61 10*6/mm3      Hemoglobin 11.6 g/dL      Hematocrit 34.1 %      MCV 94.5 fL      MCH 32.1 pg      MCHC 34.0 g/dL      RDW 13.1 %      RDW-SD 44.6 fl      MPV 14.3 fL      Platelets 66 10*3/mm3      Neutrophil % 68.2 %      Lymphocyte % 20.1 %      Monocyte % 8.5 %      Eosinophil % 1.1 %      Basophil % 0.3 %      Neutrophils, Absolute 4.24 10*3/mm3      Lymphocytes, Absolute  1.25 10*3/mm3      Monocytes, Absolute 0.53 10*3/mm3      Eosinophils, Absolute 0.07 10*3/mm3      Basophils, Absolute 0.02 10*3/mm3     POC Glucose Once [[id number]]  (Normal) Collected: 12/04/22 2119    Specimen: Blood Updated: 12/04/22 2120     Glucose 108 mg/dL      Comment: Meter: [id number] : [id number] Finesse Ott NA       POC Glucose Once [[id number]]  (Normal) Collected: 12/04/22 1614    Specimen: Blood Updated: 12/04/22 1617     Glucose 122 mg/dL      Comment: Meter: [id number] : [id number] Lloyd Meraz CNA           Data Review:  Results from last 7 days   Lab Units 12/12/22  0411 12/11/22  0406 12/10/22  0416   SODIUM mmol/L 136 137 134*   POTASSIUM mmol/L 4.6 3.6 3.9   CHLORIDE mmol/L 104 108* 106   CO2 mmol/L 23.9 22.0 22.7   BUN mg/dL 10 8 8   CREATININE mg/dL 0.32* 0.25* 0.31*   GLUCOSE mg/dL 110* 106* 103*   CALCIUM mg/dL 8.7 7.8* 7.7*     Results from last 7 days   Lab Units 12/12/22  0411 12/11/22  0406 12/10/22  0416   WBC 10*3/mm3 7.80 5.00 5.49   HEMOGLOBIN g/dL 11.4* 9.3* 10.0*   HEMATOCRIT % 34.1 27.1* 28.1*   PLATELETS 10*3/mm3 191 146 137*             Lab Results   Lab Value Date/Time    TROPONINT 0.028 11/30/2022 1140               Invalid input(s): PROT, LABALBU          Glucose   Date/Time Value Ref Range Status   12/12/2022 1059 127 70 - 130 mg/dL Final     Comment:     Meter: [id number] : [id number] Yohan Lee NA   12/12/2022 0606 99 70 - 130 mg/dL Final     Comment:     Meter: [id number] : [id number] Hanks Tia NA   12/12/2022 0006 121 70 - 130 mg/dL Final     Comment:     Meter: [id number] : [id number] Latonya PLATT   12/11/2022 1653 108 70 - 130 mg/dL Final     Comment:     Meter: [id number] : [id number] Beni PLATT   12/11/2022 1122 122 70 - 130 mg/dL Final     Comment:     Meter: [id number] : [id number] Mauro Fountain CNA   12/11/2022 0603 113 70 - 130 mg/dL Final     Comment:     Meter: [id number] : [id number] Latonya PLATT   12/10/2022 2338  108 70 - 130 mg/dL Final     Comment:     Meter: [id number] : [id number] Latonya PLATT   12/10/2022 1631 112 70 - 130 mg/dL Final     Comment:     Meter: [id number] : [id number] Beni PLATT           Past Medical History:   Diagnosis Date   • Allergic rhinitis    • Chest pain    • GERD (gastroesophageal reflux disease)    • Hx of migraine headaches    • Hx: UTI (urinary tract infection)    • Hypertension    • Varicose veins        Assessment:  Active Hospital Problems    Diagnosis  POA   • **Hypernatremia [E87.0]  Yes   • Altered mental status [R41.82]  Unknown   • DUTCH (acute kidney injury) (HCC) [N17.9]  Unknown   • Severe malnutrition (HCC) [E43]  Yes   • DNR (do not resuscitate) [Z66]  Yes   • Malnutrition (HCC) [E46]  Yes   • Age-related physical debility [R54]  Yes   • Alzheimer's disease (HCC) [G30.9, F02.80]  Yes   • Hypercholesterolemia [E78.00]  Yes   • BP (high blood pressure) [I10]  Yes   • OP (osteoporosis) [M81.0]  Yes      Resolved Hospital Problems   No resolved problems to display.       Plan:  [place] appears to be poor.  Family wants to continue with tube feedings and see how she does.  We will continue and follow-up lab.  Family reportedly not ready to go for palliative care yet.  [place] remains poor.  Hopefully another meeting with palliative care can convince family to go with comfort measures only.  Discussed with nurse and case management.  Family is not been wanting permanent feeding tube.  Can go to a nursing facility with nasogastric tube feedings.  We will continue with soft restraints for now.  Morgan Mcrae MD  12/12/2022  13:12 EST

## 2022-12-12 NOTE — PLAN OF CARE
Goal Outcome Evaluation:  Plan of Care Reviewed With: patient        Progress: no change  Outcome Evaluation: Continues to require restraints to keep patient from pulling out cortrak.  During care when restraints released patient instantly reaches towards tube in nose.  Tube feeds at goal rate and tolerating well.

## 2022-12-12 NOTE — PLAN OF CARE
Goal Outcome Evaluation:  Plan of Care Reviewed With: patient        Progress: no change  Outcome Evaluation: The patient continues to be resistant with rehab efforts. She required total assistance for bed mobility and transitioning from supine <> sit. She would likely benefit from continued skilled physical therapy at a reduced frequency to return to previous level of function. Inpatient rehab/SNF may be beneficial at discharge, but will continue to monitor patient's functional progress.

## 2022-12-13 LAB
ALBUMIN SERPL-MCNC: 2.9 G/DL (ref 3.5–5.2)
ANION GAP SERPL CALCULATED.3IONS-SCNC: 10 MMOL/L (ref 5–15)
BASOPHILS # BLD AUTO: 0.01 10*3/MM3 (ref 0–0.2)
BASOPHILS NFR BLD AUTO: 0.2 % (ref 0–1.5)
BUN SERPL-MCNC: 11 MG/DL (ref 8–23)
BUN/CREAT SERPL: 32.4 (ref 7–25)
CALCIUM SPEC-SCNC: 8.9 MG/DL (ref 8.6–10.5)
CHLORIDE SERPL-SCNC: 99 MMOL/L (ref 98–107)
CO2 SERPL-SCNC: 24 MMOL/L (ref 22–29)
CREAT SERPL-MCNC: 0.34 MG/DL (ref 0.57–1)
DEPRECATED RDW RBC AUTO: 48.4 FL (ref 37–54)
EGFRCR SERPLBLD CKD-EPI 2021: 99.8 ML/MIN/1.73
EOSINOPHIL # BLD AUTO: 0.13 10*3/MM3 (ref 0–0.4)
EOSINOPHIL NFR BLD AUTO: 2.2 % (ref 0.3–6.2)
ERYTHROCYTE [DISTWIDTH] IN BLOOD BY AUTOMATED COUNT: 14.1 % (ref 12.3–15.4)
GLUCOSE BLDC GLUCOMTR-MCNC: 101 MG/DL (ref 70–130)
GLUCOSE BLDC GLUCOMTR-MCNC: 112 MG/DL (ref 70–130)
GLUCOSE BLDC GLUCOMTR-MCNC: 120 MG/DL (ref 70–130)
GLUCOSE BLDC GLUCOMTR-MCNC: 123 MG/DL (ref 70–130)
GLUCOSE SERPL-MCNC: 97 MG/DL (ref 65–99)
HCT VFR BLD AUTO: 32.6 % (ref 34–46.6)
HGB BLD-MCNC: 11 G/DL (ref 12–15.9)
IMM GRANULOCYTES # BLD AUTO: 0.03 10*3/MM3 (ref 0–0.05)
IMM GRANULOCYTES NFR BLD AUTO: 0.5 % (ref 0–0.5)
LYMPHOCYTES # BLD AUTO: 1.54 10*3/MM3 (ref 0.7–3.1)
LYMPHOCYTES NFR BLD AUTO: 25.6 % (ref 19.6–45.3)
MCH RBC QN AUTO: 32.3 PG (ref 26.6–33)
MCHC RBC AUTO-ENTMCNC: 33.7 G/DL (ref 31.5–35.7)
MCV RBC AUTO: 95.6 FL (ref 79–97)
MONOCYTES # BLD AUTO: 0.57 10*3/MM3 (ref 0.1–0.9)
MONOCYTES NFR BLD AUTO: 9.5 % (ref 5–12)
NEUTROPHILS NFR BLD AUTO: 3.73 10*3/MM3 (ref 1.7–7)
NEUTROPHILS NFR BLD AUTO: 62 % (ref 42.7–76)
NRBC BLD AUTO-RTO: 0.2 /100 WBC (ref 0–0.2)
PHOSPHATE SERPL-MCNC: 3.1 MG/DL (ref 2.5–4.5)
PLATELET # BLD AUTO: 208 10*3/MM3 (ref 140–450)
PMV BLD AUTO: 11.4 FL (ref 6–12)
POTASSIUM SERPL-SCNC: 4.3 MMOL/L (ref 3.5–5.2)
RBC # BLD AUTO: 3.41 10*6/MM3 (ref 3.77–5.28)
SODIUM SERPL-SCNC: 133 MMOL/L (ref 136–145)
WBC NRBC COR # BLD: 6.01 10*3/MM3 (ref 3.4–10.8)

## 2022-12-13 PROCEDURE — 82962 GLUCOSE BLOOD TEST: CPT

## 2022-12-13 PROCEDURE — 85025 COMPLETE CBC W/AUTO DIFF WBC: CPT | Performed by: INTERNAL MEDICINE

## 2022-12-13 PROCEDURE — 80069 RENAL FUNCTION PANEL: CPT | Performed by: INTERNAL MEDICINE

## 2022-12-13 RX ADMIN — Medication 10 ML: at 20:45

## 2022-12-13 RX ADMIN — Medication 10 ML: at 10:26

## 2022-12-13 NOTE — PLAN OF CARE
Goal Outcome Evaluation:     Problem: Fall Injury Risk  Goal: Absence of Fall and Fall-Related Injury  Intervention: Identify and Manage Contributors  Recent Flowsheet Documentation  Taken 12/13/2022 1446 by Maribell Ch RN  Medication Review/Management: medications reviewed  Taken 12/13/2022 1211 by Maribell Ch RN  Medication Review/Management: medications reviewed  Taken 12/13/2022 1000 by Maribell Ch RN  Medication Review/Management: medications reviewed  Taken 12/13/2022 0805 by Maribell Ch RN  Medication Review/Management: medications reviewed     Problem: Fall Injury Risk  Goal: Absence of Fall and Fall-Related Injury  Intervention: Promote Injury-Free Environment  Recent Flowsheet Documentation  Taken 12/13/2022 1446 by Maribell Ch RN  Safety Promotion/Fall Prevention:   activity supervised   room organization consistent   safety round/check completed  Taken 12/13/2022 1211 by Maribell Ch RN  Safety Promotion/Fall Prevention:   activity supervised   room organization consistent   safety round/check completed  Taken 12/13/2022 1000 by Maribell Ch RN  Safety Promotion/Fall Prevention:   activity supervised   room organization consistent   safety round/check completed  Taken 12/13/2022 0805 by Maribell Ch RN  Safety Promotion/Fall Prevention: activity supervised      VSS. Slept between care. NG tube with tube feedings in place. In restraints. Q2 turn. Hospice consult place. Care plan carefully updated.

## 2022-12-13 NOTE — PLAN OF CARE
Goal Outcome Evaluation:         Pt continues to require restraints. Continues on tube feeds, tolerating well. Incontinence care as needed, Q2 turns. Continues on IVF. Awaiting am labs. Will continue to monitor.

## 2022-12-13 NOTE — CONSULTS
Purpose of the visit was to evaluate for: goals of care/advanced care planning, support for patient/family, hospice referral/discussion and comfort care. Spoke with MD, RN and CCP as well as patient and family and discussed palliative care, goals of care and [facility].      Assessment:  Patient is palliative care appropriate given dementia, COVID-19, severe malnutrition. Patient with cortrak and restraints to keep from pulling it out, pt lethargic would not open eyes upon assessment. PPS: 30%.     Recommendations/Plan: Goal is to go home with hospice with son as primary caregiver. Son would like hospice consult to assist with d/c home. Plan to continue current level of care including keep cortrak until time of d/c. Son noted he will need assistance with getting patient to 2nd floor bedroom.     Other Comments:   Spoke to son via phone. He voiced understanding of patient's declining condition and overall prognosis. He reported that he knows the he and patient would not want a permanent feeding tube. He sees that temp tube feeds has not changed her outlook or quality of life. He wants to make sure that he can get her home for end of life care. He is familiar with hospice services as he was the primary caregiver for patient's brother when he . Provided support to son and education about palliative/hospice care as well as comfort care. He wants to work on transition home with hospice. Advised of palliative team availability and contact information if other needs arise. Notified RN, CCP, and MD of discussion.

## 2022-12-13 NOTE — PROGRESS NOTES
[facility]     LOS: 13 days     Subjective:    CC:Weakness - Generalized and Altered Mental Status    DIET:  Diet Order   Procedures   • NPO Diet NPO Type: Strict NPO     Patient is confused and not talking    Objective:    Vital Signs:  Temp:  [97 °F (36.1 °C)-98.7 °F (37.1 °C)] 97.6 °F (36.4 °C)  Heart Rate:  [88-98] 94  Resp:  [16-18] 18  BP: ()/(70-83) 103/83    SpO2:  [95 %-96 %] 96 %  on   ;   Device (Oxygen Therapy): room air  Body mass index is 21.87 kg/m².    Physical Exam  Constitutional:       Comments: Frail appearing   HENT:      Head: Normocephalic and atraumatic.   Cardiovascular:      Rate and Rhythm: Normal rate and regular rhythm.      Heart sounds: No murmur heard.    No friction rub.   Pulmonary:      Effort: Pulmonary effort is normal.      Breath sounds: Normal breath sounds.   Abdominal:      General: Bowel sounds are normal. There is no distension.      Palpations: Abdomen is soft.      Tenderness: There is no abdominal tenderness.   Skin:     General: Skin is warm and dry.   Neurological:      Mental Status: She is alert.      Comments: Smiles, won't talk to me   Psychiatric:         Mood and Affect: Mood normal.         Behavior: Behavior normal.         Results Review:    Glucose   Date Value Ref Range Status   12/13/2022 97 65 - 99 mg/dL Final   12/12/2022 110 (H) 65 - 99 mg/dL Final   12/11/2022 106 (H) 65 - 99 mg/dL Final     Results from last 7 days   Lab Units 12/13/22  0422   WBC 10*3/mm3 6.01   HEMOGLOBIN g/dL 11.0*   HEMATOCRIT % 32.6*   PLATELETS 10*3/mm3 208     Results from last 7 days   Lab Units 12/13/22  0422   SODIUM mmol/L 133*   POTASSIUM mmol/L 4.3   CHLORIDE mmol/L 99   CO2 mmol/L 24.0   BUN mg/dL 11   CREATININE mg/dL 0.34*   CALCIUM mg/dL 8.9   GLUCOSE mg/dL 97         Results from last 7 days   Lab Units 12/07/22  0406   MAGNESIUM mg/dL 2.0         Cultures:  No results found for: BLOODCX, URINECX, WOUNDCX, MRSACX, RESPCX,  STOOLCX    I have reviewed daily medications and changes in CPOE    Scheduled meds  insulin regular, 0-7 Units, Subcutaneous, Q6H  potassium phosphate, 15 mmol, Intravenous, Once  sodium chloride, 10 mL, Intravenous, Q12H        sodium chloride, 50 mL/hr, Last Rate: 50 mL/hr (12/12/22 1412)      PRN meds  •  acetaminophen **OR** acetaminophen  •  dextrose  •  dextrose  •  glucagon (human recombinant)  •  ipratropium-albuterol  •  ondansetron **OR** ondansetron  •  potassium chloride  •  potassium phosphate infusion greater than 15 mMoles **OR** potassium phosphate **OR** potassium phosphate  •  sodium chloride  •  sodium chloride        Hypernatremia    Hypercholesterolemia    BP (high blood pressure)    OP (osteoporosis)    Alzheimer's disease (HCC)    DNR (do not resuscitate)    Malnutrition (HCC)    Age-related physical debility    Severe malnutrition (HCC)    Altered mental status    DUTCH (acute kidney injury) ([facility])        Assessment/Plan:    Patient was admitted to the hospital by the intensivist and we were consulted to take over care for hypernatremia, malnutrition.      Initially admitted to ICU with sodium of 180..... Now back on telemetry.  Severely dehydrated but now responding to IV fluids.  Very elderly and frail with very advanced dementia.  Malnourished.  Her son does not want a permanent G-tube but would like a trial of NG feedings to see if she perks up.     Severe hypernatremia >180 on admission  -NS at 50 cc/hr  -Patient is getting Osmolite tube feeds, increasing to a goal of 55    Acute kidney injury-creatinine is normalized    Lactic acidosis  -Has resolved    COVID-19 infection  -Patient has been in the hospital now for 13 days no further treatment indicated, the patient remains on room air    Encephalopathy        Benson Adams MD  12/13/22  17:59 EST

## 2022-12-14 LAB
ALBUMIN SERPL-MCNC: 2.7 G/DL (ref 3.5–5.2)
ANION GAP SERPL CALCULATED.3IONS-SCNC: 9.4 MMOL/L (ref 5–15)
BASOPHILS # BLD AUTO: 0.02 10*3/MM3 (ref 0–0.2)
BASOPHILS NFR BLD AUTO: 0.3 % (ref 0–1.5)
BUN SERPL-MCNC: 13 MG/DL (ref 8–23)
BUN/CREAT SERPL: 38.2 (ref 7–25)
CALCIUM SPEC-SCNC: 8.6 MG/DL (ref 8.6–10.5)
CHLORIDE SERPL-SCNC: 101 MMOL/L (ref 98–107)
CO2 SERPL-SCNC: 23.6 MMOL/L (ref 22–29)
CREAT SERPL-MCNC: 0.34 MG/DL (ref 0.57–1)
DEPRECATED RDW RBC AUTO: 48.7 FL (ref 37–54)
EGFRCR SERPLBLD CKD-EPI 2021: 99.8 ML/MIN/1.73
EOSINOPHIL # BLD AUTO: 0.11 10*3/MM3 (ref 0–0.4)
EOSINOPHIL NFR BLD AUTO: 1.8 % (ref 0.3–6.2)
ERYTHROCYTE [DISTWIDTH] IN BLOOD BY AUTOMATED COUNT: 14 % (ref 12.3–15.4)
GLUCOSE BLDC GLUCOMTR-MCNC: 109 MG/DL (ref 70–130)
GLUCOSE BLDC GLUCOMTR-MCNC: 124 MG/DL (ref 70–130)
GLUCOSE BLDC GLUCOMTR-MCNC: 125 MG/DL (ref 70–130)
GLUCOSE BLDC GLUCOMTR-MCNC: 126 MG/DL (ref 70–130)
GLUCOSE SERPL-MCNC: 109 MG/DL (ref 65–99)
HCT VFR BLD AUTO: 30.5 % (ref 34–46.6)
HGB BLD-MCNC: 10.4 G/DL (ref 12–15.9)
IMM GRANULOCYTES # BLD AUTO: 0.04 10*3/MM3 (ref 0–0.05)
IMM GRANULOCYTES NFR BLD AUTO: 0.6 % (ref 0–0.5)
LYMPHOCYTES # BLD AUTO: 1.33 10*3/MM3 (ref 0.7–3.1)
LYMPHOCYTES NFR BLD AUTO: 21.4 % (ref 19.6–45.3)
MCH RBC QN AUTO: 32.8 PG (ref 26.6–33)
MCHC RBC AUTO-ENTMCNC: 34.1 G/DL (ref 31.5–35.7)
MCV RBC AUTO: 96.2 FL (ref 79–97)
MONOCYTES # BLD AUTO: 0.67 10*3/MM3 (ref 0.1–0.9)
MONOCYTES NFR BLD AUTO: 10.8 % (ref 5–12)
NEUTROPHILS NFR BLD AUTO: 4.05 10*3/MM3 (ref 1.7–7)
NEUTROPHILS NFR BLD AUTO: 65.1 % (ref 42.7–76)
NRBC BLD AUTO-RTO: 0 /100 WBC (ref 0–0.2)
PHOSPHATE SERPL-MCNC: 2.8 MG/DL (ref 2.5–4.5)
PLATELET # BLD AUTO: 196 10*3/MM3 (ref 140–450)
PMV BLD AUTO: 11 FL (ref 6–12)
POTASSIUM SERPL-SCNC: 4.1 MMOL/L (ref 3.5–5.2)
RBC # BLD AUTO: 3.17 10*6/MM3 (ref 3.77–5.28)
SODIUM SERPL-SCNC: 134 MMOL/L (ref 136–145)
WBC NRBC COR # BLD: 6.22 10*3/MM3 (ref 3.4–10.8)

## 2022-12-14 PROCEDURE — 97530 THERAPEUTIC ACTIVITIES: CPT

## 2022-12-14 PROCEDURE — 80069 RENAL FUNCTION PANEL: CPT | Performed by: INTERNAL MEDICINE

## 2022-12-14 PROCEDURE — 82962 GLUCOSE BLOOD TEST: CPT

## 2022-12-14 PROCEDURE — 85025 COMPLETE CBC W/AUTO DIFF WBC: CPT | Performed by: INTERNAL MEDICINE

## 2022-12-14 RX ADMIN — Medication 10 ML: at 08:12

## 2022-12-14 RX ADMIN — Medication 10 ML: at 20:39

## 2022-12-14 RX ADMIN — SODIUM CHLORIDE 50 ML/HR: 9 INJECTION, SOLUTION INTRAVENOUS at 11:21

## 2022-12-14 NOTE — PLAN OF CARE
Goal Outcome Evaluation:  Plan of Care Reviewed With: patient        Progress: no change  Outcome Evaluation: Pt seen by PT this AM for treatment. Pt's eyes closed and req sternal rub to open w/ pt's eyes remaining open throughout session. No verbal responses by pt to questions. No response to directions or cues when given.  Pt performed all bed mobility req DEP A. Pt leaning posteriorly when seated at EOB and resistant to sitting upright after vc and tc given. PT will s/o at this time w/ pt returning home upon dc w/ [facility]/palliative per CCP.    Patient was not wearing a face mask during this therapy encounter. Therapist used appropriate personal protective equipment including gown, eye protection, mask and gloves.  Mask used was N95/duckbill. Appropriate PPE was worn during the entire therapy session. Hand hygiene was completed before and after therapy session. Patient is in enhanced droplet precautions.

## 2022-12-14 NOTE — CONSULTS
[facility] Visit Report    Mary Alice Sanders  [id number]  12/14/2022    [facility] consult received and records reviewed with [facility] medical officer. Patient is medically eligible for services at discharge. Met with patient's son Mann Swift at bedside. Detailed explanation of services provided for routine home services. Wishes are for patient to return home with [facility] services; no rehab services desired and in favor of comfort measures only. DME are requested and ordered on behalf of patient, which [facility] will provide prior to discharge. Discharged is planned via ambulance after 12pm tomorrow 12/15/22. Please call with any updates or change in care needs.    Thank you for allowing [facility] to participate with this patient's and family care needs. Please call with any questions or updates.    Nimco Vásquez RN   [facility] Admissions Coordinator  [phone]

## 2022-12-14 NOTE — PROGRESS NOTES
"Nutrition Services    Patient Name:  Mary Alice Sanders  YOB: 1935  MRN: [id number]  Admit Date:  11/30/2022    PROGRESS NOTE - CLINICAL NUTRITION    Comments: Follow up for TF     Encounter Information         Reason for Encounter TF f/u     Current Issues Plans for patient to go home with hospice. TFs at goal rate and tolerating. Patient still does not respond to stimuli and remains restraints. Na is 134 today. Getting IVFs at 50ml/hr. Patient's son wants to leave [facility] in until discharge home. No plans for long term tube feeding. Will continue to follow       Current Nutrition Orders & Evaluation of Intake       Oral Nutrition     Current PO Diet NPO Diet NPO Type: Strict NPO   Supplement n/a   PO Evaluation     % PO Intake     # of Days Evaluated     Factors Affecting Intake  altered mental status   --   Enteral Nutrition     Enteral Route NG    TF Delivery Method Continuous    Enteral Product Isosource HN    Modular     Propofol Rate/Kcal     TF Current Rate (mL) 55    TF Goal Rate (mL) 55    Current Water Flush (mL) 30ml q4 hours     Current TF Provision  1584 kcal, 72 gm protein, 1066 mL free water + 180 mL water flushes         Calories 100% needs met         Protein  100 % needs met         TF Fluid (mL) 1246 ml          IV Fluids     Avg Volume Delivered (mL)     % Goal Volume     TF Residual  no or minimal residual    TF Changes none    TF Tolerance tolerating     Anthropometrics          Height    Weight Height: 165.1 cm (65\")  Weight: 58.5 kg (128 lb 14.4 oz) (12/14/22 0555)    BMI kg/m2 Body mass index is 21.45 kg/m².    Weight trend Unknown d/t varying bed scale weights ..  Documented weights    12/01/22 0423 12/02/22 0515 12/03/22 0500 12/04/22 0515   Weight: 54.4 kg (119 lb 14.9 oz) 53.2 kg (117 lb 4.6 oz) 58.9 kg (129 lb 13.6 oz) 51.5 kg (113 lb 9.6 oz)    12/05/22 0700 12/06/22 0552 12/07/22 0506 12/07/22 0635   Weight: 56.4 kg (124 lb 6.4 oz) 59.1 kg (130 lb 3.2 oz) 56.5 kg (124 lb " 9.6 oz) 60 kg (132 lb 3.2 oz)    12/08/22 0535 12/09/22 0600 12/10/22 0628 12/11/22 0503   Weight: 60.5 kg (133 lb 4.8 oz) 60.3 kg (133 lb) 59.3 kg (130 lb 12.8 oz) 59.4 kg (131 lb)    12/12/22 0514 12/13/22 0449 12/14/22 0555   Weight: 59 kg (130 lb) 59.6 kg (131 lb 6.4 oz) 58.5 kg (128 lb 14.4 oz)          Labs        Pertinent Labs Reviewed, listed below     Results from last 7 days   Lab Units 12/14/22  0430 12/13/22  0422 12/12/22  0411   SODIUM mmol/L 134* 133* 136   POTASSIUM mmol/L 4.1 4.3 4.6   CHLORIDE mmol/L 101 99 104   CO2 mmol/L 23.6 24.0 23.9   BUN mg/dL 13 11 10   CREATININE mg/dL 0.34* 0.34* 0.32*   CALCIUM mg/dL 8.6 8.9 8.7   GLUCOSE mg/dL 109* 97 110*     Results from last 7 days   Lab Units 12/14/22  0430   PHOSPHORUS mg/dL 2.8   HEMOGLOBIN g/dL 10.4*   HEMATOCRIT % 30.5*   WBC 10*3/mm3 6.22   ALBUMIN g/dL 2.70*     Results from last 7 days   Lab Units 12/14/22  0430 12/13/22  0422 12/12/22  0411 12/11/22  0406 12/10/22  0416   PLATELETS 10*3/mm3 196 208 191 146 137*     COVID19   Date Value Ref Range Status   11/30/2022 Detected (C) Not Detected - Ref. Range Final     Lab Results   Component Value Date    HGBA1C 5.20 03/05/2020          Medications            Scheduled Medications insulin regular, 0-7 Units, Subcutaneous, Q6H  potassium phosphate, 15 mmol, Intravenous, Once  sodium chloride, 10 mL, Intravenous, Q12H        Infusions sodium chloride, 50 mL/hr, Last Rate: 50 mL/hr (12/12/22 1412)        PRN Medications •  acetaminophen **OR** acetaminophen  •  dextrose  •  dextrose  •  glucagon (human recombinant)  •  ipratropium-albuterol  •  ondansetron **OR** ondansetron  •  potassium chloride  •  potassium phosphate infusion greater than 15 mMoles **OR** potassium phosphate **OR** potassium phosphate  •  sodium chloride  •  sodium chloride     Physical Findings          Physical Appearance confused, disoriented, somnolent, Other: in restrains, nonverbal   Oral/Mouth Cavity teeth missing    Edema  no edema   Gastrointestinal last bowel movement:12/12   Skin  pressure injury DTI thoracic and sacral spine    Tubes/Drains NG tube   NFPE Date Completed: 12/1   --  NUTRITION INTERVENTION / PLAN OF CARE  Intervention Goal         Intervention Goal(s) Nutrition support treatment, Reduce/improve symptoms, Meet estimated needs, Disease management/therapy, Tolerate TF/PN at goal and Maintain weight     Nutrition Intervention         RD Action Follow Tx Progress and Care plan reviewed     Nutrition Prescription         Diet Prescription     Supplement Prescription n/a   EN/PN Prescription    New Prescription Ordered? Continue same per protocol   --   Enteral Prescription:     Enteral Route NG    TF Delivery Method Continuous    Enteral Product Isosource HN    Modular     Propofol Rate/Kcal     TF Start Rate (mL/hr) 10    TF Goal Rate (mL/hr) 55    Free Water Flush (mL) 30ml q4 hours     TF Provision at Goal: 1584 kcal, 72 gm protein, 1066 mL free water + 180 mL water flushes         Calories 100 % needs met         Protein  100 % needs met         Fluid (mL) 1246 mL total     Prescription Ordered Continue same per protocol     Monitor/Evaluation        Monitor Per protocol, Pertinent labs, EN delivery/tolerance, Weight, Skin status, GI status, Symptoms, POC/GOC, Swallow function   Discharge Needs Pending clinical course   Education Education not appropriate at this time   --    RD to follow up per protocol.    Electronically signed by:  Mariah Darnell RD  12/14/22 09:21 EST

## 2022-12-14 NOTE — PLAN OF CARE
Goal Outcome Evaluation:  Plan of Care Reviewed With: patient        Progress: no change   Restraints in place. Tube feeds running. [id number]. Purewick in place.

## 2022-12-14 NOTE — CASE MANAGEMENT/SOCIAL WORK
"Physicians Statement of Medical Necessity for  Ambulance Transportation    GENERAL INFORMATION     Name: Mary Alice Sanders  YOB: 1935  Medicare #: [id number]  Transport Date: 12/15/2022 1500 (Valid for round trips this date, or for scheduled repetitive trips for 60 days from the date signed below.)  Origin: [facility]  Destination: [address] [place] [address]  Is the Patient's stay covered under Medicare Part A (PPS/DRG?)Yes  Closest appropriate facility? Yes  If this a hosp-hosp transfer? No  Is this a hospice patient? Yes, Is this transport related to patient's terminal illness? Yes    MEDICAL NECESSITY QUESTIONAIRE    Ambulance Transportation is medically necessary only if other means of transportation are contraindicated or would be potentially harmful to the patient.  To meet this requirement, the patient must be either \"bed confined\" or suffer from a condition such that transport by means other than an ambulance is contraindicated by the patient's condition.  The following questions must be answered by the healthcare professional signing below for this form to be valid:     1) Describe the MEDICAL CONDITION (physical and/or mental) of this patient AT THE TIME OF AMBULANCE TRANSPORT that requires the patient to be transported in an ambulance, and why transport by other means is contraindicated by the patient's condition:    Problems Addressed this Visit        Other    * (Principal) Hypernatremia - Primary    Altered mental status    DUTCH (acute kidney injury) (HCC)   Other Visit Diagnoses     Lactic acid acidosis          Diagnoses       Codes Comments    Hypernatremia    -  Primary ICD-10-CM: E87.0  ICD-9-CM: 276.0     Altered mental status, unspecified altered mental status type     ICD-10-CM: R41.82  ICD-9-CM: 780.97     DUTCH (acute kidney injury) (HCC)     ICD-10-CM: N17.9  ICD-9-CM: 584.9     Lactic acid acidosis     ICD-10-CM: E87.20  ICD-9-CM: 276.2     " "    Patient Active Problem List   Diagnosis   • Blues   • Acid reflux   • Blood glucose elevated   • Hypercholesterolemia   • BP (high blood pressure)   • OP (osteoporosis)   • Allergic rhinitis   • Alzheimer's disease (HCC)   • Vitamin B 12 deficiency   • Cystitis   • Bronchitis   • Vitamin D toxicity   • Complicated UTI (urinary tract infection)   • Hypertension   • Decreased responsiveness   • DNR (do not resuscitate)   • Vasovagal episode   • Malnutrition (HCC)   • Age-related physical debility   • Hypernatremia   • Severe malnutrition (HCC)   • Altered mental status   • DUTCH (acute kidney injury) (HCC)       Past Medical History:   Diagnosis Date   • Allergic rhinitis    • Chest pain    • GERD (gastroesophageal reflux disease)    • Hx of migraine headaches    • Hx: UTI (urinary tract infection)    • Hypertension    • Varicose veins       Past Surgical History:   Procedure Laterality Date   •  SECTION        2) Is this patient \"bed confined\" as defined below?Yes   To be \"bed confined\" the patient must satisfy all three of the following criteria:  (1) unable to get up from bed without assistance; AND (2) unable to ambulate;  AND (3) unable to sit in a chair or wheelchair.  3) Can this patient safely be transported by car or wheelchair van (I.e., may safely sit during transport, without an attendant or monitoring?)No   4. In addition to completing questions 1-3 above, please check any of the following conditions that apply*:          *Note: supporting documentation for any boxes checked must be maintained in the patient's medical records Patient is confused and Other Falls risk, [facility] pt, home with family       SIGNATURE OF PHYSICIAN OR OTHER AUTHORIZED HEALTHCARE PROFESSIONAL    I certify that the above information is true and correct based on my evaluation of this patient, and represent that the patient requires transport by ambulance and that other forms of transport are contraindicated.  I understand " that this information will be used by the Centers for Medicare and Medicaid Services (CMS) to support the determiniation of medical necessity for ambulance services, and I represent that I have personal knowledge of the patient's condition at the time of transport.       If this box is checked, I also certify that the patient is physically or mentally incapable of signing the ambulance service's claim form and that the institution with which I am affiliated has furnished care, services or assistance to the patient.  My signature below is made on behalf of the patient pursuant to 42 .36(b)(4). In accordance with 42 .37, the specific reason(s) that the patient is physically or mentally incapable of signing the claim for is as follows:     Signature of Physician or Healthcare Professional     Ana Leslie RN/ALICJA  Date/Time:     12/15/2022 1500       (For Scheduled repetitive transport, this form is not valid for transports performed more than 60 days after this date).                                                                                                                                            --------------------------------------------------------------------------------------------  Printed Name and Credentials of Physician or Authorized Healthcare Professional     *Form must be signed by patient's attending physician for scheduled, repetitive transports,.  For non-repetitive ambulance transports, if unable to obtain the signature of the attending physician, any of the following may sign (please select below):     Physician  Clinical Nurse Specialist  Registered Nurse     Physician Assistant  Discharge Planner  Licensed Practical Nurse     Nurse Practitioner

## 2022-12-14 NOTE — THERAPY TREATMENT NOTE
Patient Name: Mary Alice Sanders  : 1935    MRN: [id number]                              Today's Date: 2022       Admit Date: 2022    Visit Dx:     ICD-10-CM ICD-9-CM   1. Hypernatremia  E87.0 276.0   2. Altered mental status, unspecified altered mental status type  R41.82 780.97   3. DUTCH (acute kidney injury) (HCC)  N17.9 584.9   4. Lactic acid acidosis  E87.20 276.2     Patient Active Problem List   Diagnosis   • Blues   • Acid reflux   • Blood glucose elevated   • Hypercholesterolemia   • BP (high blood pressure)   • OP (osteoporosis)   • Allergic rhinitis   • Alzheimer's disease (HCC)   • Vitamin B 12 deficiency   • Cystitis   • Bronchitis   • Vitamin D toxicity   • Complicated UTI (urinary tract infection)   • Hypertension   • Decreased responsiveness   • DNR (do not resuscitate)   • Vasovagal episode   • Malnutrition (HCC)   • Age-related physical debility   • Hypernatremia   • Severe malnutrition (HCC)   • Altered mental status   • DUTCH (acute kidney injury) (HCC)     Past Medical History:   Diagnosis Date   • Allergic rhinitis    • Chest pain    • GERD (gastroesophageal reflux disease)    • Hx of migraine headaches    • Hx: UTI (urinary tract infection)    • Hypertension    • Varicose veins      Past Surgical History:   Procedure Laterality Date   •  SECTION        General Information     Row Name 22 1128          Physical Therapy Time and Intention    Document Type therapy note (daily note)  -PH     Mode of Treatment physical therapy  -PH     Row Name 22 1128          General Information    Existing Precautions/Restrictions fall  -PH     Barriers to Rehab previous functional deficit;cognitive status;medically complex  -PH     Row Name 22 1128          Safety Issues, Functional Mobility    Safety Issues Affecting Function (Mobility) ability to follow commands;insight into deficits/self-awareness;safety precaution awareness;problem-solving  -PH     Impairments Affecting  Function (Mobility) cognition;balance;endurance/activity tolerance;strength  -PH     Comment, Safety Issues/Impairments (Mobility) non skid socks donned  -PH           User Key  (r) = Recorded By, (t) = Taken By, (c) = Cosigned By    Initials Name Provider Type    Mishel Pryor PTA Physical Therapist Assistant               Mobility     Row Name 12/14/22 1129          Bed Mobility    Bed Mobility bed mobility (all) activities  -PH     All Activities, [place] (Bed Mobility) dependent (less than 25% patient effort);verbal cues;nonverbal cues (demo/gesture)  -PH     Comment, (Bed Mobility) pt opened eyes after sternal rub and was assisted to EOB; pt resisting full upright sitting while EOB and returned to bed after approx 2 min  -PH     Row Name 12/14/22 1129          Bed-Chair Transfer    Bed-Chair [place] (Transfers) unable to assess  -PH     Row Name 12/14/22 [facility]9          Sit-Stand Transfer    Sit-Stand [place] (Transfers) unable to assess  -     Row Name 12/14/22 [facility]9          Gait/Stairs (Locomotion)    [place] Level (Stairs) unable to assess  -           User Key  (r) = Recorded By, (t) = Taken By, (c) = Cosigned By    Initials Name Provider Type     Mishel Rutledge PTA Physical Therapist Assistant               Obj/Interventions     Row Name 12/14/22 1129          Motor Skills    Therapeutic Exercise other (see comments)  resisting ther ex when attempted  -     Row Name 12/14/22 1129          Balance    Balance Assessment sitting static balance  -PH     Static Sitting Balance dependent;verbal cues;non-verbal cues (demo/gesture)  -     Comment, Balance posterior lean w/ pt cued for posteral correction although pt resistant to full upright posture  -           User Key  (r) = Recorded By, (t) = Taken By, (c) = Cosigned By    Initials Name Provider Type    Mishel Pryor PTA Physical Therapist Assistant               Goals/Plan    No  documentation.                Clinical Impression     Row Name 12/14/22 1130          Pain    Pretreatment Pain Rating 0/10 - no pain  -PH     Posttreatment Pain Rating 0/10 - no pain  -PH     Row Name 12/14/22 1130          Plan of Care Review    Plan of Care Reviewed With patient  -PH     Progress no change  -PH     Outcome Evaluation Pt seen by PT this AM for treatment. Pt's eyes closed and req sternal rub to open w/ pt's eyes remaining open throughout session. No verbal responses by pt to questions. No response to directions or cues when given.  Pt performed all bed mobility req DEP A. Pt leaning posteriorly when seated at EOB and resistant to sitting upright after vc and tc given. PT will s/o at this time w/ pt returning home upon dc w/ [facility]/palliative per CCP.  -PH     Row Name 12/14/22 1130          Vital Signs    O2 Delivery Intra Treatment room air  -PH     O2 Delivery Post Treatment room air  -PH     Row Name 12/14/22 1130          Positioning and Restraints    Pre-Treatment Position in bed  -PH     Post Treatment Position bed  -PH     In Bed fowlers;call light within reach;encouraged to call for assist;exit alarm on  -PH     Restraints reapplied:;soft limb  -PH           User Key  (r) = Recorded By, (t) = Taken By, (c) = Cosigned By    Initials Name Provider Type    PH Mishel Rutledge PTA Physical Therapist Assistant               Outcome Measures     Row Name 12/14/22 8443          How much help from another person do you currently need...    Turning from your back to your side while in flat bed without using bedrails? 1  -PH     Moving from lying on back to sitting on the side of a flat bed without bedrails? 1  -PH     Moving to and from a bed to a chair (including a wheelchair)? 1  -PH     Standing up from a chair using your arms (e.g., wheelchair, bedside chair)? 1  -PH     Climbing 3-5 steps with a railing? 1  -PH     To walk in hospital room? 1  -PH     AM-PAC 6 Clicks Score (PT) 6  -PH      Highest level of mobility 2 --> Bed activities/dependent transfer  -     Row Name 12/14/22 1134          Functional Assessment    Outcome Measure Options AM-PAC 6 Clicks Basic Mobility (PT)  -           User Key  (r) = Recorded By, (t) = Taken By, (c) = Cosigned By    Initials Name Provider Type     Mishel Rutledge PTA Physical Therapist Assistant                             Physical Therapy Education     Title: PT OT SLP Therapies (In Progress)     Topic: Physical Therapy (In Progress)     Point: Mobility training (In Progress)     Learning Progress Summary           Patient Acceptance, E,D, NL by  at 12/14/2022 1134    Nonacceptance, E, NR by  at 12/12/2022 1526    Acceptance, E,D, NL,NR by  at 12/7/2022 1545    Acceptance, E,TB,D, VU,NR by  at 12/5/2022 1532                   Point: Home exercise program (In Progress)     Learning Progress Summary           Patient Acceptance, E,D, NL,NR by  at 12/7/2022 1545    Acceptance, E,TB,D, VU,NR by  at 12/5/2022 1532                   Point: Body mechanics (In Progress)     Learning Progress Summary           Patient Acceptance, E,D, NL by  at 12/14/2022 1134    Acceptance, E,D, NL,NR by  at 12/7/2022 1545    Acceptance, E,TB,D, VU,NR by  at 12/5/2022 1532                   Point: Precautions (In Progress)     Learning Progress Summary           Patient Acceptance, E,D, NL by  at 12/14/2022 1134    Acceptance, E,TB,D, VU,NR by  at 12/5/2022 1532                               User Key     Initials Effective Dates Name Provider Type Discipline     06/16/21 -  Cheyenne Devries, PT Physical Therapist PT    EB 06/16/21 -  Rhonda Scott PTA Physical Therapist Assistant PT     06/16/21 -  Mishel Rutledge PTA Physical Therapist Assistant PT     11/16/21 -  Orin Spain PT Physical Therapist PT              PT Recommendation and Plan     Plan of Care Reviewed With: patient  Progress: no change  Outcome Evaluation: Pt  seen by PT this AM for treatment. Pt's eyes closed and req sternal rub to open w/ pt's eyes remaining open throughout session. No verbal responses by pt to questions. No response to directions or cues when given.  Pt performed all bed mobility req DEP A. Pt leaning posteriorly when seated at EOB and resistant to sitting upright after vc and tc given. PT will s/o at this time w/ pt returning home upon dc w/ [facility]/p[facility]iative per CCP.     Time Calculation:    PT Charges     Row Name 12/14/22 1135             Time Calculation    Start Time 1008  -PH      Stop Time 1018  -PH      Time Calculation (min) 10 min  -PH      PT Received On 12/14/22  -PH      PT - Next Appointment 12/16/22  -PH         Timed Charges    17366 - PT Therapeutic Activity Minutes 10  -PH         Total Minutes    Timed Charges Total Minutes 10  -PH       Total Minutes 10  -PH            User Key  (r) = Recorded By, (t) = Taken By, (c) = Cosigned By    Initials Name Provider Type    Mishel Pryor PTA Physical Therapist Assistant              Therapy Charges for Today     Code Description Service Date Service Provider Modifiers Qty    [id number]  PT THERAPEUTIC ACT EA 15 MIN 12/14/2022 Mishel Rutledge PTA GP 1          PT G-Codes  Outcome Measure Options: AM-PAC 6 Clicks Basic Mobility (PT)  AM-PAC 6 Clicks Score (PT): 6  AM-PAC 6 Clicks Score (OT): 6  PT Discharge Summary  Anticipated Discharge Disposition (PT): home with 24/7 care, other (see comments) (home w/ [facility] and palliative per CCP)    Mishel Rutledge PTA  12/14/2022

## 2022-12-14 NOTE — PLAN OF CARE
Goal Outcome Evaluation:      Restraints continued to prevent pulling at lines, tubes, drains. Patient has a coretrack in right nare for continuous tube feedings.

## 2022-12-14 NOTE — PROGRESS NOTES
[facility]     LOS: 14 days     Subjective:    CC:Weakness - Generalized and Altered Mental Status    DIET:  Diet Order   Procedures   • NPO Diet NPO Type: Strict NPO     Patient is confused and not talking    Objective:    Vital Signs:  Temp:  [97.8 °F (36.6 °C)-98.6 °F (37 °C)] 97.8 °F (36.6 °C)  Heart Rate:  [88-94] 92  Resp:  [18-20] 18  BP: ()/(62-84) 106/62    SpO2:  [94 %-97 %] 94 %  on   ;   Device (Oxygen Therapy): room air  Body mass index is 21.45 kg/m².    Physical Exam  Constitutional:       Comments: Frail appearing   HENT:      Head: Normocephalic and atraumatic.   Cardiovascular:      Rate and Rhythm: Normal rate and regular rhythm.      Heart sounds: No murmur heard.    No friction rub.   Pulmonary:      Effort: Pulmonary effort is normal.      Breath sounds: Normal breath sounds.   Abdominal:      General: Bowel sounds are normal. There is no distension.      Palpations: Abdomen is soft.      Tenderness: There is no abdominal tenderness.   Skin:     General: Skin is warm and dry.   Neurological:      Mental Status: She is alert.      Comments: Smiles, won't talk to me   Psychiatric:         Mood and Affect: Mood normal.         Behavior: Behavior normal.         Results Review:    Glucose   Date Value Ref Range Status   12/14/2022 109 (H) 65 - 99 mg/dL Final   12/13/2022 97 65 - 99 mg/dL Final   12/12/2022 110 (H) 65 - 99 mg/dL Final     Results from last 7 days   Lab Units 12/14/22  0430   WBC 10*3/mm3 6.22   HEMOGLOBIN g/dL 10.4*   HEMATOCRIT % 30.5*   PLATELETS 10*3/mm3 196     Results from last 7 days   Lab Units 12/14/22  0430   SODIUM mmol/L 134*   POTASSIUM mmol/L 4.1   CHLORIDE mmol/L 101   CO2 mmol/L 23.6   BUN mg/dL 13   CREATININE mg/dL 0.34*   CALCIUM mg/dL 8.6   GLUCOSE mg/dL 109*                 Cultures:  No results found for: BLOODCX, URINECX, WOUNDCX, MRSACX, RESPCX, STOOLCX    I have reviewed daily medications and changes in CPOE    Scheduled  meds  insulin regular, 0-7 Units, Subcutaneous, Q6H  potassium phosphate, 15 mmol, Intravenous, Once  sodium chloride, 10 mL, Intravenous, Q12H        sodium chloride, 50 mL/hr, Last Rate: 50 mL/hr (12/14/22 1121)      PRN meds  •  acetaminophen **OR** acetaminophen  •  dextrose  •  dextrose  •  glucagon (human recombinant)  •  ipratropium-albuterol  •  ondansetron **OR** ondansetron  •  potassium chloride  •  potassium phosphate infusion greater than 15 mMoles **OR** potassium phosphate **OR** potassium phosphate  •  sodium chloride  •  sodium chloride        Hypernatremia    Hypercholesterolemia    BP (high blood pressure)    OP (osteoporosis)    Alzheimer's disease (HCC)    DNR (do not resuscitate)    Malnutrition (HCC)    Age-related physical debility    Severe malnutrition (HCC)    Altered mental status    DUTCH (acute kidney injury) ([facility])        Assessment/Plan:    Patient was admitted to the hospital by the intensivist and we were consulted to take over care for hypernatremia, malnutrition.      Initially admitted to ICU with sodium of 180..... Now back on telemetry.  Severely dehydrated but now responding to IV fluids.  Very elderly and frail with very advanced dementia.  Malnourished.  Her son does not want a permanent G-tube but would like a trial of NG feedings to see if she perks up.     Severe hypernatremia >180 on admission  -NS at 50 cc/hr  -Patient is getting Osmolite tube feeds, increasing to a goal of 55    Acute kidney injury-creatinine is normalized    Lactic acidosis  -Has resolved    COVID-19 infection  -Patient has been in the hospital now for 13 days no further treatment indicated, the patient remains on room air    Encephalopathy    D/w nurse, hospice     Plan for d/c with hospice tomorrow      Benson Adams MD  12/14/22  16:06 EST

## 2022-12-15 VITALS
WEIGHT: 125.6 LBS | RESPIRATION RATE: 16 BRPM | HEART RATE: 92 BPM | HEIGHT: 65 IN | BODY MASS INDEX: 20.93 KG/M2 | OXYGEN SATURATION: 96 % | TEMPERATURE: 98.5 F | DIASTOLIC BLOOD PRESSURE: 87 MMHG | SYSTOLIC BLOOD PRESSURE: 138 MMHG

## 2022-12-15 PROBLEM — D89.831 CYTOKINE RELEASE SYNDROME, GRADE 1: Status: ACTIVE | Noted: 2022-12-15

## 2022-12-15 LAB
ALBUMIN SERPL-MCNC: 2.9 G/DL (ref 3.5–5.2)
ANION GAP SERPL CALCULATED.3IONS-SCNC: 5 MMOL/L (ref 5–15)
BASOPHILS # BLD AUTO: 0.03 10*3/MM3 (ref 0–0.2)
BASOPHILS NFR BLD AUTO: 0.6 % (ref 0–1.5)
BUN SERPL-MCNC: 13 MG/DL (ref 8–23)
BUN/CREAT SERPL: 37.1 (ref 7–25)
CALCIUM SPEC-SCNC: 8.3 MG/DL (ref 8.6–10.5)
CHLORIDE SERPL-SCNC: 100 MMOL/L (ref 98–107)
CO2 SERPL-SCNC: 26 MMOL/L (ref 22–29)
CREAT SERPL-MCNC: 0.35 MG/DL (ref 0.57–1)
DEPRECATED RDW RBC AUTO: 48.1 FL (ref 37–54)
EGFRCR SERPLBLD CKD-EPI 2021: 99.1 ML/MIN/1.73
EOSINOPHIL # BLD AUTO: 0.1 10*3/MM3 (ref 0–0.4)
EOSINOPHIL NFR BLD AUTO: 2 % (ref 0.3–6.2)
ERYTHROCYTE [DISTWIDTH] IN BLOOD BY AUTOMATED COUNT: 14.3 % (ref 12.3–15.4)
GLUCOSE BLDC GLUCOMTR-MCNC: 115 MG/DL (ref 70–130)
GLUCOSE BLDC GLUCOMTR-MCNC: 143 MG/DL (ref 70–130)
GLUCOSE SERPL-MCNC: 105 MG/DL (ref 65–99)
HCT VFR BLD AUTO: 30.6 % (ref 34–46.6)
HGB BLD-MCNC: 10.4 G/DL (ref 12–15.9)
IMM GRANULOCYTES # BLD AUTO: 0.05 10*3/MM3 (ref 0–0.05)
IMM GRANULOCYTES NFR BLD AUTO: 1 % (ref 0–0.5)
LYMPHOCYTES # BLD AUTO: 1.35 10*3/MM3 (ref 0.7–3.1)
LYMPHOCYTES NFR BLD AUTO: 26.9 % (ref 19.6–45.3)
MCH RBC QN AUTO: 31.8 PG (ref 26.6–33)
MCHC RBC AUTO-ENTMCNC: 34 G/DL (ref 31.5–35.7)
MCV RBC AUTO: 93.6 FL (ref 79–97)
MONOCYTES # BLD AUTO: 0.59 10*3/MM3 (ref 0.1–0.9)
MONOCYTES NFR BLD AUTO: 11.8 % (ref 5–12)
NEUTROPHILS NFR BLD AUTO: 2.89 10*3/MM3 (ref 1.7–7)
NEUTROPHILS NFR BLD AUTO: 57.7 % (ref 42.7–76)
NRBC BLD AUTO-RTO: 0 /100 WBC (ref 0–0.2)
PHOSPHATE SERPL-MCNC: 2.9 MG/DL (ref 2.5–4.5)
PLATELET # BLD AUTO: 202 10*3/MM3 (ref 140–450)
PMV BLD AUTO: 10.7 FL (ref 6–12)
POTASSIUM SERPL-SCNC: 4 MMOL/L (ref 3.5–5.2)
RBC # BLD AUTO: 3.27 10*6/MM3 (ref 3.77–5.28)
SODIUM SERPL-SCNC: 131 MMOL/L (ref 136–145)
WBC NRBC COR # BLD: 5.01 10*3/MM3 (ref 3.4–10.8)

## 2022-12-15 PROCEDURE — 80069 RENAL FUNCTION PANEL: CPT | Performed by: INTERNAL MEDICINE

## 2022-12-15 PROCEDURE — 85025 COMPLETE CBC W/AUTO DIFF WBC: CPT | Performed by: INTERNAL MEDICINE

## 2022-12-15 PROCEDURE — 82962 GLUCOSE BLOOD TEST: CPT

## 2022-12-15 NOTE — DISCHARGE SUMMARY
[facility]  [phone]    DISCHARGE SUMMARY  [facility]    Patient Identification:  Name: Mary Alice Sanders  Age: 87 y.o.  Sex: female  :  1935  MRN: [id number]  Primary Care Physician: Gera Barker MD    Admit date: 2022  Discharge date and time:      Discharge Diagnoses:  Hypernatremia    Hypercholesterolemia    BP (high blood pressure)    OP (osteoporosis)    Alzheimer's disease (HCC)    DNR (do not resuscitate)    Malnutrition (HCC)    Age-related physical debility    Severe malnutrition (HCC)    Altered mental status    DUTCH (acute kidney injury) (HCC)    Cytokine release syndrome, grade 1       History of present illness from H&P:     Mrs. Sanders is a 86yo female brought to the ER by EMS for confusion.  Patient lives withson at home and has been confused for several days, no further history is available. Evaluation in the ER shows na greater than 180. Her COVID is positive. procal is low. U/a is bland.  hgb 17. Suspect she got covid and then got really dehydrated.      Her son is at bedside, he says he was diagnosed with a UTI about two weeks treated for that and started getting better then got worse again and hasn't been eating much. Says she has dementia at baseline and often doesn't want to eat a great deal, says she doesn't talk much at baseline    Hospital Course:     Patient was admitted to the hospital by the intensivist and we were consulted to take over care for hypernatremia, malnutrition.       Initially admitted to ICU with sodium of 180. He was then transferred to telemetry.  He is severely dehydrated but responded.  She is elderly and frail with very advanced dementia with malnourished.  Her son does not want a permanent G-tube but would like a trial of NG feedings to see if she perks up.  She is still requiring restraints.  Patient is nonverbal for me.    The plan is for the patient to have her NG tube removed today and the  patient will discharge with hospice.     Severe hypernatremia >180 on admission  -Patient is getting Osmolite tube feeds, increasing to a goal of 55 and stop on discharge     Acute kidney injury-creatinine is normalized     Lactic acidosis  -Has resolved     COVID-19 infection  -Patient has been in the hospital now for 15 days no further treatment indicated, the patient remains on room air     Encephalopathy     Home with hospice.  Comfort medications only through hospice    The patient was seen and examined on the day of discharge.    Consults:   Consults     Date and Time Order Name Status Description    12/3/2022 10:41 AM Hematology & Oncology Inpatient Consult Completed     12/2/2022 11:34 PM Inpatient Internal Medicine Consult      11/30/2022  2:07 PM Inpatient Nephrology Consult      11/30/2022  1:40 PM Pulmonology (on-call MD unless specified)            Results from last 7 days   Lab Units 12/15/22  0419   WBC 10*3/mm3 5.01   HEMOGLOBIN g/dL 10.4*   HEMATOCRIT % 30.6*   PLATELETS 10*3/mm3 202       Results from last 7 days   Lab Units 12/15/22  0419   SODIUM mmol/L 131*   POTASSIUM mmol/L 4.0   CHLORIDE mmol/L 100   CO2 mmol/L 26.0   BUN mg/dL 13   CREATININE mg/dL 0.35*   GLUCOSE mg/dL 105*   CALCIUM mg/dL 8.3*       Significant Diagnostic Studies:   WBC   Date Value Ref Range Status   12/15/2022 5.01 3.40 - 10.80 10*3/mm3 Final     Hemoglobin   Date Value Ref Range Status   12/15/2022 10.4 (L) 12.0 - 15.9 g/dL Final     Hematocrit   Date Value Ref Range Status   12/15/2022 30.6 (L) 34.0 - 46.6 % Final     Platelets   Date Value Ref Range Status   12/15/2022 202 140 - 450 10*3/mm3 Final     Sodium   Date Value Ref Range Status   12/15/2022 131 (L) 136 - 145 mmol/L Final     Potassium   Date Value Ref Range Status   12/15/2022 4.0 3.5 - 5.2 mmol/L Final     Chloride   Date Value Ref Range Status   12/15/2022 100 98 - 107 mmol/L Final     CO2   Date Value Ref Range Status   12/15/2022 26.0 22.0 - 29.0 mmol/L  Final     BUN   Date Value Ref Range Status   12/15/2022 13 8 - 23 mg/dL Final     Creatinine   Date Value Ref Range Status   12/15/2022 0.35 (L) 0.57 - 1.00 mg/dL Final     Glucose   Date Value Ref Range Status   12/15/2022 105 (H) 65 - 99 mg/dL Final     Calcium   Date Value Ref Range Status   12/15/2022 8.3 (L) 8.6 - 10.5 mg/dL Final     Phosphorus   Date Value Ref Range Status   12/15/2022 2.9 2.5 - 4.5 mg/dL Final     No results found for: AST, ALT, ALKPHOS  No results found for: APTT, INR  No results found for: COLORU, CLARITYU, SPECGRAV, PHUR, PROTEINUR, GLUCOSEU, KETONESU, BLOODU, NITRITE, LEUKOCYTESUR, BILIRUBINUR, UROBILINOGEN, RBCUA, WBCUA, BACTERIA, UACOMMENT  No results found for: TROPONINT, TROPONINI, BNP  No components found for: HGBA1C;2  No components found for: TSH;2    Imaging Results (All)     Procedure Component Value Units Date/Time    XR Abdomen KUB [[id number]] Collected: 12/06/22 1245     Updated: 12/06/22 1250    Narrative:      PROCEDURE:  XR ABDOMEN KUB-     HISTORY: Enteric tube placement.     COMPARISON: KUB 12/3/2022     FINDINGS:       2 views of the abdomen/pelvis were obtained. There is a new enteric tube  with the tip projecting over the expected location of the antroduodenal  junction, replaced from the prior radiographs. Prominent and mildly  dilated air-filled loops of small bowel throughout the abdomen have  progressed and can be seen with ileus or obstruction. These findings  would be better assessed with CT, if clinically indicated. There is at  least moderate rectal stool burden. There is lumbar dextroscoliosis.  There is multilevel degenerative disc disease.     This report was finalized on 12/6/2022 12:47 PM by Dr. Kelsy Duckworth M.D.       XR Abdomen KUB [[id number]] Collected: 12/03/22 1525     Updated: 12/03/22 1536    Narrative:      XR ABDOMEN KUB-     INDICATIONS: NG tube placement     TECHNIQUE: Frontal view of the abdomen     COMPARISON: 12/02/2022     FINDINGS:      In correlation with the CT from 10/20/2020, the lower aspect of the NG  tube extends further to the left of the spine than expected, potentially  representing airway location of the tube, or the patient may have  developed a hiatal hernia since the prior CT exam. The distal aspect of  the tube projects at the epigastric region, but then loops back.  Repositioning of the tube is advised. The bowel gas pattern is  nonspecific.       Impression:         As described.     Discussed by telephone with patient's nurse, [place]os[place], at time of  interpretation, 1530, 12/03/2022.        This report was finalized on 12/3/2022 3:33 PM by Dr. Darrick Morales M.D.       XR Chest 1 View [[id number]] Collected: 12/03/22 1256     Updated: 12/03/22 1303    Narrative:      XR CHEST 1 VW-     HISTORY: Female who is 87 years-old,  dyspnea     TECHNIQUE: Frontal view of the chest     COMPARISON: 11/30/2022     FINDINGS: The heart size is normal. Aorta is tortuous. Pulmonary  vasculature is unremarkable. No focal pulmonary consolidation, pleural  effusion, or pneumothorax. No acute osseous process.       Impression:      No focal pulmonary consolidation. Tortuous aorta. Follow-up  as clinically indicated.     This report was finalized on 12/3/2022 1:00 PM by Dr. Darrick Morales M.D.       XR Abdomen KUB [[id number]] Collected: 12/03/22 0310     Updated: 12/03/22 0310    Narrative:        Patient: NOHEMY COOK  Time Out: 03:09  Exam(s): FILM ABDOMEN     EXAM:    XR Abdomen, 2 Views    CLINICAL HISTORY:     Reason for exam: Cortrak placement.    TECHNIQUE:    Frontal view of the abdomen pelvis with upright view of the abdomen.    COMPARISON:    No relevant prior studies available.    FINDINGS:    Intraperitoneal space:  No free air.    Gastrointestinal tract:  Unremarkable.  No dilation.    Bones joints:  Unremarkable.    Tubes, lines and devices:  Weighted tip enteric feeding tube is kinked   in the distal stomach, with the tip  retroflexed towards the gastric   fundus.    IMPRESSION:         Weighted tip enteric feeding tube is kinked in the distal stomach, with   the tip retroflexed towards the gastric fundus.  This will require   repositioning prior to use.      Impression:          Electronically signed by Gianfranco Campuzano MD on 12-03-22 at 0309    XR Abdomen KUB [[id number]] Collected: 12/02/22 1333     Updated: 12/02/22 1338    Narrative:      XR ABDOMEN KUB-     INDICATIONS: NG tube placement     TECHNIQUE: Frontal view of the abdomen     COMPARISON:  image from CT from 10/20/2020     FINDINGS:      NG tube appears to extend just beyond the gastroesophageal junction,  recommend advancing the tube. The bowel gas pattern is nonobstructive.  Follow-up as clinically indicated.          Impression:         As described.     This report was finalized on 12/2/2022 1:35 PM by Dr. Darrick Morales M.D.       CT Head Without Contrast [[id number]] Collected: 11/30/22 1313     Updated: 11/30/22 1357    Narrative:      CT HEAD WITHOUT CONTRAST     CLINICAL HISTORY: Confusion.     TECHNIQUE: CT scan of the head was obtained with 3 mm axial soft tissue  algorithm images. No intravenous contrast was administered. Sagittal and  coronal reconstructions were obtained.     COMPARISON: CT head dated 10/08/2021.     FINDINGS:       The ventricles, sulci, and cisterns are remarkable for diffuse  parenchymal atrophic changes. There is moderate chronic small vessel  ischemic phenomena. The basal ganglia and thalami are unremarkable in  appearance. The posterior fossa structures are within normal limits.  Atherosclerotic calcifications are incidentally appreciated within the  intracranial vasculature. When compared to the prior study dated  10/08/2021, there is no significant interval change.     Incidental note is made of complete opacification of the external  auditory canals bilaterally. This is presumably secondary to cerumen  impaction although  correlation with visual inspection is suggested.       Impression:         No significant interval change is seen since the prior study dated  10/08/2021. Again noted are moderate changes of chronic small vessel  ischemic phenomena and diffuse parenchymal atrophic change. The etiology  of the patient's acute confusion is not further elucidated on this  examination; and if further assessment is warranted, one could obtain an  MRI of the brain for follow-up.     There is complete opacification of the external auditory canals  bilaterally that is presumably secondary to cerumen impaction although  correlation with visual inspection is suggested.     Radiation dose reduction techniques were utilized, including automated  exposure control and exposure modulation based on body size.     This report was finalized on 11/30/2022 1:54 PM by Dr. Lul Banks M.D.       XR Chest 1 View [[id number]] Collected: 11/30/22 1217     Updated: 11/30/22 1221    Narrative:      XR CHEST 1 VW-     HISTORY: Female who is 87 years-old,  altered mental status     TECHNIQUE: Frontal views of the chest     COMPARISON: 10/07/2021     FINDINGS: The heart size is normal. Aorta is tortuous. Pulmonary  vasculature is unremarkable. No focal pulmonary consolidation, pleural  effusion, or pneumothorax. No acute osseous process.       Impression:      No evidence for acute pulmonary process. Tortuous aorta.  Follow-up as clinically indicated.     This report was finalized on 11/30/2022 12:18 PM by Dr. Darrick Morales M.D.         No results found for: SITE, ALLENTEST, PHART, UFT9JHA, PO2ART, FDE9TPA, BASEEXCESS, J9TJMOPJ, HGBBG, HCTABG, OXYHEMOGLOBI, METHHGBN, CARBOXYHGB, CO2CT, BAROMETRIC, MODALITY, FIO2       Discharge Medications      Patient Not Prescribed Medications Upon Discharge           Patient Instructions:       Future Appointments   Date Time Provider Department Center   12/15/2022  3:00 PM EMS MED [facility]         Follow-up  Information     Lucero, Gera BAXTER MD .    Specialty: Internal Medicine  Contact information:  [id number] [facility] [address] [phone]                         Discharge Order (From admission, onward)     Start     Ordered    12/15/22 1425  Discharge patient  Once        Expected Discharge Date: 12/15/22    Discharge Disposition: Hospice/Home    Physician of Record for Attribution - Please select from Treatment Team: BENSON ADAMS [[id number]]    Review needed by CMO to determine Physician of Record: No       Question Answer Comment   Physician of Record for Attribution - Please select from Treatment Team BENSON ADAMS    Review needed by CMO to determine Physician of Record No        12/15/22 1425                Diet Order   Procedures   • NPO Diet NPO Type: Strict NPO       Home with hospice    Total time spent discharging patient including evaluation, post hospitalization follow up,  medication and post hospitalization instructions and education, total time exceeds 30 minutes.    Signed:  Benson Adams MD  12/15/2022  14:25 EST

## 2022-12-15 NOTE — CASE MANAGEMENT/SOCIAL WORK
Case Management Discharge Note      Final Note: Home with [facility] via [facility] at 3pm, no additional CCP needs. Davion RN/CCP         Selected Continued Care - Admitted Since 11/30/2022     Destination    No services have been selected for the patient.              Durable Medical Equipment    No services have been selected for the patient.              Dialysis/Infusion    No services have been selected for the patient.              Home Medical Care    No services have been selected for the patient.              Therapy    No services have been selected for the patient.              Community Resources    No services have been selected for the patient.              Community & DME    No services have been selected for the patient.                       Final Discharge Disposition Code: 50 - home with hospice

## 2022-12-15 NOTE — CASE MANAGEMENT/SOCIAL WORK
Continued Stay Note  [facility]     Patient Name: Mary Alice Sanders  MRN: [id number]  Today's Date: 12/15/2022    Admit Date: 11/30/2022    Plan: Home with [facility] via [facility] at 3pm   Discharge Plan     Row Name 12/15/22 1441       Plan    Plan Home with [facility] at 3pm    Patient/Family in Agreement with Plan yes    Plan Comments DC plan is to return home with [facility]. EMS scheduled for 3pm today. Pts son Mann notified of transport time. He will be home at time of transport home. Davion RN/CCP    Final Discharge Disposition Code 50 - home with hospice    Final Note Home with [facility] via [facility] at 3pm, no additional CCP needs. Davion RN/CCP               Discharge Codes    No documentation.               Expected Discharge Date and Time     Expected Discharge Date Expected Discharge Time    Dec 15, 2022             Ana Leslie RN

## 2022-12-15 NOTE — PLAN OF CARE
Goal Outcome Evaluation:  Plan of Care Reviewed With: patient        Progress: no change  Outcome Evaluation: vss. cortrak in place, tube feeds. purewick in place. q2 turns. restraints in place. dc with hospice today.

## 2022-12-21 NOTE — PROGRESS NOTES
Enter Query Response Below      Query Response:     Bilateral thoracic spine deep tissue pressure injuries, present on admit         If applicable, please update the problem list.         Patient: Mary Alice Sanders        : 1935  Account: [id number]           Admit Date: 2022        How to Respond to this query:       a. Click New Note     b. Answer query within the yellow box.                c. Update the Problem List, if applicable.      If you have any questions about this query contact me at: [email or web address]    ,  Patient was seen by wound nurse for Deep tissue pressure injuries bilaterally on the thoracic spine are. Progress notes did not mention the pressure injuries. Wound nurse noted they were present on admit and treated with silicone border dressing.    Please clarify if the following were treated and monitored during this admission and present on admit.     =Bilateral thoracic spine deep tissue pressure injuries, present on admit  -other________________  -unable to determine     By submitting this query, we are merely seeking further clarification of documentation to accurately reflect all conditions that you are monitoring, evaluating, treating or that extend the hospitalization or utilize additional resources of care. Please utilize your independent clinical judgment when addressing the question(s) above.     This query and your response, once completed, will be entered into the legal medical record.    Sincerely,  Tammy Ford  Clinical Documentation Integrity Program